# Patient Record
Sex: MALE | Race: WHITE | Employment: FULL TIME | URBAN - METROPOLITAN AREA
[De-identification: names, ages, dates, MRNs, and addresses within clinical notes are randomized per-mention and may not be internally consistent; named-entity substitution may affect disease eponyms.]

---

## 2017-01-18 ENCOUNTER — OFFICE VISIT (OUTPATIENT)
Dept: ORTHOPEDIC SURGERY | Age: 37
End: 2017-01-18

## 2017-01-18 VITALS — BODY MASS INDEX: 23.49 KG/M2 | WEIGHT: 155 LBS | HEIGHT: 68 IN

## 2017-01-18 DIAGNOSIS — M79.645 FINGER PAIN, LEFT: Primary | ICD-10-CM

## 2017-01-18 DIAGNOSIS — S60.022A CONTUSION OF LEFT INDEX FINGER WITHOUT DAMAGE TO NAIL, INITIAL ENCOUNTER: ICD-10-CM

## 2017-01-18 PROCEDURE — 73140 X-RAY EXAM OF FINGER(S): CPT | Performed by: ORTHOPAEDIC SURGERY

## 2017-01-18 PROCEDURE — 99213 OFFICE O/P EST LOW 20 MIN: CPT | Performed by: ORTHOPAEDIC SURGERY

## 2017-03-13 ENCOUNTER — OFFICE VISIT (OUTPATIENT)
Dept: FAMILY MEDICINE CLINIC | Age: 37
End: 2017-03-13

## 2017-03-13 VITALS
SYSTOLIC BLOOD PRESSURE: 106 MMHG | OXYGEN SATURATION: 98 % | BODY MASS INDEX: 22.5 KG/M2 | DIASTOLIC BLOOD PRESSURE: 70 MMHG | HEART RATE: 86 BPM | RESPIRATION RATE: 18 BRPM | WEIGHT: 148 LBS

## 2017-03-13 DIAGNOSIS — F31.78 BIPOLAR 1 DISORDER, MIXED, FULL REMISSION (HCC): ICD-10-CM

## 2017-03-13 DIAGNOSIS — F40.10 SOCIAL ANXIETY DISORDER: ICD-10-CM

## 2017-03-13 DIAGNOSIS — F51.04 PSYCHOPHYSIOLOGICAL INSOMNIA: ICD-10-CM

## 2017-03-13 DIAGNOSIS — I10 ESSENTIAL HYPERTENSION: Chronic | ICD-10-CM

## 2017-03-13 DIAGNOSIS — G40.209 PARTIAL SYMPTOMATIC EPILEPSY WITH COMPLEX PARTIAL SEIZURES, NOT INTRACTABLE, WITHOUT STATUS EPILEPTICUS (HCC): ICD-10-CM

## 2017-03-13 DIAGNOSIS — K21.9 GASTROESOPHAGEAL REFLUX DISEASE, ESOPHAGITIS PRESENCE NOT SPECIFIED: ICD-10-CM

## 2017-03-13 DIAGNOSIS — F32.5 MAJOR DEPRESSION IN COMPLETE REMISSION (HCC): ICD-10-CM

## 2017-03-13 PROCEDURE — 99214 OFFICE O/P EST MOD 30 MIN: CPT | Performed by: FAMILY MEDICINE

## 2017-03-13 RX ORDER — GABAPENTIN 400 MG/1
CAPSULE ORAL
Qty: 120 CAPSULE | Refills: 2 | Status: SHIPPED | OUTPATIENT
Start: 2017-03-13 | End: 2017-06-08 | Stop reason: SDUPTHER

## 2017-03-13 RX ORDER — CLONAZEPAM 2 MG/1
2 TABLET ORAL 2 TIMES DAILY PRN
Qty: 60 TABLET | Refills: 0 | Status: SHIPPED | OUTPATIENT
Start: 2017-03-13 | End: 2018-07-23 | Stop reason: SDUPTHER

## 2017-03-13 RX ORDER — LAMOTRIGINE 200 MG/1
TABLET ORAL
Qty: 60 TABLET | Refills: 2 | Status: SHIPPED | OUTPATIENT
Start: 2017-03-13 | End: 2017-06-08 | Stop reason: SDUPTHER

## 2017-03-13 RX ORDER — TRAZODONE HYDROCHLORIDE 100 MG/1
50-100 TABLET ORAL NIGHTLY
Qty: 40 TABLET | Refills: 5 | Status: SHIPPED | OUTPATIENT
Start: 2017-03-13 | End: 2018-01-12 | Stop reason: SDUPTHER

## 2017-03-13 RX ORDER — RANITIDINE 300 MG/1
TABLET ORAL
Qty: 30 TABLET | Refills: 5 | Status: SHIPPED | OUTPATIENT
Start: 2017-03-13 | End: 2017-06-08 | Stop reason: SDUPTHER

## 2017-03-13 RX ORDER — AMLODIPINE BESYLATE 2.5 MG/1
TABLET ORAL
Qty: 30 TABLET | Refills: 2 | Status: SHIPPED | OUTPATIENT
Start: 2017-03-13 | End: 2017-06-08 | Stop reason: SDUPTHER

## 2017-03-13 RX ORDER — TRIHEXYPHENIDYL HYDROCHLORIDE 2 MG/1
2 TABLET ORAL 2 TIMES DAILY
Qty: 60 TABLET | Refills: 11 | Status: SHIPPED | OUTPATIENT
Start: 2017-03-13 | End: 2018-01-12 | Stop reason: SDUPTHER

## 2017-03-13 RX ORDER — ZIPRASIDONE HYDROCHLORIDE 60 MG/1
60 CAPSULE ORAL 2 TIMES DAILY WITH MEALS
Qty: 60 CAPSULE | Refills: 5 | Status: SHIPPED | OUTPATIENT
Start: 2017-03-13 | End: 2017-06-08 | Stop reason: SDUPTHER

## 2017-03-13 RX ORDER — NADOLOL 20 MG/1
TABLET ORAL
Qty: 45 TABLET | Refills: 5 | Status: SHIPPED | OUTPATIENT
Start: 2017-03-13 | End: 2017-06-08 | Stop reason: SDUPTHER

## 2017-03-13 RX ORDER — ZIPRASIDONE HYDROCHLORIDE 40 MG/1
40 CAPSULE ORAL DAILY
Qty: 30 CAPSULE | Refills: 2 | Status: SHIPPED | OUTPATIENT
Start: 2017-03-13 | End: 2017-06-08 | Stop reason: SDUPTHER

## 2017-03-13 RX ORDER — CLONAZEPAM 1 MG/1
1 TABLET ORAL 2 TIMES DAILY
Qty: 60 TABLET | Refills: 2 | Status: SHIPPED | OUTPATIENT
Start: 2017-03-13 | End: 2017-06-08 | Stop reason: SDUPTHER

## 2017-03-14 ENCOUNTER — TELEPHONE (OUTPATIENT)
Dept: FAMILY MEDICINE CLINIC | Age: 37
End: 2017-03-14

## 2017-05-15 ENCOUNTER — OFFICE VISIT (OUTPATIENT)
Dept: ORTHOPEDIC SURGERY | Age: 37
End: 2017-05-15

## 2017-05-15 VITALS — WEIGHT: 150 LBS | HEIGHT: 70 IN | BODY MASS INDEX: 21.47 KG/M2

## 2017-05-15 DIAGNOSIS — M25.531 WRIST PAIN, CHRONIC, RIGHT: Primary | ICD-10-CM

## 2017-05-15 DIAGNOSIS — M65.4 DE QUERVAIN'S TENOSYNOVITIS: ICD-10-CM

## 2017-05-15 DIAGNOSIS — G89.29 WRIST PAIN, CHRONIC, RIGHT: Primary | ICD-10-CM

## 2017-05-15 PROCEDURE — 20550 NJX 1 TENDON SHEATH/LIGAMENT: CPT | Performed by: ORTHOPAEDIC SURGERY

## 2017-05-15 PROCEDURE — 73110 X-RAY EXAM OF WRIST: CPT | Performed by: ORTHOPAEDIC SURGERY

## 2017-05-15 PROCEDURE — 99213 OFFICE O/P EST LOW 20 MIN: CPT | Performed by: ORTHOPAEDIC SURGERY

## 2017-06-05 ENCOUNTER — OFFICE VISIT (OUTPATIENT)
Dept: ORTHOPEDIC SURGERY | Age: 37
End: 2017-06-05

## 2017-06-05 VITALS — WEIGHT: 149.91 LBS | BODY MASS INDEX: 21.46 KG/M2 | HEIGHT: 70 IN

## 2017-06-05 DIAGNOSIS — M79.641 HAND PAIN, RIGHT: ICD-10-CM

## 2017-06-05 DIAGNOSIS — M19.049 HAND ARTHRITIS: ICD-10-CM

## 2017-06-05 DIAGNOSIS — M65.4 DE QUERVAIN'S TENOSYNOVITIS: Primary | ICD-10-CM

## 2017-06-05 PROCEDURE — 20550 NJX 1 TENDON SHEATH/LIGAMENT: CPT | Performed by: ORTHOPAEDIC SURGERY

## 2017-06-05 PROCEDURE — 20600 DRAIN/INJ JOINT/BURSA W/O US: CPT | Performed by: ORTHOPAEDIC SURGERY

## 2017-06-05 PROCEDURE — 99213 OFFICE O/P EST LOW 20 MIN: CPT | Performed by: ORTHOPAEDIC SURGERY

## 2017-06-08 ENCOUNTER — OFFICE VISIT (OUTPATIENT)
Dept: FAMILY MEDICINE CLINIC | Age: 37
End: 2017-06-08

## 2017-06-08 VITALS
HEART RATE: 70 BPM | SYSTOLIC BLOOD PRESSURE: 118 MMHG | DIASTOLIC BLOOD PRESSURE: 70 MMHG | OXYGEN SATURATION: 98 % | HEIGHT: 70 IN | WEIGHT: 149 LBS | BODY MASS INDEX: 21.33 KG/M2 | RESPIRATION RATE: 16 BRPM

## 2017-06-08 DIAGNOSIS — F31.78 BIPOLAR 1 DISORDER, MIXED, FULL REMISSION (HCC): ICD-10-CM

## 2017-06-08 DIAGNOSIS — K21.9 GASTROESOPHAGEAL REFLUX DISEASE, ESOPHAGITIS PRESENCE NOT SPECIFIED: ICD-10-CM

## 2017-06-08 DIAGNOSIS — R20.2 PARESTHESIAS: ICD-10-CM

## 2017-06-08 DIAGNOSIS — Z11.4 SCREENING FOR HIV WITHOUT PRESENCE OF RISK FACTORS: ICD-10-CM

## 2017-06-08 DIAGNOSIS — N52.2 DRUG-INDUCED ERECTILE DYSFUNCTION: ICD-10-CM

## 2017-06-08 DIAGNOSIS — F40.10 SOCIAL ANXIETY DISORDER: ICD-10-CM

## 2017-06-08 DIAGNOSIS — M19.041 PRIMARY OSTEOARTHRITIS OF BOTH HANDS: ICD-10-CM

## 2017-06-08 DIAGNOSIS — I10 ESSENTIAL HYPERTENSION: Primary | Chronic | ICD-10-CM

## 2017-06-08 DIAGNOSIS — M19.042 PRIMARY OSTEOARTHRITIS OF BOTH HANDS: ICD-10-CM

## 2017-06-08 DIAGNOSIS — G40.209 PARTIAL SYMPTOMATIC EPILEPSY WITH COMPLEX PARTIAL SEIZURES, NOT INTRACTABLE, WITHOUT STATUS EPILEPTICUS (HCC): ICD-10-CM

## 2017-06-08 DIAGNOSIS — F32.5 MAJOR DEPRESSION IN COMPLETE REMISSION (HCC): ICD-10-CM

## 2017-06-08 PROCEDURE — 99214 OFFICE O/P EST MOD 30 MIN: CPT | Performed by: FAMILY MEDICINE

## 2017-06-08 RX ORDER — ZIPRASIDONE HYDROCHLORIDE 60 MG/1
60 CAPSULE ORAL 2 TIMES DAILY WITH MEALS
Qty: 60 CAPSULE | Refills: 5 | Status: SHIPPED | OUTPATIENT
Start: 2017-06-08 | End: 2017-09-18 | Stop reason: SDUPTHER

## 2017-06-08 RX ORDER — ZIPRASIDONE HYDROCHLORIDE 40 MG/1
40 CAPSULE ORAL DAILY
Qty: 30 CAPSULE | Refills: 5 | Status: SHIPPED | OUTPATIENT
Start: 2017-06-08 | End: 2017-09-18 | Stop reason: SDUPTHER

## 2017-06-08 RX ORDER — CLONAZEPAM 1 MG/1
1 TABLET ORAL 2 TIMES DAILY
Qty: 60 TABLET | Refills: 2 | Status: SHIPPED | OUTPATIENT
Start: 2017-06-08 | End: 2017-09-18 | Stop reason: SDUPTHER

## 2017-06-08 RX ORDER — SILDENAFIL 100 MG/1
100 TABLET, FILM COATED ORAL PRN
Qty: 6 TABLET | Refills: 11 | Status: SHIPPED | OUTPATIENT
Start: 2017-06-08 | End: 2018-03-15 | Stop reason: ALTCHOICE

## 2017-06-08 RX ORDER — RANITIDINE 300 MG/1
TABLET ORAL
Qty: 30 TABLET | Refills: 5 | Status: SHIPPED | OUTPATIENT
Start: 2017-06-08 | End: 2017-09-18 | Stop reason: SDUPTHER

## 2017-06-08 RX ORDER — NADOLOL 20 MG/1
TABLET ORAL
Qty: 45 TABLET | Refills: 1 | Status: SHIPPED | OUTPATIENT
Start: 2017-06-08 | End: 2018-01-12 | Stop reason: SDUPTHER

## 2017-06-08 RX ORDER — GABAPENTIN 400 MG/1
CAPSULE ORAL
Qty: 120 CAPSULE | Refills: 2 | Status: SHIPPED | OUTPATIENT
Start: 2017-06-08 | End: 2017-09-18 | Stop reason: SDUPTHER

## 2017-06-08 RX ORDER — LAMOTRIGINE 200 MG/1
TABLET ORAL
Qty: 60 TABLET | Refills: 2 | Status: SHIPPED | OUTPATIENT
Start: 2017-06-08 | End: 2017-09-18 | Stop reason: SDUPTHER

## 2017-06-08 RX ORDER — AMLODIPINE BESYLATE 2.5 MG/1
TABLET ORAL
Qty: 30 TABLET | Refills: 2 | Status: SHIPPED | OUTPATIENT
Start: 2017-06-08 | End: 2017-09-18 | Stop reason: SDUPTHER

## 2017-06-08 ASSESSMENT — ENCOUNTER SYMPTOMS
CHOKING: 0
WHEEZING: 0
SHORTNESS OF BREATH: 0
CHEST TIGHTNESS: 0
COUGH: 0

## 2017-06-08 ASSESSMENT — PATIENT HEALTH QUESTIONNAIRE - PHQ9
SUM OF ALL RESPONSES TO PHQ9 QUESTIONS 1 & 2: 0
SUM OF ALL RESPONSES TO PHQ9 QUESTIONS 1 & 2: 0
SUM OF ALL RESPONSES TO PHQ QUESTIONS 1-9: 0
2. FEELING DOWN, DEPRESSED OR HOPELESS: 0
1. LITTLE INTEREST OR PLEASURE IN DOING THINGS: 0
SUM OF ALL RESPONSES TO PHQ QUESTIONS 1-9: 0
1. LITTLE INTEREST OR PLEASURE IN DOING THINGS: 0
2. FEELING DOWN, DEPRESSED OR HOPELESS: 0

## 2017-06-10 LAB — HIV-1 AND HIV-2 ANTIBODIES: NEGATIVE

## 2017-06-13 PROBLEM — R20.2 PARESTHESIAS: Status: ACTIVE | Noted: 2017-06-13

## 2017-06-13 PROBLEM — R20.2 PARESTHESIAS: Chronic | Status: ACTIVE | Noted: 2017-06-08

## 2017-09-18 ENCOUNTER — TELEPHONE (OUTPATIENT)
Dept: FAMILY MEDICINE CLINIC | Age: 37
End: 2017-09-18

## 2017-09-18 ENCOUNTER — OFFICE VISIT (OUTPATIENT)
Dept: FAMILY MEDICINE CLINIC | Age: 37
End: 2017-09-18

## 2017-09-18 VITALS
SYSTOLIC BLOOD PRESSURE: 118 MMHG | WEIGHT: 160 LBS | RESPIRATION RATE: 16 BRPM | HEART RATE: 110 BPM | HEIGHT: 70 IN | OXYGEN SATURATION: 98 % | BODY MASS INDEX: 22.9 KG/M2 | DIASTOLIC BLOOD PRESSURE: 70 MMHG

## 2017-09-18 DIAGNOSIS — F40.10 SOCIAL ANXIETY DISORDER: ICD-10-CM

## 2017-09-18 DIAGNOSIS — I10 ESSENTIAL HYPERTENSION: Primary | Chronic | ICD-10-CM

## 2017-09-18 DIAGNOSIS — F51.04 PSYCHOPHYSIOLOGICAL INSOMNIA: ICD-10-CM

## 2017-09-18 DIAGNOSIS — K21.9 GASTROESOPHAGEAL REFLUX DISEASE, ESOPHAGITIS PRESENCE NOT SPECIFIED: ICD-10-CM

## 2017-09-18 DIAGNOSIS — F31.78 BIPOLAR 1 DISORDER, MIXED, FULL REMISSION (HCC): ICD-10-CM

## 2017-09-18 DIAGNOSIS — Z79.899 LONG-TERM USE OF HIGH-RISK MEDICATION: ICD-10-CM

## 2017-09-18 DIAGNOSIS — F32.5 MAJOR DEPRESSION IN COMPLETE REMISSION (HCC): ICD-10-CM

## 2017-09-18 DIAGNOSIS — G40.209 PARTIAL SYMPTOMATIC EPILEPSY WITH COMPLEX PARTIAL SEIZURES, NOT INTRACTABLE, WITHOUT STATUS EPILEPTICUS (HCC): ICD-10-CM

## 2017-09-18 LAB
AMPHETAMINE SCREEN, URINE: ABNORMAL
AMPHETAMINE SCREEN, URINE: NORMAL
BARBITURATE SCREEN URINE: ABNORMAL
BARBITURATE SCREEN, URINE: NORMAL
BENZODIAZEPINE SCREEN, URINE: NORMAL
BENZODIAZEPINE SCREEN, URINE: POSITIVE
CANNABINOID SCREEN URINE: ABNORMAL
COCAINE METABOLITE SCREEN URINE: ABNORMAL
COCAINE METABOLITE SCREEN URINE: NORMAL
Lab: ABNORMAL
MDMA URINE: NORMAL
METHADONE SCREEN, URINE: ABNORMAL
METHADONE SCREEN, URINE: NORMAL
METHAMPHETAMINE, URINE: NORMAL
OPIATE SCREEN URINE: ABNORMAL
OPIATE SCREEN URINE: NORMAL
OXYCODONE SCREEN URINE: NORMAL
OXYCODONE URINE: ABNORMAL
PH UA: 5
PHENCYCLIDINE SCREEN URINE: ABNORMAL
PHENCYCLIDINE SCREEN URINE: NORMAL
PROPOXYPHENE SCREEN, URINE: NORMAL
PROPOXYPHENE SCREEN: ABNORMAL
THC: NORMAL
TRICYCLIC ANTIDEPRESSANTS, UR: NORMAL

## 2017-09-18 PROCEDURE — 99214 OFFICE O/P EST MOD 30 MIN: CPT | Performed by: FAMILY MEDICINE

## 2017-09-18 PROCEDURE — 80305 DRUG TEST PRSMV DIR OPT OBS: CPT | Performed by: FAMILY MEDICINE

## 2017-09-18 RX ORDER — RANITIDINE 300 MG/1
TABLET ORAL
Qty: 30 TABLET | Refills: 5 | Status: SHIPPED | OUTPATIENT
Start: 2017-09-18 | End: 2018-07-23 | Stop reason: SDUPTHER

## 2017-09-18 RX ORDER — CLONAZEPAM 2 MG/1
2 TABLET ORAL 2 TIMES DAILY PRN
Qty: 60 TABLET | Refills: 0 | Status: CANCELLED | OUTPATIENT
Start: 2017-09-18

## 2017-09-18 RX ORDER — AMLODIPINE BESYLATE 2.5 MG/1
TABLET ORAL
Qty: 30 TABLET | Refills: 2 | Status: SHIPPED | OUTPATIENT
Start: 2017-09-18 | End: 2017-12-27 | Stop reason: SDUPTHER

## 2017-09-18 RX ORDER — CLONAZEPAM 1 MG/1
1 TABLET ORAL 2 TIMES DAILY
Qty: 60 TABLET | Refills: 2 | Status: SHIPPED | OUTPATIENT
Start: 2017-09-18 | End: 2018-01-12 | Stop reason: SDUPTHER

## 2017-09-18 RX ORDER — ZIPRASIDONE HYDROCHLORIDE 40 MG/1
40 CAPSULE ORAL DAILY
Qty: 30 CAPSULE | Refills: 5 | Status: SHIPPED | OUTPATIENT
Start: 2017-09-18 | End: 2018-04-12 | Stop reason: SDUPTHER

## 2017-09-18 RX ORDER — GABAPENTIN 400 MG/1
CAPSULE ORAL
Qty: 120 CAPSULE | Refills: 2 | Status: SHIPPED | OUTPATIENT
Start: 2017-09-18 | End: 2017-12-27 | Stop reason: SDUPTHER

## 2017-09-18 RX ORDER — TRAZODONE HYDROCHLORIDE 100 MG/1
50-100 TABLET ORAL NIGHTLY
Qty: 40 TABLET | Refills: 5 | Status: CANCELLED | OUTPATIENT
Start: 2017-09-18

## 2017-09-18 RX ORDER — NADOLOL 20 MG/1
TABLET ORAL
Qty: 45 TABLET | Refills: 1 | Status: CANCELLED | OUTPATIENT
Start: 2017-09-18

## 2017-09-18 RX ORDER — LAMOTRIGINE 200 MG/1
TABLET ORAL
Qty: 60 TABLET | Refills: 2 | Status: SHIPPED | OUTPATIENT
Start: 2017-09-18 | End: 2017-12-21 | Stop reason: SDUPTHER

## 2017-09-18 RX ORDER — TRIHEXYPHENIDYL HYDROCHLORIDE 2 MG/1
2 TABLET ORAL 2 TIMES DAILY
Qty: 60 TABLET | Refills: 11 | Status: CANCELLED | OUTPATIENT
Start: 2017-09-18

## 2017-09-18 RX ORDER — ZIPRASIDONE HYDROCHLORIDE 60 MG/1
60 CAPSULE ORAL 2 TIMES DAILY WITH MEALS
Qty: 60 CAPSULE | Refills: 5 | Status: SHIPPED | OUTPATIENT
Start: 2017-09-18 | End: 2018-04-12 | Stop reason: SDUPTHER

## 2017-09-18 ASSESSMENT — ENCOUNTER SYMPTOMS
NAUSEA: 0
ABDOMINAL PAIN: 0
DIARRHEA: 0
ABDOMINAL DISTENTION: 0
VOMITING: 0
CONSTIPATION: 0
BLOOD IN STOOL: 0

## 2017-10-27 ENCOUNTER — TELEPHONE (OUTPATIENT)
Dept: FAMILY MEDICINE CLINIC | Age: 37
End: 2017-10-27

## 2017-10-27 DIAGNOSIS — M54.5 ACUTE BILATERAL LOW BACK PAIN, WITH SCIATICA PRESENCE UNSPECIFIED: Primary | ICD-10-CM

## 2017-10-27 NOTE — TELEPHONE ENCOUNTER
Patient mom is calling because she needs a referral for [address] - does not have doctor's name, for his lower back. Phone: [phone]  Fax: [phone]    Needs a referral before they can schedule a appointment. Please give her a call back.

## 2017-12-21 DIAGNOSIS — F31.78 BIPOLAR 1 DISORDER, MIXED, FULL REMISSION (HCC): ICD-10-CM

## 2017-12-21 RX ORDER — LAMOTRIGINE 200 MG/1
TABLET ORAL
Qty: 60 TABLET | Refills: 0 | Status: SHIPPED | OUTPATIENT
Start: 2017-12-21 | End: 2018-01-12 | Stop reason: SDUPTHER

## 2017-12-27 DIAGNOSIS — I10 ESSENTIAL HYPERTENSION: Chronic | ICD-10-CM

## 2017-12-27 DIAGNOSIS — G40.209 PARTIAL SYMPTOMATIC EPILEPSY WITH COMPLEX PARTIAL SEIZURES, NOT INTRACTABLE, WITHOUT STATUS EPILEPTICUS (HCC): ICD-10-CM

## 2017-12-27 RX ORDER — AMLODIPINE BESYLATE 2.5 MG/1
TABLET ORAL
Qty: 30 TABLET | Refills: 0 | Status: SHIPPED | OUTPATIENT
Start: 2017-12-27 | End: 2018-01-12 | Stop reason: SDUPTHER

## 2017-12-27 RX ORDER — GABAPENTIN 400 MG/1
CAPSULE ORAL
Qty: 120 CAPSULE | Refills: 0 | Status: SHIPPED | OUTPATIENT
Start: 2017-12-27 | End: 2018-01-12 | Stop reason: SDUPTHER

## 2018-01-12 ENCOUNTER — OFFICE VISIT (OUTPATIENT)
Dept: FAMILY MEDICINE CLINIC | Age: 38
End: 2018-01-12

## 2018-01-12 VITALS
WEIGHT: 163 LBS | BODY MASS INDEX: 23.34 KG/M2 | RESPIRATION RATE: 16 BRPM | HEIGHT: 70 IN | HEART RATE: 76 BPM | DIASTOLIC BLOOD PRESSURE: 66 MMHG | SYSTOLIC BLOOD PRESSURE: 112 MMHG | OXYGEN SATURATION: 98 %

## 2018-01-12 DIAGNOSIS — K21.9 GASTROESOPHAGEAL REFLUX DISEASE, ESOPHAGITIS PRESENCE NOT SPECIFIED: ICD-10-CM

## 2018-01-12 DIAGNOSIS — F32.5 MAJOR DEPRESSION IN COMPLETE REMISSION (HCC): ICD-10-CM

## 2018-01-12 DIAGNOSIS — F51.04 PSYCHOPHYSIOLOGICAL INSOMNIA: ICD-10-CM

## 2018-01-12 DIAGNOSIS — F40.10 SOCIAL ANXIETY DISORDER: ICD-10-CM

## 2018-01-12 DIAGNOSIS — K22.70 BARRETT'S ESOPHAGUS WITHOUT DYSPLASIA: ICD-10-CM

## 2018-01-12 DIAGNOSIS — E87.5 HYPERKALEMIA: ICD-10-CM

## 2018-01-12 DIAGNOSIS — I10 ESSENTIAL HYPERTENSION: Primary | Chronic | ICD-10-CM

## 2018-01-12 DIAGNOSIS — G40.209 PARTIAL SYMPTOMATIC EPILEPSY WITH COMPLEX PARTIAL SEIZURES, NOT INTRACTABLE, WITHOUT STATUS EPILEPTICUS (HCC): ICD-10-CM

## 2018-01-12 DIAGNOSIS — F31.78 BIPOLAR 1 DISORDER, MIXED, FULL REMISSION (HCC): ICD-10-CM

## 2018-01-12 LAB
ANION GAP SERPL CALCULATED.3IONS-SCNC: 12 MMOL/L (ref 3–16)
BUN BLDV-MCNC: 13 MG/DL (ref 7–20)
CALCIUM SERPL-MCNC: 9.5 MG/DL (ref 8.3–10.6)
CHLORIDE BLD-SCNC: 101 MMOL/L (ref 99–110)
CO2: 29 MMOL/L (ref 21–32)
CREAT SERPL-MCNC: 0.8 MG/DL (ref 0.9–1.3)
GFR AFRICAN AMERICAN: >60
GFR NON-AFRICAN AMERICAN: >60
GLUCOSE BLD-MCNC: 89 MG/DL (ref 70–99)
POTASSIUM SERPL-SCNC: 4.2 MMOL/L (ref 3.5–5.1)
SODIUM BLD-SCNC: 142 MMOL/L (ref 136–145)

## 2018-01-12 PROCEDURE — 36415 COLL VENOUS BLD VENIPUNCTURE: CPT | Performed by: FAMILY MEDICINE

## 2018-01-12 PROCEDURE — 99214 OFFICE O/P EST MOD 30 MIN: CPT | Performed by: FAMILY MEDICINE

## 2018-01-12 PROCEDURE — G8484 FLU IMMUNIZE NO ADMIN: HCPCS | Performed by: FAMILY MEDICINE

## 2018-01-12 PROCEDURE — G8427 DOCREV CUR MEDS BY ELIG CLIN: HCPCS | Performed by: FAMILY MEDICINE

## 2018-01-12 PROCEDURE — 1036F TOBACCO NON-USER: CPT | Performed by: FAMILY MEDICINE

## 2018-01-12 PROCEDURE — G8420 CALC BMI NORM PARAMETERS: HCPCS | Performed by: FAMILY MEDICINE

## 2018-01-12 RX ORDER — AMLODIPINE BESYLATE 2.5 MG/1
TABLET ORAL
Qty: 30 TABLET | Refills: 5 | Status: SHIPPED | OUTPATIENT
Start: 2018-01-12 | End: 2018-07-23 | Stop reason: SDUPTHER

## 2018-01-12 RX ORDER — CLONAZEPAM 1 MG/1
1 TABLET ORAL 2 TIMES DAILY
Qty: 60 TABLET | Refills: 2 | Status: SHIPPED | OUTPATIENT
Start: 2018-01-12 | End: 2018-04-12 | Stop reason: SDUPTHER

## 2018-01-12 RX ORDER — RANITIDINE 300 MG/1
TABLET ORAL
Qty: 30 TABLET | Refills: 5 | Status: CANCELLED | OUTPATIENT
Start: 2018-01-12

## 2018-01-12 RX ORDER — NADOLOL 20 MG/1
TABLET ORAL
Qty: 45 TABLET | Refills: 1 | Status: SHIPPED | OUTPATIENT
Start: 2018-01-12 | End: 2018-04-12 | Stop reason: SDUPTHER

## 2018-01-12 RX ORDER — ZIPRASIDONE HYDROCHLORIDE 40 MG/1
40 CAPSULE ORAL DAILY
Qty: 30 CAPSULE | Refills: 5 | Status: CANCELLED | OUTPATIENT
Start: 2018-01-12

## 2018-01-12 RX ORDER — TRAZODONE HYDROCHLORIDE 100 MG/1
50-100 TABLET ORAL NIGHTLY
Qty: 30 TABLET | Refills: 5 | Status: SHIPPED | OUTPATIENT
Start: 2018-01-12 | End: 2018-04-12 | Stop reason: SDUPTHER

## 2018-01-12 RX ORDER — ZIPRASIDONE HYDROCHLORIDE 60 MG/1
60 CAPSULE ORAL 2 TIMES DAILY WITH MEALS
Qty: 60 CAPSULE | Refills: 5 | Status: CANCELLED | OUTPATIENT
Start: 2018-01-12

## 2018-01-12 RX ORDER — GABAPENTIN 400 MG/1
CAPSULE ORAL
Qty: 120 CAPSULE | Refills: 5 | Status: SHIPPED | OUTPATIENT
Start: 2018-01-12 | End: 2018-07-23 | Stop reason: SDUPTHER

## 2018-01-12 RX ORDER — TRIHEXYPHENIDYL HYDROCHLORIDE 2 MG/1
2 TABLET ORAL 2 TIMES DAILY
Qty: 60 TABLET | Refills: 11 | Status: SHIPPED | OUTPATIENT
Start: 2018-01-12 | End: 2018-12-04 | Stop reason: DRUGHIGH

## 2018-01-12 RX ORDER — GUAIFENESIN 600 MG/1
600 TABLET, EXTENDED RELEASE ORAL DAILY
COMMUNITY

## 2018-01-12 RX ORDER — LAMOTRIGINE 200 MG/1
TABLET ORAL
Qty: 60 TABLET | Refills: 0 | Status: SHIPPED | OUTPATIENT
Start: 2018-01-12 | End: 2018-03-20 | Stop reason: SDUPTHER

## 2018-01-12 NOTE — PATIENT INSTRUCTIONS
Marsha Yañez was seen today for hypertension. Diagnoses and all orders for this visit:    Essential hypertension  -     amLODIPine (NORVASC) 2.5 MG tablet; TAKE 1 TABLET BY MOUTH DAILY FOR HIGH BLOOD PRESSURE  -     nadolol (CORGARD) 20 MG tablet; 1/2 tab a daily by mouth For high blood pressure.  -     Good control.  -     Continue meds and lifestyle control. Partial symptomatic epilepsy with complex partial seizures, not intractable, without status epilepticus (HCC)  -     gabapentin (NEURONTIN) 400 MG capsule; TAKE 4 CAPS BY MOUTH NIGHTLY FOR SLEEP.  -     Good control.  -     Continue meds and lifestyle control. Bipolar 1 disorder, mixed, full remission (HCC)  -     lamoTRIgine (LAMICTAL) 200 MG tablet; TAKE 2 TABLETS 1 TIME DAILY AT DINNER  -     trihexyphenidyl (ARTANE) 2 MG tablet; Take 1 tablet by mouth 2 times daily  clonazePAM (KLONOPIN) 1 MG tablet 60 tablet 2 1/12/2018 4/12/2018    Sig - Route: Take 1 tablet by mouth 2 times daily for 90 days. - Oral    Class: Print    -     Good control.  -     Continue meds and lifestyle control. Durham's esophagus without dysplasia  -     Amb External Referral To Gastroenterology    Gastroesophageal reflux disease, esophagitis presence not specified  -     Amb External Referral To Gastroenterology  -     Good control.  -     Continue meds and lifestyle control. Psychophysiological insomnia  -     traZODone (DESYREL) 100 MG tablet; Take 0.5-1 tablets by mouth nightly  -     Good control.  -     Continue meds and lifestyle control. Major depression in complete remission (HCC)  -     Good control.  -     Continue meds and lifestyle control. Hyperkalemia  -     Basic Metabolic Panel; Future    Patient Education        Durham's Esophagus: Care Instructions  Your Care Instructions    The esophagus is the tube that connects the throat to the stomach. Food and liquids go through this tube. In Durham's esophagus, the cells that line the tube change. mattress. · Do not wear tight clothing around your midsection. · If you are overweight, lose weight. Even losing 5 to 10 pounds can help. When should you call for help? Call your doctor now or seek immediate medical care if:  ? · You have new or worse belly pain. ? · You are vomiting. ? Watch closely for changes in your health, and be sure to contact your doctor if:  ? · You have any pain or difficulty swallowing. ? · You are losing weight. ? · You have new or worse symptoms, such as heartburn. ? · You do not get better as expected. Where can you learn more? Go to https://KLab.FanGo. org and sign in to your Hita account. Enter L146 in the Panviva box to learn more about \"Durham's Esophagus: Care Instructions. \"     If you do not have an account, please click on the \"Sign Up Now\" link. Current as of: May 12, 2017  Content Version: 11.5  © 0388-9894 Healthwise, Incorporated. Care instructions adapted under license by [facility] [place] ([facility]). If you have questions about a medical condition or this instruction, always ask your healthcare professional. [place] [address] any warranty or liability for your use of this information.

## 2018-02-28 ENCOUNTER — TELEPHONE (OUTPATIENT)
Dept: FAMILY MEDICINE CLINIC | Age: 38
End: 2018-02-28

## 2018-02-28 DIAGNOSIS — S69.92XA INJURY OF LEFT HAND, INITIAL ENCOUNTER: ICD-10-CM

## 2018-02-28 DIAGNOSIS — S69.92XA INJURY OF LEFT WRIST, INITIAL ENCOUNTER: Primary | ICD-10-CM

## 2018-03-07 ENCOUNTER — OFFICE VISIT (OUTPATIENT)
Dept: ORTHOPEDIC SURGERY | Age: 38
End: 2018-03-07

## 2018-03-07 VITALS
BODY MASS INDEX: 24.05 KG/M2 | WEIGHT: 168 LBS | HEART RATE: 67 BPM | DIASTOLIC BLOOD PRESSURE: 79 MMHG | HEIGHT: 70 IN | SYSTOLIC BLOOD PRESSURE: 114 MMHG

## 2018-03-07 DIAGNOSIS — M25.532 LEFT WRIST PAIN: Primary | ICD-10-CM

## 2018-03-07 PROCEDURE — G8420 CALC BMI NORM PARAMETERS: HCPCS | Performed by: ORTHOPAEDIC SURGERY

## 2018-03-07 PROCEDURE — 1036F TOBACCO NON-USER: CPT | Performed by: ORTHOPAEDIC SURGERY

## 2018-03-07 PROCEDURE — 99213 OFFICE O/P EST LOW 20 MIN: CPT | Performed by: ORTHOPAEDIC SURGERY

## 2018-03-07 PROCEDURE — G8427 DOCREV CUR MEDS BY ELIG CLIN: HCPCS | Performed by: ORTHOPAEDIC SURGERY

## 2018-03-07 PROCEDURE — G8484 FLU IMMUNIZE NO ADMIN: HCPCS | Performed by: ORTHOPAEDIC SURGERY

## 2018-03-07 NOTE — PROGRESS NOTES
Assessment: Traumatic neuritis of the median nerve in the left hand without evidence of fracture. Treatment Plan: Since this is improving tremendously we will do anything further at the current time. I think Sarah Duque really just wanted to be certain that he didn't have an underlying fracture due to the intensity of the pain when the injury 1st occurred    Return if symptoms worsen or fail to improve. History of Present Illness  Prerna Sheth is a 30 y.o. male. 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 About 5 days ago a horse kicked back and impacted the palmar surface of his left hand. He immediately felt numbness in the median nerve distribution and intense pain and bruising in the palm. This has definitely improved substantially over the past few days and is almost resolved. He wishes concern with the degree of pain and bruising that he had initially that he could have an underlying fracture and didn't want to ignore it. Review of Systems  Pertinent items are noted in HPI  Complete Review of Systems reviewed from patient history form dated 3/7/18 and available in the patients chart under the media tab. Vital Signs  Vitals:    03/07/18 0830   BP: 114/79   Pulse: 67   Weight: 168 lb (76.2 kg)   Height: 5' 10\" (1.778 m)     Body mass index is 24.11 kg/m². Physical Exam  Constitutional:  Patient is well-nourished and demonstrates normal hygiene. Mental Status:  Patient is alert and oriented to person, place and time. Skin:  Intact, no rashes or lesions. Hand Examination: He is minimally tender in the anatomic snuffbox but really completely nontender over the scaphoid tuberosity. He's nontender on pisiform triquetral rocking test very mildly tender over the hook of the hamate but resisted ring and small finger flexion test are both negative.     Neurologic exam does show positive Tinel's sign over the median nerve at the carpal tunnel but now his sensation is normal in the median nerve distribution. Vascular exam shows normal radial arterial pulses normal capillary refill. Additional Comments:     Additional Examinations:  X-Ray Findings: PA lateral oblique and carpal tunnel view x-rays of the left wrist show no evidence of hook of hamate fracture.   No radiographic evidence of scaphoid fracture   Additional Diagnostic Test Findings:    Office Procedures:    Orders Placed This Encounter   Procedures    XR WRIST LEFT (MIN 3 VIEWS)     [id number]     Order Specific Question:   Reason for exam:     Answer:   Wrist Pain

## 2018-03-15 ENCOUNTER — OFFICE VISIT (OUTPATIENT)
Dept: FAMILY MEDICINE CLINIC | Age: 38
End: 2018-03-15

## 2018-03-15 VITALS
BODY MASS INDEX: 22.86 KG/M2 | SYSTOLIC BLOOD PRESSURE: 128 MMHG | HEART RATE: 66 BPM | OXYGEN SATURATION: 98 % | TEMPERATURE: 98.1 F | DIASTOLIC BLOOD PRESSURE: 82 MMHG | RESPIRATION RATE: 18 BRPM | WEIGHT: 159.7 LBS | HEIGHT: 70 IN

## 2018-03-15 DIAGNOSIS — R04.2 HEMOPTYSIS: Primary | ICD-10-CM

## 2018-03-15 DIAGNOSIS — K21.9 GASTROESOPHAGEAL REFLUX DISEASE, ESOPHAGITIS PRESENCE NOT SPECIFIED: ICD-10-CM

## 2018-03-15 DIAGNOSIS — Z87.891 FORMER SMOKER: ICD-10-CM

## 2018-03-15 DIAGNOSIS — K22.70 BARRETT'S ESOPHAGUS WITHOUT DYSPLASIA: ICD-10-CM

## 2018-03-15 PROCEDURE — 99214 OFFICE O/P EST MOD 30 MIN: CPT | Performed by: FAMILY MEDICINE

## 2018-03-15 ASSESSMENT — ENCOUNTER SYMPTOMS
WHEEZING: 0
CONSTIPATION: 0
SORE THROAT: 0
TROUBLE SWALLOWING: 0
COUGH: 1
VOMITING: 0
BLOOD IN STOOL: 0
SHORTNESS OF BREATH: 0
ANAL BLEEDING: 0
ABDOMINAL PAIN: 0
DIARRHEA: 0
EYE REDNESS: 0

## 2018-03-15 NOTE — PATIENT INSTRUCTIONS
Patient Education        Durham's Esophagus: Care Instructions  Your Care Instructions    The esophagus is the tube that connects the throat to the stomach. Food and liquids go through this tube. In Durham's esophagus, the cells that line the tube change. This is usually because of gastroesophageal reflux disease (GERD). GERD causes acid from your stomach to back up into the esophagus. When you have Durham's esophagus, you are slightly more likely to get cancer of the esophagus. So regular testing is important to watch for signs of this cancer. You can treat GERD to control your symptoms and feel better. Follow-up care is a key part of your treatment and safety. Be sure to make and go to all appointments, and call your doctor if you are having problems. It's also a good idea to know your test results and keep a list of the medicines you take. How can you care for yourself at home? · Take your medicines exactly as prescribed. Call your doctor if you think you are having a problem with your medicine. · If you take over-the-counter medicine, such as antacids or acid reducers, follow all instructions on the label. If you use these medicines often, talk with your doctor. Be careful when you take over-the-counter antacid medicines. Many of these medicines have aspirin in them. Read the label to make sure that you are not taking more than the recommended dose. Too much aspirin can be harmful. · Do not smoke or chew tobacco. Smoking can make GERD worse. If you need help quitting, talk to your doctor about stop-smoking programs and medicines. These can increase your chances of quitting for good. · Chocolate, mint, and alcohol can make GERD worse. They relax the valve between the esophagus and the stomach. · Spicy foods, foods that have a lot of acid (like tomatoes and oranges), and coffee can make GERD symptoms worse in some people.  If your symptoms are worse after you eat a certain food, you may want to stop eating that food to see if your symptoms get better. · Eat smaller meals, and more often. After eating, wait 2 to 3 hours before you lie down. · Raise the head of your bed 6 to 8 inches by putting blocks under the frame or a foam wedge under the head of the mattress. · Do not wear tight clothing around your midsection. · If you are overweight, lose weight. Even losing 5 to 10 pounds can help. When should you call for help? Call your doctor now or seek immediate medical care if:  ? · You have new or worse belly pain. ? · You are vomiting. ? Watch closely for changes in your health, and be sure to contact your doctor if:  ? · You have any pain or difficulty swallowing. ? · You are losing weight. ? · You have new or worse symptoms, such as heartburn. ? · You do not get better as expected. Where can you learn more? Go to https://Optimal [email or web address] Technology. org and sign in to your Colorescience account. Enter L146 in the Liibook box to learn more about \"Durham's Esophagus: Care Instructions. \"     If you do not have an account, please click on the \"Sign Up Now\" link. Current as of: May 12, 2017  Content Version: 11.5  © 2667-7881 Healthwise, Incorporated. Care instructions adapted under license by [facility] ([facility]). If you have questions about a medical condition or this instruction, always ask your healthcare professional. [place] [address] any warranty or liability for your use of this information.

## 2018-03-15 NOTE — PROGRESS NOTES
2 MG tablet Take 1 tablet by mouth 2 times daily as needed (SEIZURES) TAKE TO ABORT SEIZURES 60 tablet 0    ibuprofen (ADVIL;MOTRIN) 800 MG tablet Take 1 tablet by mouth every 8 hours as needed for Pain 60 tablet 1    Melatonin 5 MG TABS tablet Take 1 tablet by mouth nightly      fexofenadine (ALLEGRA) 180 MG tablet Take 1 tablet by mouth daily 30 tablet 11    folic acid (FOLVITE) 994 MCG tablet Take 1 tablet by mouth daily 30 tablet 11    therapeutic multivitamin-minerals (THERAGRAN-M) tablet Take 1 tablet by mouth daily. No current facility-administered medications for this visit. Allergies   Allergen Reactions    Banana Hives and Itching    Ancef [Cefazolin Sodium] Rash    Iodine Nausea And Vomiting    Shellfish-Derived Products Nausea And Vomiting       Review of Systems   Constitutional: Negative for chills, fever and unexpected weight change. HENT: Negative for ear pain, nosebleeds, sore throat and trouble swallowing. Eyes: Negative for redness and visual disturbance. Respiratory: Positive for cough. Negative for shortness of breath and wheezing. Cardiovascular: Negative for chest pain, palpitations and leg swelling. Gastrointestinal: Negative for abdominal pain, anal bleeding, blood in stool, constipation, diarrhea and vomiting. Genitourinary: Negative for difficulty urinating, dysuria and hematuria. Neurological: Negative for seizures, weakness and headaches. Hematological: Does not bruise/bleed easily. Objective:  /82 (Site: Right Arm, Position: Sitting, Cuff Size: Medium Adult)   Pulse 66   Temp 98.1 °F (36.7 °C) (Oral)   Resp 18   Ht 5' 10\" (1.778 m) Comment: with shoes  Wt 159 lb 11.2 oz (72.4 kg) Comment: WITH SHOES  SpO2 98%   BMI 22.91 kg/m²     Physical Exam   Constitutional: He is oriented to person, place, and time. He appears well-developed and well-nourished. He is cooperative. HENT:   Head: Normocephalic and atraumatic.    Right Ear:

## 2018-03-20 DIAGNOSIS — F31.78 BIPOLAR 1 DISORDER, MIXED, FULL REMISSION (HCC): ICD-10-CM

## 2018-03-20 RX ORDER — LAMOTRIGINE 200 MG/1
TABLET ORAL
Qty: 60 TABLET | Refills: 0 | Status: SHIPPED | OUTPATIENT
Start: 2018-03-20 | End: 2018-04-12 | Stop reason: SDUPTHER

## 2018-03-23 ENCOUNTER — HOSPITAL ENCOUNTER (OUTPATIENT)
Dept: SURGERY | Age: 38
Discharge: HOME | End: 2018-03-23
Attending: INTERNAL MEDICINE | Admitting: INTERNAL MEDICINE

## 2018-03-23 VITALS
RESPIRATION RATE: 18 BRPM | DIASTOLIC BLOOD PRESSURE: 84 MMHG | HEIGHT: 70 IN | HEART RATE: 88 BPM | OXYGEN SATURATION: 98 % | SYSTOLIC BLOOD PRESSURE: 108 MMHG | BODY MASS INDEX: 22.9 KG/M2 | TEMPERATURE: 97.3 F | WEIGHT: 160 LBS

## 2018-03-23 DIAGNOSIS — K22.70 BARRETT'S ESOPHAGUS WITHOUT DYSPLASIA: ICD-10-CM

## 2018-03-23 RX ORDER — LIDOCAINE HYDROCHLORIDE 10 MG/ML
0.1 INJECTION, SOLUTION EPIDURAL; INFILTRATION; INTRACAUDAL; PERINEURAL
Status: ACTIVE | OUTPATIENT
Start: 2018-03-23 | End: 2018-03-23

## 2018-03-23 RX ORDER — SODIUM CHLORIDE, SODIUM LACTATE, POTASSIUM CHLORIDE, CALCIUM CHLORIDE 600; 310; 30; 20 MG/100ML; MG/100ML; MG/100ML; MG/100ML
INJECTION, SOLUTION INTRAVENOUS ONCE
Status: DISCONTINUED | OUTPATIENT
Start: 2018-03-23 | End: 2018-03-24 | Stop reason: HOSPADM

## 2018-03-23 RX ORDER — LIDOCAINE HYDROCHLORIDE 10 MG/ML
2 INJECTION, SOLUTION INFILTRATION; PERINEURAL
Status: ACTIVE | OUTPATIENT
Start: 2018-03-23 | End: 2018-03-23

## 2018-03-23 RX ORDER — SODIUM CHLORIDE, SODIUM LACTATE, POTASSIUM CHLORIDE, CALCIUM CHLORIDE 600; 310; 30; 20 MG/100ML; MG/100ML; MG/100ML; MG/100ML
INJECTION, SOLUTION INTRAVENOUS CONTINUOUS
Status: DISCONTINUED | OUTPATIENT
Start: 2018-03-23 | End: 2018-03-24 | Stop reason: HOSPADM

## 2018-03-23 RX ADMIN — SODIUM CHLORIDE, SODIUM LACTATE, POTASSIUM CHLORIDE, CALCIUM CHLORIDE: 600; 310; 30; 20 INJECTION, SOLUTION INTRAVENOUS at 06:48

## 2018-03-23 ASSESSMENT — PAIN SCALES - GENERAL
PAINLEVEL_OUTOF10: 0
PAINLEVEL_OUTOF10: 0

## 2018-03-23 ASSESSMENT — PAIN - FUNCTIONAL ASSESSMENT: PAIN_FUNCTIONAL_ASSESSMENT: 0-10

## 2018-03-23 ASSESSMENT — PAIN DESCRIPTION - DESCRIPTORS: DESCRIPTORS: ACHING

## 2018-03-23 NOTE — PROGRESS NOTES
Discharge instructions reviewed with patient/responsible adult and understanding verbalized. Discharge instructions signed and copies given. Patient discharged home with belongings.   Assist to car

## 2018-03-23 NOTE — ANESTHESIA PRE-OP
[facility]) tablet Take 1 tablet by mouth daily.  ziprasidone (GEODON) 60 MG capsule Take 1 capsule by mouth 2 times daily (with meals) Take a 60 mg with a 40 mg at dinner 60 capsule 5    clonazePAM (KLONOPIN) 2 MG tablet Take 1 tablet by mouth 2 times daily as needed (SEIZURES) TAKE TO ABORT SEIZURES 60 tablet 0     Current Facility-Administered Medications   Medication Dose Route Frequency Provider Last Rate Last Dose    lactated ringers infusion   Intravenous Continuous Capri Cheng  mL/hr at 03/23/18 0648      lidocaine 1 % injection 2 mL  2 mL Intradermal Once PRN Capri Cheng MD        lactated ringers infusion   Intravenous Once Magan Bills MD        lidocaine PF 1 % injection 0.1 mL  0.1 mL Intradermal Once PRN Magan Bills MD           Allergies:     Allergies   Allergen Reactions    Banana Hives and Itching    Ancef [Cefazolin Sodium] Rash    Iodine Nausea And Vomiting    Shellfish-Derived Products Nausea And Vomiting       Problem List:    Patient Active Problem List   Diagnosis Code    Hypertension I10    Seizure R56.9    Psychiatric diagnosis F99    Psychiatric problem F99    Palpitations R00.2    Partial complex seizure disorder without intractable epilepsy ([facility] [address] G40.209    Social anxiety disorder F40.10    Major depression in complete remission (HCC) F32.5    Bipolar 1 disorder, mixed, full remission (HCC) F31.78    GERD (gastroesophageal reflux disease) K21.9    Right wrist sprain S63.501A    De Quervain's tenosynovitis M65.4    Paresthesias of both legs posteriorly R20.2    Durham's esophagus K22.70    Former smoker Z87.891       Past Medical History:        Diagnosis Date    Abscess of elbow 2012    right    Alcoholism /alcohol abuse ([facility] [address] 1/1/2005    Stopped 2014    Arthritis     Durham's esophagus     Bipolar 1 disorder, mixed, full remission ([facility] 1/1/2006    Contusion of wrist, right 04/01/2016    Essential hypertension

## 2018-03-23 NOTE — OP NOTE
normal    Recommendations: Erosive gastropathy is likely related to the use of NSAIDs. I have asked him to stop Advil and to start daily Prilosec. I will contact him next week with biopsy results.     Mackenzie Bedolla MD

## 2018-03-25 ENCOUNTER — HOSPITAL ENCOUNTER (OUTPATIENT)
Dept: CT IMAGING | Age: 38
Discharge: HOME | End: 2018-03-25
Attending: FAMILY MEDICINE | Admitting: FAMILY MEDICINE

## 2018-03-25 DIAGNOSIS — R04.2 HEMOPTYSIS: ICD-10-CM

## 2018-03-25 DIAGNOSIS — Z87.891 PERSONAL HISTORY OF TOBACCO USE, PRESENTING HAZARDS TO HEALTH: ICD-10-CM

## 2018-03-25 DIAGNOSIS — Z87.891 FORMER SMOKER: ICD-10-CM

## 2018-04-12 ENCOUNTER — OFFICE VISIT (OUTPATIENT)
Dept: FAMILY MEDICINE CLINIC | Age: 38
End: 2018-04-12

## 2018-04-12 VITALS
HEART RATE: 74 BPM | BODY MASS INDEX: 23.77 KG/M2 | WEIGHT: 166 LBS | HEIGHT: 70 IN | RESPIRATION RATE: 16 BRPM | SYSTOLIC BLOOD PRESSURE: 122 MMHG | OXYGEN SATURATION: 98 % | DIASTOLIC BLOOD PRESSURE: 72 MMHG

## 2018-04-12 DIAGNOSIS — K31.89 EROSIVE GASTROPATHY: ICD-10-CM

## 2018-04-12 DIAGNOSIS — G40.209 PARTIAL SYMPTOMATIC EPILEPSY WITH COMPLEX PARTIAL SEIZURES, NOT INTRACTABLE, WITHOUT STATUS EPILEPTICUS (HCC): ICD-10-CM

## 2018-04-12 DIAGNOSIS — S22.31XD CLOSED FRACTURE OF ONE RIB OF RIGHT SIDE WITH ROUTINE HEALING: Chronic | ICD-10-CM

## 2018-04-12 DIAGNOSIS — F31.78 BIPOLAR 1 DISORDER, MIXED, FULL REMISSION (HCC): ICD-10-CM

## 2018-04-12 DIAGNOSIS — F40.10 SOCIAL ANXIETY DISORDER: ICD-10-CM

## 2018-04-12 DIAGNOSIS — I10 ESSENTIAL HYPERTENSION: Chronic | ICD-10-CM

## 2018-04-12 DIAGNOSIS — F32.5 MAJOR DEPRESSION IN COMPLETE REMISSION (HCC): ICD-10-CM

## 2018-04-12 DIAGNOSIS — F51.04 PSYCHOPHYSIOLOGICAL INSOMNIA: ICD-10-CM

## 2018-04-12 PROBLEM — Z87.891 FORMER SMOKER: Chronic | Status: ACTIVE | Noted: 2018-03-15

## 2018-04-12 PROCEDURE — 1036F TOBACCO NON-USER: CPT | Performed by: FAMILY MEDICINE

## 2018-04-12 PROCEDURE — 99214 OFFICE O/P EST MOD 30 MIN: CPT | Performed by: FAMILY MEDICINE

## 2018-04-12 PROCEDURE — G8420 CALC BMI NORM PARAMETERS: HCPCS | Performed by: FAMILY MEDICINE

## 2018-04-12 PROCEDURE — G8427 DOCREV CUR MEDS BY ELIG CLIN: HCPCS | Performed by: FAMILY MEDICINE

## 2018-04-12 RX ORDER — ZIPRASIDONE HYDROCHLORIDE 40 MG/1
40 CAPSULE ORAL DAILY
Qty: 30 CAPSULE | Refills: 5 | Status: SHIPPED | OUTPATIENT
Start: 2018-04-12 | End: 2018-10-22 | Stop reason: SDUPTHER

## 2018-04-12 RX ORDER — TRAZODONE HYDROCHLORIDE 100 MG/1
50-100 TABLET ORAL NIGHTLY
Qty: 30 TABLET | Refills: 5 | Status: SHIPPED | OUTPATIENT
Start: 2018-04-12 | End: 2018-10-22 | Stop reason: SDUPTHER

## 2018-04-12 RX ORDER — ZIPRASIDONE HYDROCHLORIDE 60 MG/1
60 CAPSULE ORAL 2 TIMES DAILY WITH MEALS
Qty: 60 CAPSULE | Refills: 5 | Status: SHIPPED | OUTPATIENT
Start: 2018-04-12 | End: 2018-10-22 | Stop reason: SDUPTHER

## 2018-04-12 RX ORDER — NADOLOL 20 MG/1
TABLET ORAL
Qty: 45 TABLET | Refills: 1 | Status: SHIPPED | OUTPATIENT
Start: 2018-04-12 | End: 2018-07-23 | Stop reason: SDUPTHER

## 2018-04-12 RX ORDER — CLONAZEPAM 1 MG/1
1 TABLET ORAL 2 TIMES DAILY
Qty: 60 TABLET | Refills: 2 | Status: SHIPPED | OUTPATIENT
Start: 2018-04-12 | End: 2018-07-23 | Stop reason: SDUPTHER

## 2018-04-12 RX ORDER — LAMOTRIGINE 200 MG/1
TABLET ORAL
Qty: 60 TABLET | Refills: 5 | Status: SHIPPED | OUTPATIENT
Start: 2018-04-12 | End: 2018-07-23 | Stop reason: SDUPTHER

## 2018-04-12 ASSESSMENT — ENCOUNTER SYMPTOMS
CHEST TIGHTNESS: 0
SHORTNESS OF BREATH: 0
COUGH: 1
WHEEZING: 0
CHOKING: 0

## 2018-04-15 PROBLEM — K31.89 EROSIVE GASTROPATHY: Chronic | Status: ACTIVE | Noted: 2018-03-23

## 2018-07-23 ENCOUNTER — OFFICE VISIT (OUTPATIENT)
Dept: FAMILY MEDICINE CLINIC | Age: 38
End: 2018-07-23

## 2018-07-23 VITALS
RESPIRATION RATE: 22 BRPM | OXYGEN SATURATION: 97 % | BODY MASS INDEX: 24.91 KG/M2 | HEIGHT: 70 IN | HEART RATE: 79 BPM | SYSTOLIC BLOOD PRESSURE: 102 MMHG | DIASTOLIC BLOOD PRESSURE: 60 MMHG | WEIGHT: 174 LBS

## 2018-07-23 DIAGNOSIS — F51.04 PSYCHOPHYSIOLOGICAL INSOMNIA: ICD-10-CM

## 2018-07-23 DIAGNOSIS — G40.209 PARTIAL SYMPTOMATIC EPILEPSY WITH COMPLEX PARTIAL SEIZURES, NOT INTRACTABLE, WITHOUT STATUS EPILEPTICUS (HCC): ICD-10-CM

## 2018-07-23 DIAGNOSIS — K21.9 GASTROESOPHAGEAL REFLUX DISEASE, ESOPHAGITIS PRESENCE NOT SPECIFIED: ICD-10-CM

## 2018-07-23 DIAGNOSIS — G89.29 CHRONIC LEFT SHOULDER PAIN: ICD-10-CM

## 2018-07-23 DIAGNOSIS — F31.78 BIPOLAR 1 DISORDER, MIXED, FULL REMISSION (HCC): ICD-10-CM

## 2018-07-23 DIAGNOSIS — I10 ESSENTIAL HYPERTENSION: Primary | Chronic | ICD-10-CM

## 2018-07-23 DIAGNOSIS — F40.10 SOCIAL ANXIETY DISORDER: ICD-10-CM

## 2018-07-23 DIAGNOSIS — M25.512 CHRONIC LEFT SHOULDER PAIN: ICD-10-CM

## 2018-07-23 PROCEDURE — 1036F TOBACCO NON-USER: CPT | Performed by: FAMILY MEDICINE

## 2018-07-23 PROCEDURE — G8420 CALC BMI NORM PARAMETERS: HCPCS | Performed by: FAMILY MEDICINE

## 2018-07-23 PROCEDURE — 99215 OFFICE O/P EST HI 40 MIN: CPT | Performed by: FAMILY MEDICINE

## 2018-07-23 PROCEDURE — G8427 DOCREV CUR MEDS BY ELIG CLIN: HCPCS | Performed by: FAMILY MEDICINE

## 2018-07-23 RX ORDER — GABAPENTIN 400 MG/1
CAPSULE ORAL
Qty: 120 CAPSULE | Refills: 5 | Status: SHIPPED | OUTPATIENT
Start: 2018-07-23 | End: 2018-08-21 | Stop reason: SDUPTHER

## 2018-07-23 RX ORDER — AMLODIPINE BESYLATE 2.5 MG/1
TABLET ORAL
Qty: 30 TABLET | Refills: 5 | Status: SHIPPED | OUTPATIENT
Start: 2018-07-23 | End: 2019-01-21 | Stop reason: SDUPTHER

## 2018-07-23 RX ORDER — LAMOTRIGINE 200 MG/1
TABLET ORAL
Qty: 60 TABLET | Refills: 5 | Status: SHIPPED | OUTPATIENT
Start: 2018-07-23 | End: 2019-01-21 | Stop reason: SDUPTHER

## 2018-07-23 RX ORDER — NADOLOL 20 MG/1
TABLET ORAL
Qty: 45 TABLET | Refills: 1 | Status: SHIPPED | OUTPATIENT
Start: 2018-07-23 | End: 2018-10-22 | Stop reason: SDUPTHER

## 2018-07-23 RX ORDER — RANITIDINE 300 MG/1
TABLET ORAL
Qty: 30 TABLET | Refills: 5 | Status: SHIPPED | OUTPATIENT
Start: 2018-07-23 | End: 2019-01-09 | Stop reason: SDUPTHER

## 2018-07-23 RX ORDER — CLONAZEPAM 2 MG/1
2 TABLET ORAL 2 TIMES DAILY PRN
Qty: 30 TABLET | Refills: 0 | Status: SHIPPED | OUTPATIENT
Start: 2018-07-23 | End: 2018-10-22 | Stop reason: SDUPTHER

## 2018-07-23 RX ORDER — CLONAZEPAM 1 MG/1
1 TABLET ORAL 2 TIMES DAILY
Qty: 60 TABLET | Refills: 2 | Status: SHIPPED | OUTPATIENT
Start: 2018-07-23 | End: 2018-10-22 | Stop reason: SDUPTHER

## 2018-07-23 ASSESSMENT — ENCOUNTER SYMPTOMS
CHOKING: 0
COUGH: 0
WHEEZING: 0
CHEST TIGHTNESS: 0
SHORTNESS OF BREATH: 0

## 2018-07-23 NOTE — PATIENT INSTRUCTIONS
slowly. Ease off the exercise if you start to have pain. Your doctor or physical therapist will tell you when you can start these exercises and which ones will work best for you. How to do the exercises  Shoulder roll    1. Stand tall with your chin slightly tucked. Imagine that a string at the top of your head is pulling you straight up. 2. Keep your arms relaxed. All motion will be in your shoulders. 3. Shrug your shoulders up toward your ears, then up and back. [place] your shoulders down and back, like you're sliding your hands down into your back pants pockets. 4. Repeat the circles at least 2 to 4 times. 5. This exercise is also helpful anytime you want to relax. Lower neck and upper back stretch    1. With your arms about shoulder height, clasp your hands in front of you. 2. Drop your chin toward your chest.  3. Reach straight forward so you are rounding your upper back. Think about pulling your shoulder blades apart. Christiano Doyle feel a stretch across your upper back and shoulders. Hold for at least 6 seconds. 4. Repeat 2 to 4 times. Triceps stretch    1. Reach your arm straight up. 2. Keeping your elbow in place, bend your arm and reach your hand down behind your back. 3. With your other hand, apply gentle pressure to the bent elbow. Christiano Doyle feel a stretch at the back of your upper arm and shoulder. Hold about 6 seconds. 4. Repeat 2 to 4 times with each arm. Shoulder stretch    1. Relax your shoulders. 2. Raise one arm to shoulder height, and reach it across your chest.  3. Pull the arm slightly toward you with your other arm. This will help you get a gentle stretch. Hold for about 6 seconds. 4. Repeat 2 to 4 times. Shoulder blade squeeze    1. Sit or stand up tall with your arms at your sides. 2. Keep your shoulders relaxed and down, not shrugged. 3. Squeeze your shoulder blades together. Hold for 6 seconds, then relax. 4. Repeat 8 to 12 times. Straight-arm shoulder blade squeeze    1.  Sit or able relax in this position for 2 or 3 minutes. When you can relax for at least 2 minutes, you only need to do the exercise 1 time per session. Chest goalpost stretch    1. Lie on your back. Raise your knees so they are bent. Plant your feet on the floor, hip-width apart. 2. Tuck your chin, and relax your shoulders. 3. Reach your arms straight out to the sides. 4. Bend your arms at the elbows, with your hands pointed toward the top of your head. Your arms should make an L on either side of your head. Your palms should be facing up. 5. If you don't feel a mild stretch in your shoulders and across your chest, use a foam roll or tightly rolled blanket under your spine, from your tailbone to your head. 6. Relax in this position for at least 15 to 30 seconds while you breathe normally. Repeat 2 to 4 times. 7. Each day you do this exercise, add a little more time until you can relax in this position for 2 or 3 minutes. When you can relax for at least 2 minutes, you only need to do the exercise 1 time per session. Follow-up care is a key part of your treatment and safety. Be sure to make and go to all appointments, and call your doctor if you are having problems. It's also a good idea to know your test results and keep a list of the medicines you take. Where can you learn more? Go to https://Hitlabpediegoewmatilde.TheJobPost. org and sign in to your [employer]el[employer] account. Enter [phone] in the [facility] box to learn more about \"Shoulder Blade: Exercises. \"     If you do not have an account, please click on the \"Sign Up Now\" link. Current as of: November 29, 2017  Content Version: 11.6  © 2889-5463 [employer], Incorporated. Care instructions adapted under license by [facility] [employer] [facility] ([facility]). If you have questions about a medical condition or this instruction, always ask your healthcare professional. [address] any warranty or liability for your use of this information.        Patient Education couch, then to a sturdy chair, and finally to the floor. Scapular exercise: Retraction    For this exercise, you will need elastic exercise material, such as surgical tubing or Thera-Band. 1. Put the band around a solid object at about waist level. (A bedpost will work well.) Each hand should hold an end of the band. 2. With your elbows at your sides and bent to 90 degrees, pull the band back. Your shoulder blades should move toward each other. Then move your arms back where you started. 3. Repeat 8 to 12 times. 4. If you have good range of motion in your shoulders, try this exercise with your arms lifted out to the sides. Keep your elbows at a 90-degree angle. Raise the elastic band up to about shoulder level. Pull the band back to move your shoulder blades toward each other. Then move your arms back where you started. Internal rotator strengthening exercise    1. Start by tying a piece of elastic exercise material to a doorknob. You can use surgical tubing or Thera-Band. 2. Stand or sit with your shoulder relaxed and your elbow bent 90 degrees. Your upper arm should rest comfortably against your side. Squeeze a rolled towel between your elbow and your body for comfort. This will help keep your arm at your side. 3. Hold one end of the elastic band in the hand of the painful arm. 4. Slowly rotate your forearm toward your body until it touches your belly. Slowly move it back to where you started. 5. Keep your elbow and upper arm firmly tucked against the towel roll or at your side. 6. Repeat 8 to 12 times. External rotator strengthening exercise    1. Start by tying a piece of elastic exercise material to a doorknob. You can use surgical tubing or Thera-Band. (You may also hold one end of the band in each hand.)  2. Stand or sit with your shoulder relaxed and your elbow bent 90 degrees. Your upper arm should rest comfortably against your side.  Squeeze a rolled towel between your elbow and your body for

## 2018-07-23 NOTE — LETTER
reduced coughing, which are especially dangerous for patients with lung disease. Overdose or dangerous interactions with alcohol and other medications may occur, leading to death. Hyperalgesia may develop, in which patients receiving opioids for the treatment of pain may actually become more sensitive to certain painful stimuli, and in some cases, experience pain from ordinarily non-painful stimuli. Women between the ages of 14-53 who could become pregnant should carefully weigh the risks and benefits of opioids with their physicians, as these medications increase the risk of pregnancy complications, including miscarriage,  delivery and stillbirth. It is also possible for babies to be born addicted to opioids. Opioid dependence withdrawal symptoms may include; feelings of uneasiness, increased pain, irritability, belly pain, diarrhea, sweats and goose-flesh. Benzodiazepines and non-benzodiazepine sleep medications: These medications can lead to problems such as addiction/dependence, sedation, fatigue, lightheadedness, dizziness, incoordination, falls, depression, hallucinations, and impaired judgment, memory and concentration. The ability to drive and operate machinery may also be affected. Abnormal sleep-related behaviors have been reported, including sleep walking, driving, making telephone calls, eating, or having sex while not fully awake. These medications can suppress breathing and worsen sleep apnea, particularly when combined with alcohol or other sedating medications, potentially leading to death. Dependence withdrawal symptoms may include tremors, anxiety, hallucinations and seizures. Stimulants:  Common adverse effects include addiction/dependence, increased blood pressure and heart rate, decreased appetite, nausea, involuntary weight loss, insomnia, irritability, and headaches.   These risks may increase when these medications are combined with other stimulants, such as caffeine pills or energy drinks, certain weight loss supplements and oral decongestants. Dependence withdrawal symptoms may include depressed mood, loss of interest, suicidal thoughts, anxiety, fatigue, appetite changes and agitation. Testosterone replacement therapy:  Potential side effects include increased risk of stroke and heart attack, blood clots, increased blood pressure, increased cholesterol, enlarged prostate, sleep apnea, irritability/aggression and other mood disorders, and decreased fertility. Other:     1. I understand that I have the following responsibilities:  · I will take medications at the dose and frequency prescribed. · I will not increase or change how I take my medications without the approval of the health care provider who signs this Medication Agreement. · I will arrange for refills at the prescribed interval ONLY during regular office hours. I will not ask for refills earlier than agreed, after-hours, on holidays or on weekends. · I will obtain all refills for these medications at  ·  ________________[facility]____  pharmacy (phone number  ·  _______[phone]_________________), with full consent for my provider and pharmacist to exchange information in writing or verbally. · I will not request any pain medications or controlled substances from other providers and will inform this provider of all other medications I am taking. · I will inform my other health care providers that I am taking these medications and of the existence of this [facility] [facility]. In the event of an emergency, I will provide the same information to the emergency department providers. · I will protect my prescriptions and medications. I understand that lost or misplaced prescriptions will not be replaced. · I will keep medications only for my own use and will not share them with others. I will keep all medications away from children. · My behavior is inconsistent with the responsibilities outlined above, which may also result in my being prevented from receiving further care from this office. · Other:____________________________________________________________________    AGREEMENT:    I have read the above and have had all of my questions answered. For chronic disease management, I know that my symptoms can be managed with many types of treatments. A chronic medication trial may be part of my treatment, but I must be an active participant in my care. Medication therapy is only one part of my symptom management plan. In some cases, there may be limited scientific evidence to support the chronic use of certain medications to improve symptoms and daily function. Furthermore, in certain circumstances, there may be scientific information that suggests that use of chronic controlled substances may actually worsen my symptoms and increase my risk of unintentional death directly related to this medication therapy. I know that if my provider feels my risk from controlled medications is greater than my benefit, I will have my controlled substance medication(s) compassionately lowered or removed altogether. I agree to a controlled substance medication trial.      I further agree to allow this office to contact family or friends if there are concerns about my safety and use of the controlled medications. I have agreed to use the following medications above as instructed by my physician and as stated in this [facility] [address].      Patient Signature:  ______________________  Date:7/23/2018 or _____________    Provider Signature:______________________  Date:7/23/2018 or _____________

## 2018-07-23 NOTE — PROGRESS NOTES
NIGHTLY  After EGD he changed by cutting out the [place] diet. Was having heartburn since the EGD. Put him on a new med and it  Didn't work. Then he got back on the med. Shoulder dislocation at 5 y/o  Patient had told us this in passing. He does have some pain from it. His parents never took him to the doctor because they feared they would be charged with child abuse. Current Outpatient Prescriptions on File Prior to Visit   Medication Sig Dispense Refill    clonazePAM (KLONOPIN) 1 MG tablet Take 1 tablet by mouth 2 times daily for 90 days. . 60 tablet 2    nadolol (CORGARD) 20 MG tablet 1/2 tab a daily by mouth For high blood pressure.  45 tablet 1    lamoTRIgine (LAMICTAL) 200 MG tablet TAKE 2 TABLETS 1 TIME DAILY AT DINNER 60 tablet 5    ziprasidone (GEODON) 40 MG capsule Take 1 capsule by mouth daily Take with 60 mg tab at dinner 30 capsule 5    ziprasidone (GEODON) 60 MG capsule Take 1 capsule by mouth 2 times daily (with meals) Take a 60 mg with a 40 mg at dinner 60 capsule 5    traZODone (DESYREL) 100 MG tablet Take 0.5-1 tablets by mouth nightly 30 tablet 5    GLUCOSAMINE-CHONDROITIN ER PO Take by mouth 2 times daily      amLODIPine (NORVASC) 2.5 MG tablet TAKE 1 TABLET BY MOUTH DAILY FOR HIGH BLOOD PRESSURE 30 tablet 5    gabapentin (NEURONTIN) 400 MG capsule TAKE 4 CAPS BY MOUTH NIGHTLY FOR SLEEP. 120 capsule 5    trihexyphenidyl (ARTANE) 2 MG tablet Take 1 tablet by mouth 2 times daily 60 tablet 11    guaiFENesin (MUCINEX) 600 MG extended release tablet Take 600 mg by mouth daily       ranitidine (ZANTAC) 300 MG tablet TAKE ONE TABLET BY MOUTH NIGHTLY 30 tablet 5    clonazePAM (KLONOPIN) 2 MG tablet Take 1 tablet by mouth 2 times daily as needed (SEIZURES) TAKE TO ABORT SEIZURES 60 tablet 0    Melatonin 5 MG TABS tablet Take 1 tablet by mouth nightly      fexofenadine (ALLEGRA) 180 MG tablet Take 1 tablet by mouth daily 30 tablet 11    folic acid (FOLVITE) 289 MCG tablet Take 1 tablet by mouth daily 30 tablet 11    therapeutic multivitamin-minerals (THERAGRAN-M) tablet Take 1 tablet by mouth daily. No current facility-administered medications on file prior to visit. Review of Systems   Respiratory: Negative for cough, choking, chest tightness, shortness of breath and wheezing. Cardiovascular: Negative for chest pain, palpitations and leg swelling. Neurological: Negative for dizziness, light-headedness and headaches. Objective:   Physical Exam   Constitutional: He appears well-developed. No distress. Eyes: Conjunctivae are normal. Right eye exhibits no discharge. Left eye exhibits no discharge. No scleral icterus. Neck: Trachea normal and phonation normal. Neck supple. No JVD present. No tracheal tenderness present. Carotid bruit is not present. No tracheal deviation present. No thyroid mass and no thyromegaly present. Cardiovascular: Normal rate, regular rhythm, S1 normal, S2 normal and normal heart sounds. Exam reveals no gallop, no S3, no S4, no distant heart sounds and no friction rub. No murmur heard. Pulmonary/Chest: Effort normal and breath sounds normal. No stridor. No respiratory distress. He has no decreased breath sounds. He has no wheezes. He has no rhonchi. He has no rales. Abdominal: Soft. He exhibits no distension and no mass. There is no tenderness. There is no rigidity, no rebound, no guarding, no CVA tenderness, no tenderness at McBurney's point and negative Heaton's sign. Musculoskeletal:        Right shoulder: He exhibits deformity (scapular). He exhibits normal range of motion, no tenderness, no bony tenderness, no swelling, no effusion, no crepitus, no pain, no spasm and normal strength. Left shoulder: He exhibits crepitus and deformity. He exhibits normal range of motion, no tenderness, no bony tenderness, no swelling, no effusion, no pain, no spasm and normal strength. Bulging of left scapula when he lifts it.  No winging

## 2018-08-21 DIAGNOSIS — G40.209 PARTIAL SYMPTOMATIC EPILEPSY WITH COMPLEX PARTIAL SEIZURES, NOT INTRACTABLE, WITHOUT STATUS EPILEPTICUS (HCC): ICD-10-CM

## 2018-08-21 RX ORDER — GABAPENTIN 400 MG/1
CAPSULE ORAL
Qty: 120 CAPSULE | Refills: 2 | Status: SHIPPED | OUTPATIENT
Start: 2018-08-21 | End: 2018-12-19 | Stop reason: SDUPTHER

## 2018-10-22 ENCOUNTER — OFFICE VISIT (OUTPATIENT)
Dept: FAMILY MEDICINE CLINIC | Age: 38
End: 2018-10-22
Payer: COMMERCIAL

## 2018-10-22 VITALS
BODY MASS INDEX: 26.48 KG/M2 | HEART RATE: 76 BPM | OXYGEN SATURATION: 98 % | SYSTOLIC BLOOD PRESSURE: 124 MMHG | HEIGHT: 70 IN | WEIGHT: 185 LBS | RESPIRATION RATE: 17 BRPM | DIASTOLIC BLOOD PRESSURE: 74 MMHG

## 2018-10-22 DIAGNOSIS — F32.5 MAJOR DEPRESSION IN COMPLETE REMISSION (HCC): Chronic | ICD-10-CM

## 2018-10-22 DIAGNOSIS — G40.209 PARTIAL SYMPTOMATIC EPILEPSY WITH COMPLEX PARTIAL SEIZURES, NOT INTRACTABLE, WITHOUT STATUS EPILEPTICUS (HCC): Chronic | ICD-10-CM

## 2018-10-22 DIAGNOSIS — Z79.899 ENCOUNTER FOR LONG-TERM (CURRENT) USE OF HIGH-RISK MEDICATION: ICD-10-CM

## 2018-10-22 DIAGNOSIS — F31.78 BIPOLAR 1 DISORDER, MIXED, FULL REMISSION (HCC): ICD-10-CM

## 2018-10-22 DIAGNOSIS — F51.04 PSYCHOPHYSIOLOGICAL INSOMNIA: ICD-10-CM

## 2018-10-22 DIAGNOSIS — I10 ESSENTIAL HYPERTENSION: Primary | Chronic | ICD-10-CM

## 2018-10-22 DIAGNOSIS — F40.10 SOCIAL ANXIETY DISORDER: Chronic | ICD-10-CM

## 2018-10-22 LAB
AMPHETAMINE SCREEN, URINE: ABNORMAL
BARBITURATE SCREEN, URINE: ABNORMAL
BENZODIAZEPINE SCREEN, URINE: ABNORMAL
BUPRENORPHINE URINE: ABNORMAL
COCAINE METABOLITE SCREEN URINE: ABNORMAL
GABAPENTIN SCREEN, URINE: ABNORMAL
METHADONE SCREEN, URINE: ABNORMAL
METHAMPHETAMINE, URINE: ABNORMAL
OPIATE SCREEN URINE: ABNORMAL
OXYCODONE SCREEN URINE: ABNORMAL
PHENCYCLIDINE SCREEN URINE: ABNORMAL
PROPOXYPHENE SCREEN, URINE: ABNORMAL
THC SCREEN, URINE: ABNORMAL
TRICYCLIC ANTIDEPRESSANTS, UR: ABNORMAL

## 2018-10-22 PROCEDURE — G8419 CALC BMI OUT NRM PARAM NOF/U: HCPCS | Performed by: FAMILY MEDICINE

## 2018-10-22 PROCEDURE — G8484 FLU IMMUNIZE NO ADMIN: HCPCS | Performed by: FAMILY MEDICINE

## 2018-10-22 PROCEDURE — G8427 DOCREV CUR MEDS BY ELIG CLIN: HCPCS | Performed by: FAMILY MEDICINE

## 2018-10-22 PROCEDURE — 80305 DRUG TEST PRSMV DIR OPT OBS: CPT | Performed by: FAMILY MEDICINE

## 2018-10-22 PROCEDURE — 99214 OFFICE O/P EST MOD 30 MIN: CPT | Performed by: FAMILY MEDICINE

## 2018-10-22 PROCEDURE — 1036F TOBACCO NON-USER: CPT | Performed by: FAMILY MEDICINE

## 2018-10-22 RX ORDER — NADOLOL 20 MG/1
TABLET ORAL
Qty: 45 TABLET | Refills: 1 | Status: SHIPPED | OUTPATIENT
Start: 2018-10-22 | End: 2019-05-06 | Stop reason: SDUPTHER

## 2018-10-22 RX ORDER — TRAZODONE HYDROCHLORIDE 100 MG/1
50-100 TABLET ORAL NIGHTLY
Qty: 30 TABLET | Refills: 5 | Status: SHIPPED | OUTPATIENT
Start: 2018-10-22 | End: 2019-05-06 | Stop reason: SDUPTHER

## 2018-10-22 RX ORDER — CLONAZEPAM 1 MG/1
1 TABLET ORAL 2 TIMES DAILY
Qty: 60 TABLET | Refills: 2 | Status: SHIPPED | OUTPATIENT
Start: 2018-10-22 | End: 2019-01-21 | Stop reason: SDUPTHER

## 2018-10-22 RX ORDER — CLONAZEPAM 2 MG/1
2 TABLET ORAL 2 TIMES DAILY PRN
Qty: 30 TABLET | Refills: 0 | Status: SHIPPED | OUTPATIENT
Start: 2018-10-22 | End: 2019-08-09 | Stop reason: SDUPTHER

## 2018-10-22 RX ORDER — ZIPRASIDONE HYDROCHLORIDE 40 MG/1
40 CAPSULE ORAL DAILY
Qty: 30 CAPSULE | Refills: 5 | Status: SHIPPED | OUTPATIENT
Start: 2018-10-22 | End: 2019-05-06 | Stop reason: SDUPTHER

## 2018-10-22 RX ORDER — ZIPRASIDONE HYDROCHLORIDE 60 MG/1
60 CAPSULE ORAL 2 TIMES DAILY WITH MEALS
Qty: 60 CAPSULE | Refills: 5 | Status: SHIPPED | OUTPATIENT
Start: 2018-10-22 | End: 2019-05-06 | Stop reason: SDUPTHER

## 2018-10-22 ASSESSMENT — ENCOUNTER SYMPTOMS
SHORTNESS OF BREATH: 0
CHOKING: 0
CHEST TIGHTNESS: 0
COUGH: 0

## 2018-10-22 NOTE — PROGRESS NOTES
exhibits no discharge. Left eye exhibits no discharge. No scleral icterus. Neck: Trachea normal and phonation normal. Neck supple. No JVD present. No tracheal tenderness present. Carotid bruit is not present. No tracheal deviation present. No thyroid mass and no thyromegaly present. Cardiovascular: Normal rate, regular rhythm, S1 normal, S2 normal and normal heart sounds. Exam reveals no gallop, no S3, no S4, no distant heart sounds and no friction rub. No murmur heard. Pulmonary/Chest: Effort normal and breath sounds normal. No stridor. No respiratory distress. He has no decreased breath sounds. He has no wheezes. He has no rhonchi. He has no rales. Abdominal: Soft. He exhibits no distension and no mass. There is no tenderness. There is no rigidity, no rebound, no guarding, no CVA tenderness, no tenderness at McBurney's point and negative Heaton's sign. Lymphadenopathy:        Head (right side): No submandibular and no tonsillar adenopathy present. Head (left side): No submandibular and no tonsillar adenopathy present. He has no cervical adenopathy. Right cervical: No superficial cervical, no deep cervical and no posterior cervical adenopathy present. Left cervical: No superficial cervical, no deep cervical and no posterior cervical adenopathy present. Neurological: He is alert. Reflex Scores:       Patellar reflexes are 2+ on the right side and 2+ on the left side. Skin: Skin is warm and dry. He is not diaphoretic. No pallor. Psychiatric: He has a normal mood and affect. His speech is normal and behavior is normal. Judgment normal.   Nursing note and vitals reviewed.     Vitals:    10/22/18 1115   BP: 124/74   Site: Right Upper Arm   Position: Sitting   Cuff Size: Large Adult   Pulse: 76   Resp: 17   SpO2: 98%   Weight: 185 lb (83.9 kg)  Comment: PATIENT STATED   Height: 5' 10\" (1.778 m)     BP Readings from Last 3 Encounters:   10/22/18 124/74   07/23/18 102/60

## 2018-12-04 ENCOUNTER — TELEPHONE (OUTPATIENT)
Dept: FAMILY MEDICINE CLINIC | Age: 38
End: 2018-12-04

## 2018-12-04 DIAGNOSIS — G25.9 EXTRAPYRAMIDAL REACTION: ICD-10-CM

## 2018-12-04 DIAGNOSIS — F31.78 BIPOLAR 1 DISORDER, MIXED, FULL REMISSION (HCC): Primary | ICD-10-CM

## 2018-12-04 RX ORDER — TRIHEXYPHENIDYL HYDROCHLORIDE 5 MG/1
TABLET ORAL
Qty: 90 TABLET | Refills: 0 | Status: SHIPPED | OUTPATIENT
Start: 2018-12-04 | End: 2018-12-10 | Stop reason: SDUPTHER

## 2018-12-10 ENCOUNTER — TELEPHONE (OUTPATIENT)
Dept: FAMILY MEDICINE CLINIC | Age: 38
End: 2018-12-10

## 2018-12-10 DIAGNOSIS — G25.9 EXTRAPYRAMIDAL REACTION: ICD-10-CM

## 2018-12-10 DIAGNOSIS — F31.78 BIPOLAR 1 DISORDER, MIXED, FULL REMISSION (HCC): ICD-10-CM

## 2018-12-10 RX ORDER — TRIHEXYPHENIDYL HYDROCHLORIDE 5 MG/1
TABLET ORAL
Qty: 90 TABLET | Refills: 0 | Status: SHIPPED | OUTPATIENT
Start: 2018-12-10 | End: 2019-01-21 | Stop reason: SDUPTHER

## 2018-12-19 DIAGNOSIS — G40.209 PARTIAL SYMPTOMATIC EPILEPSY WITH COMPLEX PARTIAL SEIZURES, NOT INTRACTABLE, WITHOUT STATUS EPILEPTICUS (HCC): ICD-10-CM

## 2018-12-20 RX ORDER — GABAPENTIN 400 MG/1
CAPSULE ORAL
Qty: 120 CAPSULE | Refills: 0 | Status: SHIPPED | OUTPATIENT
Start: 2018-12-20 | End: 2019-01-21 | Stop reason: SDUPTHER

## 2019-01-09 DIAGNOSIS — K21.9 GASTROESOPHAGEAL REFLUX DISEASE, ESOPHAGITIS PRESENCE NOT SPECIFIED: ICD-10-CM

## 2019-01-09 RX ORDER — RANITIDINE 300 MG/1
TABLET ORAL
Qty: 30 TABLET | Refills: 0 | Status: SHIPPED | OUTPATIENT
Start: 2019-01-09 | End: 2019-01-21 | Stop reason: SDUPTHER

## 2019-01-21 ENCOUNTER — OFFICE VISIT (OUTPATIENT)
Dept: FAMILY MEDICINE CLINIC | Age: 39
End: 2019-01-21
Payer: COMMERCIAL

## 2019-01-21 ENCOUNTER — TELEPHONE (OUTPATIENT)
Dept: FAMILY MEDICINE CLINIC | Age: 39
End: 2019-01-21

## 2019-01-21 VITALS
BODY MASS INDEX: 25.91 KG/M2 | SYSTOLIC BLOOD PRESSURE: 130 MMHG | TEMPERATURE: 98 F | DIASTOLIC BLOOD PRESSURE: 78 MMHG | OXYGEN SATURATION: 98 % | HEART RATE: 96 BPM | WEIGHT: 181 LBS | HEIGHT: 70 IN | RESPIRATION RATE: 18 BRPM

## 2019-01-21 DIAGNOSIS — E78.1 HYPERTRIGLYCERIDEMIA: ICD-10-CM

## 2019-01-21 DIAGNOSIS — F31.78 BIPOLAR 1 DISORDER, MIXED, FULL REMISSION (HCC): Primary | Chronic | ICD-10-CM

## 2019-01-21 DIAGNOSIS — G25.9 EXTRAPYRAMIDAL REACTION: ICD-10-CM

## 2019-01-21 DIAGNOSIS — Z79.899 LONG TERM USE OF DRUG: ICD-10-CM

## 2019-01-21 DIAGNOSIS — E55.9 VITAMIN D DEFICIENCY: ICD-10-CM

## 2019-01-21 DIAGNOSIS — I10 ESSENTIAL HYPERTENSION: Chronic | ICD-10-CM

## 2019-01-21 DIAGNOSIS — F32.5 MAJOR DEPRESSION IN COMPLETE REMISSION (HCC): Chronic | ICD-10-CM

## 2019-01-21 DIAGNOSIS — K21.9 GASTROESOPHAGEAL REFLUX DISEASE, ESOPHAGITIS PRESENCE NOT SPECIFIED: ICD-10-CM

## 2019-01-21 DIAGNOSIS — G40.209 PARTIAL SYMPTOMATIC EPILEPSY WITH COMPLEX PARTIAL SEIZURES, NOT INTRACTABLE, WITHOUT STATUS EPILEPTICUS (HCC): Chronic | ICD-10-CM

## 2019-01-21 DIAGNOSIS — F40.10 SOCIAL ANXIETY DISORDER: Chronic | ICD-10-CM

## 2019-01-21 LAB
AMPHETAMINE SCREEN, URINE: ABNORMAL
AMPHETAMINE SCREEN, URINE: NORMAL
BARBITURATE SCREEN URINE: NORMAL
BARBITURATE SCREEN, URINE: ABNORMAL
BENZODIAZEPINE SCREEN, URINE: ABNORMAL
BENZODIAZEPINE SCREEN, URINE: NORMAL
BUPRENORPHINE URINE: ABNORMAL
CANNABINOID SCREEN URINE: NORMAL
COCAINE METABOLITE SCREEN URINE: ABNORMAL
COCAINE METABOLITE SCREEN URINE: NORMAL
GABAPENTIN SCREEN, URINE: ABNORMAL
Lab: NORMAL
MDMA URINE: ABNORMAL
METHADONE SCREEN, URINE: ABNORMAL
METHADONE SCREEN, URINE: NORMAL
METHAMPHETAMINE, URINE: ABNORMAL
OPIATE SCREEN URINE: ABNORMAL
OPIATE SCREEN URINE: NORMAL
OXYCODONE SCREEN URINE: ABNORMAL
OXYCODONE URINE: NORMAL
PH UA: 5
PHENCYCLIDINE SCREEN URINE: ABNORMAL
PHENCYCLIDINE SCREEN URINE: NORMAL
PROPOXYPHENE SCREEN, URINE: ABNORMAL
PROPOXYPHENE SCREEN: NORMAL
THC SCREEN, URINE: ABNORMAL
TRICYCLIC ANTIDEPRESSANTS, UR: ABNORMAL

## 2019-01-21 PROCEDURE — 99215 OFFICE O/P EST HI 40 MIN: CPT | Performed by: FAMILY MEDICINE

## 2019-01-21 PROCEDURE — 80305 DRUG TEST PRSMV DIR OPT OBS: CPT | Performed by: FAMILY MEDICINE

## 2019-01-21 PROCEDURE — G8419 CALC BMI OUT NRM PARAM NOF/U: HCPCS | Performed by: FAMILY MEDICINE

## 2019-01-21 PROCEDURE — G8427 DOCREV CUR MEDS BY ELIG CLIN: HCPCS | Performed by: FAMILY MEDICINE

## 2019-01-21 PROCEDURE — G8484 FLU IMMUNIZE NO ADMIN: HCPCS | Performed by: FAMILY MEDICINE

## 2019-01-21 PROCEDURE — 1036F TOBACCO NON-USER: CPT | Performed by: FAMILY MEDICINE

## 2019-01-21 RX ORDER — GABAPENTIN 400 MG/1
CAPSULE ORAL
Qty: 120 CAPSULE | Refills: 5 | Status: SHIPPED | OUTPATIENT
Start: 2019-01-21 | End: 2019-08-09 | Stop reason: SDUPTHER

## 2019-01-21 RX ORDER — RANITIDINE 300 MG/1
TABLET ORAL
Qty: 30 TABLET | Refills: 5 | Status: SHIPPED | OUTPATIENT
Start: 2019-01-21 | End: 2019-08-09 | Stop reason: SDUPTHER

## 2019-01-21 RX ORDER — GABAPENTIN 400 MG/1
CAPSULE ORAL
Qty: 120 CAPSULE | Refills: 5 | Status: SHIPPED | OUTPATIENT
Start: 2019-01-21 | End: 2019-01-21 | Stop reason: CLARIF

## 2019-01-21 RX ORDER — AMLODIPINE BESYLATE 2.5 MG/1
TABLET ORAL
Qty: 30 TABLET | Refills: 5 | Status: SHIPPED | OUTPATIENT
Start: 2019-01-21 | End: 2019-08-09 | Stop reason: SDUPTHER

## 2019-01-21 RX ORDER — RANITIDINE 300 MG/1
TABLET ORAL
Qty: 30 TABLET | Refills: 5 | Status: SHIPPED | OUTPATIENT
Start: 2019-01-21 | End: 2019-01-21 | Stop reason: CLARIF

## 2019-01-21 RX ORDER — CLONAZEPAM 1 MG/1
1 TABLET ORAL 2 TIMES DAILY
Qty: 60 TABLET | Refills: 2 | Status: SHIPPED | OUTPATIENT
Start: 2019-01-21 | End: 2019-05-06 | Stop reason: SDUPTHER

## 2019-01-21 RX ORDER — LAMOTRIGINE 200 MG/1
TABLET ORAL
Qty: 60 TABLET | Refills: 5 | Status: SHIPPED | OUTPATIENT
Start: 2019-01-21 | End: 2019-01-21 | Stop reason: CLARIF

## 2019-01-21 RX ORDER — AMLODIPINE BESYLATE 2.5 MG/1
TABLET ORAL
Qty: 30 TABLET | Refills: 5 | Status: SHIPPED | OUTPATIENT
Start: 2019-01-21 | End: 2019-01-21 | Stop reason: CLARIF

## 2019-01-21 RX ORDER — TRIHEXYPHENIDYL HYDROCHLORIDE 5 MG/1
TABLET ORAL
Qty: 90 TABLET | Refills: 0 | Status: SHIPPED | OUTPATIENT
Start: 2019-01-21 | End: 2019-05-06 | Stop reason: DRUGHIGH

## 2019-01-21 RX ORDER — CLONAZEPAM 1 MG/1
1 TABLET ORAL 2 TIMES DAILY
Qty: 60 TABLET | Refills: 2 | Status: SHIPPED | OUTPATIENT
Start: 2019-01-21 | End: 2019-01-21 | Stop reason: CLARIF

## 2019-01-21 RX ORDER — TRIHEXYPHENIDYL HYDROCHLORIDE 5 MG/1
TABLET ORAL
Qty: 90 TABLET | Refills: 0 | Status: SHIPPED | OUTPATIENT
Start: 2019-01-21 | End: 2019-01-21 | Stop reason: CLARIF

## 2019-01-21 RX ORDER — LAMOTRIGINE 200 MG/1
TABLET ORAL
Qty: 60 TABLET | Refills: 5 | Status: SHIPPED | OUTPATIENT
Start: 2019-01-21 | End: 2019-08-09 | Stop reason: SDUPTHER

## 2019-02-01 ENCOUNTER — TELEPHONE (OUTPATIENT)
Dept: FAMILY MEDICINE CLINIC | Age: 39
End: 2019-02-01

## 2019-03-25 ENCOUNTER — HOSPITAL ENCOUNTER (OUTPATIENT)
Age: 39
Discharge: HOME | End: 2019-03-25
Payer: COMMERCIAL

## 2019-03-25 DIAGNOSIS — E55.9 VITAMIN D DEFICIENCY: ICD-10-CM

## 2019-03-25 DIAGNOSIS — I10 ESSENTIAL HYPERTENSION: Chronic | ICD-10-CM

## 2019-03-25 DIAGNOSIS — E78.1 HYPERTRIGLYCERIDEMIA: ICD-10-CM

## 2019-03-25 LAB
ANION GAP SERPL CALCULATED.3IONS-SCNC: 12 MMOL/L (ref 3–16)
BUN BLDV-MCNC: 13 MG/DL (ref 7–20)
CALCIUM SERPL-MCNC: 9.7 MG/DL (ref 8.3–10.6)
CHLORIDE BLD-SCNC: 100 MMOL/L (ref 99–110)
CHOLESTEROL, FASTING: 214 MG/DL (ref 0–199)
CO2: 29 MMOL/L (ref 21–32)
CREAT SERPL-MCNC: 1 MG/DL (ref 0.9–1.3)
GFR AFRICAN AMERICAN: >60
GFR NON-AFRICAN AMERICAN: >60
GLUCOSE BLD-MCNC: 107 MG/DL (ref 70–99)
HDLC SERPL-MCNC: 56 MG/DL (ref 40–60)
LDL CHOLESTEROL CALCULATED: 135 MG/DL
POTASSIUM SERPL-SCNC: 4.9 MMOL/L (ref 3.5–5.1)
SODIUM BLD-SCNC: 141 MMOL/L (ref 136–145)
TRIGLYCERIDE, FASTING: 114 MG/DL (ref 0–150)
VITAMIN D 25-HYDROXY: 26.8 NG/ML
VLDLC SERPL CALC-MCNC: 23 MG/DL

## 2019-03-25 PROCEDURE — 80048 BASIC METABOLIC PNL TOTAL CA: CPT

## 2019-03-25 PROCEDURE — 82306 VITAMIN D 25 HYDROXY: CPT

## 2019-03-25 PROCEDURE — 80061 LIPID PANEL: CPT

## 2019-03-25 PROCEDURE — 36415 COLL VENOUS BLD VENIPUNCTURE: CPT

## 2019-05-06 ENCOUNTER — OFFICE VISIT (OUTPATIENT)
Dept: FAMILY MEDICINE CLINIC | Age: 39
End: 2019-05-06
Payer: COMMERCIAL

## 2019-05-06 VITALS
SYSTOLIC BLOOD PRESSURE: 114 MMHG | WEIGHT: 175 LBS | HEART RATE: 84 BPM | OXYGEN SATURATION: 98 % | RESPIRATION RATE: 21 BRPM | HEIGHT: 70 IN | BODY MASS INDEX: 25.05 KG/M2 | DIASTOLIC BLOOD PRESSURE: 68 MMHG

## 2019-05-06 DIAGNOSIS — F51.04 PSYCHOPHYSIOLOGICAL INSOMNIA: ICD-10-CM

## 2019-05-06 DIAGNOSIS — F32.5 MAJOR DEPRESSION IN COMPLETE REMISSION (HCC): Chronic | ICD-10-CM

## 2019-05-06 DIAGNOSIS — F40.10 SOCIAL ANXIETY DISORDER: Chronic | ICD-10-CM

## 2019-05-06 DIAGNOSIS — G25.89 EXTRAPYRAMIDAL MOVEMENT DISORDER, DRUG-INDUCED: ICD-10-CM

## 2019-05-06 DIAGNOSIS — I10 ESSENTIAL HYPERTENSION: Primary | Chronic | ICD-10-CM

## 2019-05-06 DIAGNOSIS — G40.209 PARTIAL SYMPTOMATIC EPILEPSY WITH COMPLEX PARTIAL SEIZURES, NOT INTRACTABLE, WITHOUT STATUS EPILEPTICUS (HCC): Chronic | ICD-10-CM

## 2019-05-06 DIAGNOSIS — T50.905A EXTRAPYRAMIDAL MOVEMENT DISORDER, DRUG-INDUCED: ICD-10-CM

## 2019-05-06 DIAGNOSIS — F31.78 BIPOLAR 1 DISORDER, MIXED, FULL REMISSION (HCC): ICD-10-CM

## 2019-05-06 PROCEDURE — G8427 DOCREV CUR MEDS BY ELIG CLIN: HCPCS | Performed by: FAMILY MEDICINE

## 2019-05-06 PROCEDURE — 99214 OFFICE O/P EST MOD 30 MIN: CPT | Performed by: FAMILY MEDICINE

## 2019-05-06 PROCEDURE — 1036F TOBACCO NON-USER: CPT | Performed by: FAMILY MEDICINE

## 2019-05-06 PROCEDURE — G8419 CALC BMI OUT NRM PARAM NOF/U: HCPCS | Performed by: FAMILY MEDICINE

## 2019-05-06 RX ORDER — TRAZODONE HYDROCHLORIDE 100 MG/1
50-100 TABLET ORAL NIGHTLY
Qty: 30 TABLET | Refills: 5 | Status: SHIPPED | OUTPATIENT
Start: 2019-05-06 | End: 2020-08-31

## 2019-05-06 RX ORDER — NADOLOL 20 MG/1
TABLET ORAL
Qty: 45 TABLET | Refills: 1 | Status: SHIPPED | OUTPATIENT
Start: 2019-05-06 | End: 2019-08-09 | Stop reason: SDUPTHER

## 2019-05-06 RX ORDER — CLONAZEPAM 1 MG/1
1 TABLET ORAL 2 TIMES DAILY
Qty: 60 TABLET | Refills: 2 | Status: SHIPPED | OUTPATIENT
Start: 2019-05-06 | End: 2019-08-09 | Stop reason: SDUPTHER

## 2019-05-06 RX ORDER — ZIPRASIDONE HYDROCHLORIDE 60 MG/1
60 CAPSULE ORAL 2 TIMES DAILY WITH MEALS
Qty: 60 CAPSULE | Refills: 5 | Status: SHIPPED | OUTPATIENT
Start: 2019-05-06 | End: 2019-11-07 | Stop reason: SDUPTHER

## 2019-05-06 RX ORDER — TRIHEXYPHENIDYL HYDROCHLORIDE 2 MG/1
2 TABLET ORAL 2 TIMES DAILY
Qty: 60 TABLET | Refills: 5 | Status: SHIPPED | OUTPATIENT
Start: 2019-05-06 | End: 2019-11-07 | Stop reason: SDUPTHER

## 2019-05-06 RX ORDER — TRIHEXYPHENIDYL HYDROCHLORIDE 2 MG/1
2 TABLET ORAL 2 TIMES DAILY
COMMUNITY
End: 2019-05-06 | Stop reason: SDUPTHER

## 2019-05-06 RX ORDER — ZIPRASIDONE HYDROCHLORIDE 40 MG/1
40 CAPSULE ORAL DAILY
Qty: 30 CAPSULE | Refills: 5 | Status: SHIPPED | OUTPATIENT
Start: 2019-05-06 | End: 2019-11-07 | Stop reason: SDUPTHER

## 2019-05-06 SDOH — ECONOMIC STABILITY: TRANSPORTATION INSECURITY
IN THE PAST 12 MONTHS, HAS THE LACK OF TRANSPORTATION KEPT YOU FROM MEDICAL APPOINTMENTS OR FROM GETTING MEDICATIONS?: YES

## 2019-05-06 SDOH — ECONOMIC STABILITY: TRANSPORTATION INSECURITY
IN THE PAST 12 MONTHS, HAS LACK OF TRANSPORTATION KEPT YOU FROM MEETINGS, WORK, OR FROM GETTING THINGS NEEDED FOR DAILY LIVING?: YES

## 2019-05-06 ASSESSMENT — ENCOUNTER SYMPTOMS
WHEEZING: 0
CHOKING: 0
CHEST TIGHTNESS: 0
SHORTNESS OF BREATH: 0
COUGH: 0

## 2019-05-06 ASSESSMENT — PATIENT HEALTH QUESTIONNAIRE - PHQ9: DEPRESSION UNABLE TO ASSESS: PT REFUSES

## 2019-05-06 NOTE — PATIENT INSTRUCTIONS
Kevyn Boles was seen today for manic behavior. Diagnoses and all orders for this visit:    Essential hypertension  -     nadolol (CORGARD) 20 MG tablet; 1/2 tab a daily by mouth For high blood pressure. Partial symptomatic epilepsy with complex partial seizures, not intractable, without status epilepticus (HCC)  -     clonazePAM (KLONOPIN) 1 MG tablet; Take 1 tablet by mouth 2 times daily for 90 days. Psychophysiological insomnia  -     traZODone (DESYREL) 100 MG tablet; Take 0.5-1 tablets by mouth nightly    Bipolar 1 disorder, mixed, full remission (HCC)  -     ziprasidone (GEODON) 60 MG capsule; Take 1 capsule by mouth 2 times daily (with meals) Take a 60 mg with a 40 mg at dinner  -     ziprasidone (GEODON) 40 MG capsule; Take 1 capsule by mouth daily Take with 60 mg tab at dinner    Major depression in complete remission (HCC)  -     ziprasidone (GEODON) 60 MG capsule; Take 1 capsule by mouth 2 times daily (with meals) Take a 60 mg with a 40 mg at dinner  -     ziprasidone (GEODON) 40 MG capsule; Take 1 capsule by mouth daily Take with 60 mg tab at dinner    Social anxiety disorder  -     clonazePAM (KLONOPIN) 1 MG tablet; Take 1 tablet by mouth 2 times daily for 90 days. Extrapyramidal movement disorder, drug-induced  -     trihexyphenidyl (ARTANE) 2 MG tablet; Take 1 tablet by mouth 2 times daily    Hypercholesterolemia   Continue to watch your diet:  Avoid fatty oily greasy fried foods. Eat small portions of lean meats with emphasis on skinless poultry and fish. Limit egg yolks to 2-4 per week. Keep up the exercise program to keep the good cholesterol high to help remove the bad cholesterol. Yearly recheck.

## 2019-05-06 NOTE — PROGRESS NOTES
Subjective:      Patient ID: Dolly Brooke is a 41 y.o. male. Chief Complaint   Patient presents with    Manic Behavior     PT HERE FOR ROUTINE FOLLOW UP ON BIPOLAR DISORDER AND NEEDS REFILLS ON SCRIPTS. 350  HPI    Extrapyramidal movement disorder, drug-induced  -     trihexyphenidyl (ARTANE) 2 MG tablet; Take 1 tablet by mouth 2 times daily  Was on 5 mg 1/2 pill bid due to no availability. No wants to go back to 2 mg bid. Essential hypertension  -     nadolol (CORGARD) 20 MG tablet; 1/2 tab a daily by mouth For high blood pressure. Is not checking. Partial symptomatic epilepsy with complex partial seizures, not intractable, without status epilepticus (HCC)  -     clonazePAM (KLONOPIN) 1 MG tablet; Take 1 tablet by mouth 2 times daily for 90 days. No Sz activity since last seen. Psychophysiological insomnia  -     traZODone (DESYREL) 100 MG tablet; Take 0.5-1 tablets by mouth nightly  Is sleeping well. No SE's noted with med. No dry mouth. Bipolar 1 disorder, mixed, full remission ([facility] [address]  -     ziprasidone (GEODON) 60 MG capsule; Take 1 capsule by mouth 2 times daily (with meals) Take a 60 mg with a 40 mg at dinner  -     ziprasidone (GEODON) 40 MG capsule; Take 1 capsule by mouth daily Take with 60 mg tab at dinner  Is living at home again. Feels much more relaxed now that he is at home. GF felt to be messing with meds. He fills med box and then takes it so would not know if something is missing. Major depression in complete remission (HCC)  -     ziprasidone (GEODON) 60 MG capsule; Take 1 capsule by mouth 2 times daily (with meals) Take a 60 mg with a 40 mg at dinner  -     ziprasidone (GEODON) 40 MG capsule; Take 1 capsule by mouth daily Take with 60 mg tab at dinner  Is sleeping fine. Is not getting involved with activities. Camps when weather is good. Social anxiety disorder  -     clonazePAM (KLONOPIN) 1 MG tablet; Take 1 tablet by mouth 2 times daily for 90 days.      Current Outpatient Medications   Medication Sig Dispense Refill    traZODone (DESYREL) 100 MG tablet Take 0.5-1 tablets by mouth nightly 30 tablet 5    ziprasidone (GEODON) 60 MG capsule Take 1 capsule by mouth 2 times daily (with meals) Take a 60 mg with a 40 mg at dinner 60 capsule 5    ziprasidone (GEODON) 40 MG capsule Take 1 capsule by mouth daily Take with 60 mg tab at dinner 30 capsule 5    nadolol (CORGARD) 20 MG tablet 1/2 tab a daily by mouth For high blood pressure. 45 tablet 1    clonazePAM (KLONOPIN) 1 MG tablet Take 1 tablet by mouth 2 times daily for 90 days. 60 tablet 2    trihexyphenidyl (ARTANE) 2 MG tablet Take 1 tablet by mouth 2 times daily 60 tablet 5    amLODIPine (NORVASC) 2.5 MG tablet TAKE 1 TABLET BY MOUTH DAILY FOR HIGH BLOOD PRESSURE 30 tablet 5    gabapentin (NEURONTIN) 400 MG capsule TAKE 4 CAPS BY MOUTH NIGHTLY FOR SLEEP. [facility] [facility] 120 capsule 5    lamoTRIgine (LAMICTAL) 200 MG tablet TAKE 2 TABLETS 1 TIME DAILY AT DINNER 60 tablet 5    ranitidine (ZANTAC) 300 MG tablet TAKE ONE TABLET EVERY NIGHT AT BEDTIME 30 tablet 5    clonazePAM (KLONOPIN) 2 MG tablet Take 1 tablet by mouth 2 times daily as needed (SEIZURES) for up to 90 days. TAKE TO ABORT SEIZURES . 30 tablet 0    GLUCOSAMINE-CHONDROITIN ER PO Take by mouth 2 times daily      guaiFENesin (MUCINEX) 600 MG extended release tablet Take 600 mg by mouth daily       Melatonin 5 MG TABS tablet Take 1 tablet by mouth nightly      fexofenadine (ALLEGRA) 180 MG tablet Take 1 tablet by mouth daily 30 tablet 11    folic acid (FOLVITE) 765 MCG tablet Take 1 tablet by mouth daily 30 tablet 11    therapeutic multivitamin-minerals (THERAGRAN-M) tablet Take 1 tablet by mouth daily. No current facility-administered medications for this visit. Review of Systems   Respiratory: Negative for cough, choking, chest tightness, shortness of breath and wheezing. Cardiovascular: Negative for chest pain, palpitations and leg swelling.    Neurological: Negative for dizziness, light-headedness and headaches. Objective:   Physical Exam   Constitutional: He appears well-developed. No distress. Eyes: Conjunctivae are normal. Right eye exhibits no discharge. Left eye exhibits no discharge. No scleral icterus. Neck: Trachea normal and phonation normal. Neck supple. No JVD present. No tracheal tenderness present. Carotid bruit is not present. No tracheal deviation present. No thyroid mass and no thyromegaly present. Cardiovascular: Normal rate, regular rhythm, S1 normal, S2 normal and normal heart sounds. Exam reveals no gallop, no S3, no S4, no distant heart sounds and no friction rub. No murmur heard. Pulmonary/Chest: Effort normal and breath sounds normal. No stridor. No respiratory distress. He has no decreased breath sounds. He has no wheezes. He has no rhonchi. He has no rales. Abdominal: Soft. He exhibits no distension. There is no tenderness (epigastric). Lymphadenopathy:        Head (right side): No submandibular and no tonsillar adenopathy present. Head (left side): No submandibular and no tonsillar adenopathy present. He has no cervical adenopathy. Right cervical: No superficial cervical, no deep cervical and no posterior cervical adenopathy present. Left cervical: No superficial cervical, no deep cervical and no posterior cervical adenopathy present. Neurological: He is alert. Reflex Scores:       Patellar reflexes are 2+ on the right side and 2+ on the left side. Skin: Skin is warm and dry. He is not diaphoretic. No pallor. Psychiatric: He has a normal mood and affect. His speech is normal and behavior is normal. Judgment normal.   Nursing note and vitals reviewed.     Vitals:    05/06/19 1510   BP: 114/68   Site: Right Upper Arm   Position: Sitting   Cuff Size: Medium Adult   Pulse: 84   Resp: 21   SpO2: 98%   Weight: 175 lb (79.4 kg)  Comment: shoes on   Height: 5' 10\" (1.778 m)     BP Readings from Last 3 Encounters:   05/06/19 114/68   01/21/19 130/78   10/22/18 124/74     Pulse Readings from Last 3 Encounters:   05/06/19 84   01/21/19 96   10/22/18 76     Wt Readings from Last 3 Encounters:   05/06/19 175 lb (79.4 kg)   01/21/19 181 lb (82.1 kg)   10/22/18 185 lb (83.9 kg)     Body mass index is 25.11 kg/m². 3/25/2019 11:52   Sodium 141   Potassium 4.9   Chloride 100   CO2 29   BUN 13   Creatinine 1.0   Anion Gap 12   GFR Non-African American >60   Glucose 107 (H)   Calcium 9.7   Cholesterol, Fasting 214 (H)   HDL Cholesterol 56   LDL Calculated 135 (H)   Triglyceride, Fasting 114   VLDL Cholesterol Calculated 23   Vit D, 25-Hydroxy 26.8 (L)     Assessment:      1. Essential hypertension    2. Partial symptomatic epilepsy with complex partial seizures, not intractable, without status epilepticus ([facility] [address]    3. Psychophysiological insomnia    4. Bipolar 1 disorder, mixed, full remission ([facility] [address]    5. Major depression in complete remission ([facility] [address]    6. Social anxiety disorder    7. Extrapyramidal movement disorder, drug-induced          Plan:      Ayaz Rangel was seen today for manic behavior. Diagnoses and all orders for this visit:    Essential hypertension  -     nadolol (CORGARD) 20 MG tablet; 1/2 tab a daily by mouth For high blood pressure. Partial symptomatic epilepsy with complex partial seizures, not intractable, without status epilepticus (HCC)  -     clonazePAM (KLONOPIN) 1 MG tablet; Take 1 tablet by mouth 2 times daily for 90 days. Psychophysiological insomnia  -     traZODone (DESYREL) 100 MG tablet; Take 0.5-1 tablets by mouth nightly    Bipolar 1 disorder, mixed, full remission (HCC)  -     ziprasidone (GEODON) 60 MG capsule; Take 1 capsule by mouth 2 times daily (with meals) Take a 60 mg with a 40 mg at dinner  -     ziprasidone (GEODON) 40 MG capsule;  Take 1 capsule by mouth daily Take with 60 mg tab at dinner    Major depression in complete remission (HCC)  -     ziprasidone (GEODON) 60 MG

## 2019-08-09 ENCOUNTER — OFFICE VISIT (OUTPATIENT)
Dept: FAMILY MEDICINE CLINIC | Age: 39
End: 2019-08-09
Payer: COMMERCIAL

## 2019-08-09 VITALS
HEART RATE: 83 BPM | OXYGEN SATURATION: 98 % | BODY MASS INDEX: 26.48 KG/M2 | SYSTOLIC BLOOD PRESSURE: 128 MMHG | RESPIRATION RATE: 21 BRPM | HEIGHT: 70 IN | WEIGHT: 185 LBS | DIASTOLIC BLOOD PRESSURE: 84 MMHG

## 2019-08-09 DIAGNOSIS — I10 ESSENTIAL HYPERTENSION: Primary | Chronic | ICD-10-CM

## 2019-08-09 DIAGNOSIS — F40.10 SOCIAL ANXIETY DISORDER: Chronic | ICD-10-CM

## 2019-08-09 DIAGNOSIS — K21.9 GASTROESOPHAGEAL REFLUX DISEASE, ESOPHAGITIS PRESENCE NOT SPECIFIED: ICD-10-CM

## 2019-08-09 DIAGNOSIS — E78.00 HYPERCHOLESTEROLEMIA: ICD-10-CM

## 2019-08-09 DIAGNOSIS — F31.78 BIPOLAR 1 DISORDER, MIXED, FULL REMISSION (HCC): Chronic | ICD-10-CM

## 2019-08-09 DIAGNOSIS — E55.9 VITAMIN D DEFICIENCY: ICD-10-CM

## 2019-08-09 DIAGNOSIS — G40.209 PARTIAL SYMPTOMATIC EPILEPSY WITH COMPLEX PARTIAL SEIZURES, NOT INTRACTABLE, WITHOUT STATUS EPILEPTICUS (HCC): Chronic | ICD-10-CM

## 2019-08-09 PROCEDURE — 1036F TOBACCO NON-USER: CPT | Performed by: FAMILY MEDICINE

## 2019-08-09 PROCEDURE — G8419 CALC BMI OUT NRM PARAM NOF/U: HCPCS | Performed by: FAMILY MEDICINE

## 2019-08-09 PROCEDURE — G8427 DOCREV CUR MEDS BY ELIG CLIN: HCPCS | Performed by: FAMILY MEDICINE

## 2019-08-09 PROCEDURE — 99214 OFFICE O/P EST MOD 30 MIN: CPT | Performed by: FAMILY MEDICINE

## 2019-08-09 RX ORDER — NADOLOL 20 MG/1
TABLET ORAL
Qty: 45 TABLET | Refills: 1 | Status: SHIPPED | OUTPATIENT
Start: 2019-08-09 | End: 2019-11-27 | Stop reason: SDUPTHER

## 2019-08-09 RX ORDER — ATORVASTATIN CALCIUM 10 MG/1
10 TABLET, FILM COATED ORAL DAILY
Qty: 90 TABLET | Refills: 1 | Status: SHIPPED | OUTPATIENT
Start: 2019-08-09 | End: 2020-03-03

## 2019-08-09 RX ORDER — GABAPENTIN 400 MG/1
CAPSULE ORAL
Qty: 120 CAPSULE | Refills: 5 | Status: SHIPPED | OUTPATIENT
Start: 2019-08-09 | End: 2020-05-26

## 2019-08-09 RX ORDER — OMEPRAZOLE 20 MG/1
20 CAPSULE, DELAYED RELEASE ORAL
Qty: 30 CAPSULE | Refills: 1 | Status: SHIPPED | OUTPATIENT
Start: 2019-08-09 | End: 2019-10-10 | Stop reason: SDUPTHER

## 2019-08-09 RX ORDER — RANITIDINE 300 MG/1
TABLET ORAL
Qty: 30 TABLET | Refills: 5 | Status: SHIPPED | OUTPATIENT
Start: 2019-08-09 | End: 2019-12-03

## 2019-08-09 RX ORDER — CLONAZEPAM 1 MG/1
1 TABLET ORAL 2 TIMES DAILY
Qty: 60 TABLET | Refills: 2 | Status: SHIPPED | OUTPATIENT
Start: 2019-08-09 | End: 2019-11-18 | Stop reason: SDUPTHER

## 2019-08-09 RX ORDER — AMLODIPINE BESYLATE 2.5 MG/1
TABLET ORAL
Qty: 30 TABLET | Refills: 5 | Status: SHIPPED | OUTPATIENT
Start: 2019-08-09 | End: 2020-03-03

## 2019-08-09 RX ORDER — CLONAZEPAM 2 MG/1
2 TABLET ORAL 2 TIMES DAILY PRN
Qty: 30 TABLET | Refills: 0 | Status: SHIPPED | OUTPATIENT
Start: 2019-08-09 | End: 2019-12-03

## 2019-08-09 RX ORDER — LAMOTRIGINE 200 MG/1
TABLET ORAL
Qty: 60 TABLET | Refills: 5 | Status: SHIPPED | OUTPATIENT
Start: 2019-08-09 | End: 2020-03-23 | Stop reason: SDUPTHER

## 2019-08-09 SDOH — ECONOMIC STABILITY: FOOD INSECURITY: WITHIN THE PAST 12 MONTHS, THE FOOD YOU BOUGHT JUST DIDN'T LAST AND YOU DIDN'T HAVE MONEY TO GET MORE.: NEVER TRUE

## 2019-08-09 SDOH — ECONOMIC STABILITY: FOOD INSECURITY: WITHIN THE PAST 12 MONTHS, YOU WORRIED THAT YOUR FOOD WOULD RUN OUT BEFORE YOU GOT MONEY TO BUY MORE.: NEVER TRUE

## 2019-08-09 SDOH — ECONOMIC STABILITY: INCOME INSECURITY: HOW HARD IS IT FOR YOU TO PAY FOR THE VERY BASICS LIKE FOOD, HOUSING, MEDICAL CARE, AND HEATING?: NOT HARD AT ALL

## 2019-08-09 ASSESSMENT — ENCOUNTER SYMPTOMS
BLOOD IN STOOL: 0
NAUSEA: 0
WHEEZING: 0
ABDOMINAL PAIN: 1
SHORTNESS OF BREATH: 0
ABDOMINAL DISTENTION: 0
CHOKING: 0
CHEST TIGHTNESS: 0
COUGH: 0
CONSTIPATION: 0
DIARRHEA: 1
VOMITING: 0

## 2019-08-09 NOTE — PROGRESS NOTES
SLEEP. Ernestina Syed 120 capsule 5    lamoTRIgine (LAMICTAL) 200 MG tablet TAKE 2 TABLETS 1 TIME DAILY AT DINNER 60 tablet 5    ranitidine (ZANTAC) 300 MG tablet TAKE ONE TABLET EVERY NIGHT AT BEDTIME 30 tablet 5    clonazePAM (KLONOPIN) 2 MG tablet Take 1 tablet by mouth 2 times daily as needed (SEIZURES) for up to 90 days. TAKE TO ABORT SEIZURES . 30 tablet 0    GLUCOSAMINE-CHONDROITIN ER PO Take by mouth 2 times daily      guaiFENesin (MUCINEX) 600 MG extended release tablet Take 600 mg by mouth daily       Melatonin 5 MG TABS tablet Take 1 tablet by mouth nightly      fexofenadine (ALLEGRA) 180 MG tablet Take 1 tablet by mouth daily 30 tablet 11    folic acid (FOLVITE) 876 MCG tablet Take 1 tablet by mouth daily 30 tablet 11    therapeutic multivitamin-minerals (THERAGRAN-M) tablet Take 1 tablet by mouth daily. No current facility-administered medications for this visit. Review of Systems   Constitutional: Negative for chills, diaphoresis and fever. Respiratory: Negative for cough, choking, chest tightness, shortness of breath and wheezing. Cardiovascular: Negative for chest pain, palpitations and leg swelling. Gastrointestinal: Positive for abdominal pain and diarrhea (few days ago urgency). Negative for abdominal distention, blood in stool (no black tarry either), constipation, nausea and vomiting. Neurological: Negative for dizziness, light-headedness and headaches. Objective:   Physical Exam   Constitutional: He appears well-developed. No distress. Eyes: Conjunctivae are normal. Right eye exhibits no discharge. Left eye exhibits no discharge. No scleral icterus. Neck: Trachea normal and phonation normal. Neck supple. No JVD present. No tracheal tenderness present. Carotid bruit is not present. No tracheal deviation present. No thyroid mass and no thyromegaly present. Cardiovascular: Normal rate, regular rhythm, S1 normal, S2 normal and normal heart sounds.  Exam reveals no gallop, no S3, no S4, no distant heart sounds and no friction rub. No murmur heard. Pulmonary/Chest: Effort normal and breath sounds normal. No stridor. No respiratory distress. He has no decreased breath sounds. He has no wheezes. He has no rhonchi. He has no rales. Abdominal: Soft. Normal appearance. He exhibits no distension, no pulsatile midline mass and no mass. There is no hepatosplenomegaly. There is tenderness in the epigastric area. There is no rigidity, no guarding, no tenderness at McBurney's point and negative Heaton's sign. Lymphadenopathy:        Head (right side): No submandibular and no tonsillar adenopathy present. Head (left side): No submandibular and no tonsillar adenopathy present. He has no cervical adenopathy. Right cervical: No superficial cervical, no deep cervical and no posterior cervical adenopathy present. Left cervical: No superficial cervical, no deep cervical and no posterior cervical adenopathy present. Neurological: He is alert. Reflex Scores:       Patellar reflexes are 2+ on the right side and 2+ on the left side. Skin: Skin is warm and dry. He is not diaphoretic. No pallor. Psychiatric: He has a normal mood and affect. His speech is normal and behavior is normal. Judgment normal.   Nursing note and vitals reviewed. Vitals:    08/09/19 1521   BP: 128/84   Site: Right Upper Arm   Position: Sitting   Cuff Size: Medium Adult   Pulse: 83   Resp: 21   SpO2: 98%   Weight: 185 lb (83.9 kg)   Height: 5' 10\" (1.778 m)     BP Readings from Last 3 Encounters:   08/09/19 128/84   05/06/19 114/68   01/21/19 130/78     Pulse Readings from Last 3 Encounters:   08/09/19 83   05/06/19 84   01/21/19 96     Wt Readings from Last 3 Encounters:   08/09/19 185 lb (83.9 kg)   05/06/19 175 lb (79.4 kg)   01/21/19 181 lb (82.1 kg)     Body mass index is 26.54 kg/m². Assessment:      1. Essential hypertension    2.  Partial symptomatic epilepsy with complex partial start after on Vitamin D for 6 weeks.       Neisha Carranza DO

## 2019-10-10 DIAGNOSIS — K21.9 GASTROESOPHAGEAL REFLUX DISEASE, ESOPHAGITIS PRESENCE NOT SPECIFIED: ICD-10-CM

## 2019-10-10 RX ORDER — OMEPRAZOLE 20 MG/1
20 CAPSULE, DELAYED RELEASE ORAL
Qty: 30 CAPSULE | Refills: 0 | Status: SHIPPED | OUTPATIENT
Start: 2019-10-10 | End: 2019-11-27 | Stop reason: SDUPTHER

## 2019-11-07 DIAGNOSIS — T50.905A EXTRAPYRAMIDAL MOVEMENT DISORDER, DRUG-INDUCED: ICD-10-CM

## 2019-11-07 DIAGNOSIS — F31.78 BIPOLAR 1 DISORDER, MIXED, FULL REMISSION (HCC): ICD-10-CM

## 2019-11-07 DIAGNOSIS — F32.5 MAJOR DEPRESSION IN COMPLETE REMISSION (HCC): Chronic | ICD-10-CM

## 2019-11-07 DIAGNOSIS — G25.89 EXTRAPYRAMIDAL MOVEMENT DISORDER, DRUG-INDUCED: ICD-10-CM

## 2019-11-07 RX ORDER — ZIPRASIDONE HYDROCHLORIDE 40 MG/1
40 CAPSULE ORAL DAILY
Qty: 30 CAPSULE | Refills: 0 | Status: SHIPPED | OUTPATIENT
Start: 2019-11-07 | End: 2019-11-27 | Stop reason: SDUPTHER

## 2019-11-07 RX ORDER — TRIHEXYPHENIDYL HYDROCHLORIDE 2 MG/1
2 TABLET ORAL 2 TIMES DAILY
Qty: 60 TABLET | Refills: 0 | Status: SHIPPED | OUTPATIENT
Start: 2019-11-07 | End: 2019-11-27 | Stop reason: SDUPTHER

## 2019-11-07 RX ORDER — ZIPRASIDONE HYDROCHLORIDE 60 MG/1
60 CAPSULE ORAL 2 TIMES DAILY WITH MEALS
Qty: 60 CAPSULE | Refills: 0 | Status: SHIPPED | OUTPATIENT
Start: 2019-11-07 | End: 2019-11-27 | Stop reason: SDUPTHER

## 2019-11-18 DIAGNOSIS — F40.10 SOCIAL ANXIETY DISORDER: Chronic | ICD-10-CM

## 2019-11-18 DIAGNOSIS — G40.209 PARTIAL SYMPTOMATIC EPILEPSY WITH COMPLEX PARTIAL SEIZURES, NOT INTRACTABLE, WITHOUT STATUS EPILEPTICUS (HCC): Chronic | ICD-10-CM

## 2019-11-19 RX ORDER — CLONAZEPAM 1 MG/1
1 TABLET ORAL 2 TIMES DAILY
Qty: 60 TABLET | Refills: 0 | Status: SHIPPED | OUTPATIENT
Start: 2019-11-19 | End: 2019-11-27 | Stop reason: SDUPTHER

## 2019-11-27 ENCOUNTER — OFFICE VISIT (OUTPATIENT)
Dept: FAMILY MEDICINE CLINIC | Age: 39
End: 2019-11-27
Payer: COMMERCIAL

## 2019-11-27 VITALS
SYSTOLIC BLOOD PRESSURE: 120 MMHG | HEIGHT: 70 IN | DIASTOLIC BLOOD PRESSURE: 70 MMHG | OXYGEN SATURATION: 96 % | WEIGHT: 195 LBS | BODY MASS INDEX: 27.92 KG/M2 | HEART RATE: 74 BPM | RESPIRATION RATE: 20 BRPM

## 2019-11-27 DIAGNOSIS — K21.9 GASTROESOPHAGEAL REFLUX DISEASE, ESOPHAGITIS PRESENCE NOT SPECIFIED: ICD-10-CM

## 2019-11-27 DIAGNOSIS — F32.5 MAJOR DEPRESSION IN COMPLETE REMISSION (HCC): Chronic | ICD-10-CM

## 2019-11-27 DIAGNOSIS — F40.10 SOCIAL ANXIETY DISORDER: Chronic | ICD-10-CM

## 2019-11-27 DIAGNOSIS — G25.89 EXTRAPYRAMIDAL MOVEMENT DISORDER, DRUG-INDUCED: ICD-10-CM

## 2019-11-27 DIAGNOSIS — G40.209 PARTIAL SYMPTOMATIC EPILEPSY WITH COMPLEX PARTIAL SEIZURES, NOT INTRACTABLE, WITHOUT STATUS EPILEPTICUS (HCC): Chronic | ICD-10-CM

## 2019-11-27 DIAGNOSIS — I10 ESSENTIAL HYPERTENSION: Chronic | ICD-10-CM

## 2019-11-27 DIAGNOSIS — T50.905A EXTRAPYRAMIDAL MOVEMENT DISORDER, DRUG-INDUCED: ICD-10-CM

## 2019-11-27 DIAGNOSIS — F31.78 BIPOLAR 1 DISORDER, MIXED, FULL REMISSION (HCC): ICD-10-CM

## 2019-11-27 PROCEDURE — 99214 OFFICE O/P EST MOD 30 MIN: CPT | Performed by: FAMILY MEDICINE

## 2019-11-27 PROCEDURE — 1036F TOBACCO NON-USER: CPT | Performed by: FAMILY MEDICINE

## 2019-11-27 PROCEDURE — G8419 CALC BMI OUT NRM PARAM NOF/U: HCPCS | Performed by: FAMILY MEDICINE

## 2019-11-27 PROCEDURE — G8484 FLU IMMUNIZE NO ADMIN: HCPCS | Performed by: FAMILY MEDICINE

## 2019-11-27 PROCEDURE — G8427 DOCREV CUR MEDS BY ELIG CLIN: HCPCS | Performed by: FAMILY MEDICINE

## 2019-11-27 RX ORDER — TRIHEXYPHENIDYL HYDROCHLORIDE 2 MG/1
2 TABLET ORAL 2 TIMES DAILY
Qty: 60 TABLET | Refills: 2 | Status: SHIPPED | OUTPATIENT
Start: 2019-11-27 | End: 2020-03-23 | Stop reason: SDUPTHER

## 2019-11-27 RX ORDER — OMEPRAZOLE 20 MG/1
20 CAPSULE, DELAYED RELEASE ORAL
Qty: 30 CAPSULE | Refills: 2 | Status: SHIPPED | OUTPATIENT
Start: 2019-11-27 | End: 2020-03-03

## 2019-11-27 RX ORDER — ZIPRASIDONE HYDROCHLORIDE 40 MG/1
40 CAPSULE ORAL DAILY
Qty: 30 CAPSULE | Refills: 2 | Status: SHIPPED | OUTPATIENT
Start: 2019-11-27 | End: 2020-03-23 | Stop reason: SDUPTHER

## 2019-11-27 RX ORDER — ZIPRASIDONE HYDROCHLORIDE 60 MG/1
60 CAPSULE ORAL 2 TIMES DAILY WITH MEALS
Qty: 60 CAPSULE | Refills: 2 | Status: SHIPPED | OUTPATIENT
Start: 2019-11-27 | End: 2020-03-23 | Stop reason: SDUPTHER

## 2019-11-27 RX ORDER — CLONAZEPAM 1 MG/1
1 TABLET ORAL 2 TIMES DAILY
Qty: 60 TABLET | Refills: 2 | Status: SHIPPED | OUTPATIENT
Start: 2019-11-27 | End: 2020-03-23 | Stop reason: SDUPTHER

## 2019-11-27 RX ORDER — NADOLOL 20 MG/1
TABLET ORAL
Qty: 45 TABLET | Refills: 1 | Status: SHIPPED | OUTPATIENT
Start: 2019-11-27 | End: 2020-03-23 | Stop reason: SDUPTHER

## 2019-11-27 ASSESSMENT — ENCOUNTER SYMPTOMS
SHORTNESS OF BREATH: 0
RHINORRHEA: 0
CHEST TIGHTNESS: 1
SORE THROAT: 0
SINUS PRESSURE: 1
COUGH: 1
SINUS PAIN: 0
CHOKING: 0
WHEEZING: 1

## 2019-12-03 ENCOUNTER — HOSPITAL ENCOUNTER (EMERGENCY)
Age: 39
Discharge: HOME | End: 2019-12-03
Attending: EMERGENCY MEDICINE
Payer: COMMERCIAL

## 2019-12-03 VITALS
HEIGHT: 69 IN | OXYGEN SATURATION: 99 % | WEIGHT: 185 LBS | DIASTOLIC BLOOD PRESSURE: 82 MMHG | BODY MASS INDEX: 27.4 KG/M2 | HEART RATE: 100 BPM | TEMPERATURE: 98.8 F | RESPIRATION RATE: 16 BRPM | SYSTOLIC BLOOD PRESSURE: 142 MMHG

## 2019-12-03 DIAGNOSIS — D72.829 LEUKOCYTOSIS, UNSPECIFIED TYPE: ICD-10-CM

## 2019-12-03 DIAGNOSIS — R11.2 NAUSEA VOMITING AND DIARRHEA: Primary | ICD-10-CM

## 2019-12-03 DIAGNOSIS — R03.0 ELEVATED BLOOD PRESSURE READING: ICD-10-CM

## 2019-12-03 DIAGNOSIS — R19.7 NAUSEA VOMITING AND DIARRHEA: Primary | ICD-10-CM

## 2019-12-03 LAB
A/G RATIO: 1 (ref 1.1–2.2)
ALBUMIN SERPL-MCNC: 4.8 G/DL (ref 3.4–5)
ALP BLD-CCNC: 76 U/L (ref 40–129)
ALT SERPL-CCNC: 22 U/L (ref 10–40)
ANION GAP SERPL CALCULATED.3IONS-SCNC: 15 MMOL/L (ref 3–16)
AST SERPL-CCNC: 25 U/L (ref 15–37)
ATYPICAL LYMPHOCYTE RELATIVE PERCENT: 4 % (ref 0–6)
BANDED NEUTROPHILS RELATIVE PERCENT: 1 % (ref 0–7)
BASOPHILS ABSOLUTE: 0 K/UL (ref 0–0.2)
BASOPHILS RELATIVE PERCENT: 0 %
BILIRUB SERPL-MCNC: 0.4 MG/DL (ref 0–1)
BUN BLDV-MCNC: 19 MG/DL (ref 7–20)
CALCIUM SERPL-MCNC: 10.5 MG/DL (ref 8.3–10.6)
CHLORIDE BLD-SCNC: 102 MMOL/L (ref 99–110)
CO2: 23 MMOL/L (ref 21–32)
CREAT SERPL-MCNC: 0.9 MG/DL (ref 0.9–1.3)
EOSINOPHILS ABSOLUTE: 0 K/UL (ref 0–0.6)
EOSINOPHILS RELATIVE PERCENT: 0 %
GFR AFRICAN AMERICAN: >60
GFR NON-AFRICAN AMERICAN: >60
GLOBULIN: 4.9 G/DL
GLUCOSE BLD-MCNC: 112 MG/DL (ref 70–99)
HCT VFR BLD CALC: 45.5 % (ref 40.5–52.5)
HEMOGLOBIN: 15.4 G/DL (ref 13.5–17.5)
LIPASE: 17 U/L (ref 13–60)
LYMPHOCYTES ABSOLUTE: 2.3 K/UL (ref 1–5.1)
LYMPHOCYTES RELATIVE PERCENT: 8 %
MCH RBC QN AUTO: 32.2 PG (ref 26–34)
MCHC RBC AUTO-ENTMCNC: 33.8 G/DL (ref 31–36)
MCV RBC AUTO: 95.2 FL (ref 80–100)
MONOCYTES ABSOLUTE: 0.9 K/UL (ref 0–1.3)
MONOCYTES RELATIVE PERCENT: 5 %
NEUTROPHILS ABSOLUTE: 15.6 K/UL (ref 1.7–7.7)
NEUTROPHILS RELATIVE PERCENT: 82 %
PDW BLD-RTO: 13.4 % (ref 12.4–15.4)
PLATELET # BLD: 468 K/UL (ref 135–450)
PLATELET SLIDE REVIEW: ABNORMAL
PMV BLD AUTO: 6.6 FL (ref 5–10.5)
POTASSIUM REFLEX MAGNESIUM: 4.5 MMOL/L (ref 3.5–5.1)
RBC # BLD: 4.78 M/UL (ref 4.2–5.9)
RBC # BLD: NORMAL 10*6/UL
SLIDE REVIEW: ABNORMAL
SODIUM BLD-SCNC: 140 MMOL/L (ref 136–145)
TOTAL PROTEIN: 9.7 G/DL (ref 6.4–8.2)
WBC # BLD: 18.8 K/UL (ref 4–11)

## 2019-12-03 PROCEDURE — 85025 COMPLETE CBC W/AUTO DIFF WBC: CPT

## 2019-12-03 PROCEDURE — 80053 COMPREHEN METABOLIC PANEL: CPT

## 2019-12-03 PROCEDURE — 96374 THER/PROPH/DIAG INJ IV PUSH: CPT

## 2019-12-03 PROCEDURE — 96361 HYDRATE IV INFUSION ADD-ON: CPT

## 2019-12-03 PROCEDURE — 6360000002 HC RX W HCPCS: Performed by: EMERGENCY MEDICINE

## 2019-12-03 PROCEDURE — 96375 TX/PRO/DX INJ NEW DRUG ADDON: CPT

## 2019-12-03 PROCEDURE — 2580000003 HC RX 258: Performed by: EMERGENCY MEDICINE

## 2019-12-03 PROCEDURE — 99283 EMERGENCY DEPT VISIT LOW MDM: CPT

## 2019-12-03 PROCEDURE — 2500000003 HC RX 250 WO HCPCS: Performed by: EMERGENCY MEDICINE

## 2019-12-03 PROCEDURE — 83690 ASSAY OF LIPASE: CPT

## 2019-12-03 RX ORDER — ONDANSETRON 4 MG/1
4 TABLET, ORALLY DISINTEGRATING ORAL EVERY 8 HOURS PRN
Qty: 20 TABLET | Refills: 0 | Status: SHIPPED | OUTPATIENT
Start: 2019-12-03 | End: 2019-12-10

## 2019-12-03 RX ORDER — 0.9 % SODIUM CHLORIDE 0.9 %
1000 INTRAVENOUS SOLUTION INTRAVENOUS ONCE
Status: COMPLETED | OUTPATIENT
Start: 2019-12-03 | End: 2019-12-03

## 2019-12-03 RX ORDER — ONDANSETRON 2 MG/ML
4 INJECTION INTRAMUSCULAR; INTRAVENOUS EVERY 30 MIN PRN
Status: DISCONTINUED | OUTPATIENT
Start: 2019-12-03 | End: 2019-12-03 | Stop reason: HOSPADM

## 2019-12-03 RX ADMIN — FAMOTIDINE 20 MG: 10 INJECTION, SOLUTION INTRAVENOUS at 02:37

## 2019-12-03 RX ADMIN — SODIUM CHLORIDE 1000 ML: 9 INJECTION, SOLUTION INTRAVENOUS at 02:37

## 2019-12-03 RX ADMIN — ONDANSETRON HYDROCHLORIDE 4 MG: 2 INJECTION, SOLUTION INTRAMUSCULAR; INTRAVENOUS at 02:37

## 2020-02-15 ENCOUNTER — APPOINTMENT (OUTPATIENT)
Dept: CT IMAGING | Age: 40
End: 2020-02-15
Payer: COMMERCIAL

## 2020-02-15 ENCOUNTER — HOSPITAL ENCOUNTER (EMERGENCY)
Age: 40
Discharge: HOME | End: 2020-02-15
Attending: EMERGENCY MEDICINE
Payer: COMMERCIAL

## 2020-02-15 ENCOUNTER — APPOINTMENT (OUTPATIENT)
Dept: GENERAL RADIOLOGY | Age: 40
End: 2020-02-15
Payer: COMMERCIAL

## 2020-02-15 VITALS
WEIGHT: 185 LBS | OXYGEN SATURATION: 96 % | RESPIRATION RATE: 14 BRPM | TEMPERATURE: 98.3 F | HEART RATE: 79 BPM | BODY MASS INDEX: 27.32 KG/M2 | SYSTOLIC BLOOD PRESSURE: 117 MMHG | DIASTOLIC BLOOD PRESSURE: 78 MMHG

## 2020-02-15 LAB
A/G RATIO: 1.5 (ref 1.1–2.2)
ALBUMIN SERPL-MCNC: 4.7 G/DL (ref 3.4–5)
ALP BLD-CCNC: 77 U/L (ref 40–129)
ALT SERPL-CCNC: 25 U/L (ref 10–40)
ANION GAP SERPL CALCULATED.3IONS-SCNC: 15 MMOL/L (ref 3–16)
AST SERPL-CCNC: 25 U/L (ref 15–37)
BASOPHILS ABSOLUTE: 0 K/UL (ref 0–0.2)
BASOPHILS RELATIVE PERCENT: 0.6 %
BILIRUB SERPL-MCNC: <0.2 MG/DL (ref 0–1)
BUN BLDV-MCNC: 19 MG/DL (ref 7–20)
CALCIUM SERPL-MCNC: 9.5 MG/DL (ref 8.3–10.6)
CHLORIDE BLD-SCNC: 99 MMOL/L (ref 99–110)
CO2: 24 MMOL/L (ref 21–32)
CREAT SERPL-MCNC: 0.7 MG/DL (ref 0.9–1.3)
EOSINOPHILS ABSOLUTE: 0.2 K/UL (ref 0–0.6)
EOSINOPHILS RELATIVE PERCENT: 2.1 %
GFR AFRICAN AMERICAN: >60
GFR NON-AFRICAN AMERICAN: >60
GLOBULIN: 3.1 G/DL
GLUCOSE BLD-MCNC: 108 MG/DL (ref 70–99)
HCT VFR BLD CALC: 43.9 % (ref 40.5–52.5)
HEMOGLOBIN: 15.1 G/DL (ref 13.5–17.5)
LYMPHOCYTES ABSOLUTE: 1.9 K/UL (ref 1–5.1)
LYMPHOCYTES RELATIVE PERCENT: 25.3 %
MCH RBC QN AUTO: 31.7 PG (ref 26–34)
MCHC RBC AUTO-ENTMCNC: 34.3 G/DL (ref 31–36)
MCV RBC AUTO: 92.4 FL (ref 80–100)
MONOCYTES ABSOLUTE: 0.7 K/UL (ref 0–1.3)
MONOCYTES RELATIVE PERCENT: 9.6 %
NEUTROPHILS ABSOLUTE: 4.8 K/UL (ref 1.7–7.7)
NEUTROPHILS RELATIVE PERCENT: 62.4 %
PDW BLD-RTO: 14.5 % (ref 12.4–15.4)
PLATELET # BLD: 301 K/UL (ref 135–450)
PMV BLD AUTO: 6.8 FL (ref 5–10.5)
POTASSIUM REFLEX MAGNESIUM: 4.7 MMOL/L (ref 3.5–5.1)
RBC # BLD: 4.76 M/UL (ref 4.2–5.9)
SODIUM BLD-SCNC: 138 MMOL/L (ref 136–145)
TOTAL PROTEIN: 7.8 G/DL (ref 6.4–8.2)
TROPONIN: <0.01 NG/ML
WBC # BLD: 7.7 K/UL (ref 4–11)

## 2020-02-15 PROCEDURE — 93005 ELECTROCARDIOGRAM TRACING: CPT | Performed by: EMERGENCY MEDICINE

## 2020-02-15 PROCEDURE — 85025 COMPLETE CBC W/AUTO DIFF WBC: CPT

## 2020-02-15 PROCEDURE — 71250 CT THORAX DX C-: CPT

## 2020-02-15 PROCEDURE — 6360000002 HC RX W HCPCS: Performed by: EMERGENCY MEDICINE

## 2020-02-15 PROCEDURE — 80053 COMPREHEN METABOLIC PANEL: CPT

## 2020-02-15 PROCEDURE — 99285 EMERGENCY DEPT VISIT HI MDM: CPT

## 2020-02-15 PROCEDURE — 96375 TX/PRO/DX INJ NEW DRUG ADDON: CPT

## 2020-02-15 PROCEDURE — 71045 X-RAY EXAM CHEST 1 VIEW: CPT

## 2020-02-15 PROCEDURE — 84484 ASSAY OF TROPONIN QUANT: CPT

## 2020-02-15 PROCEDURE — 96374 THER/PROPH/DIAG INJ IV PUSH: CPT

## 2020-02-15 RX ORDER — ACETAMINOPHEN 500 MG
1000 TABLET ORAL 3 TIMES DAILY PRN
Qty: 180 TABLET | Refills: 0 | Status: SHIPPED | OUTPATIENT
Start: 2020-02-15

## 2020-02-15 RX ORDER — LORAZEPAM 2 MG/ML
1 INJECTION INTRAMUSCULAR ONCE
Status: COMPLETED | OUTPATIENT
Start: 2020-02-15 | End: 2020-02-15

## 2020-02-15 RX ORDER — KETOROLAC TROMETHAMINE 30 MG/ML
30 INJECTION, SOLUTION INTRAMUSCULAR; INTRAVENOUS ONCE
Status: COMPLETED | OUTPATIENT
Start: 2020-02-15 | End: 2020-02-15

## 2020-02-15 RX ORDER — IBUPROFEN 800 MG/1
800 TABLET ORAL 4 TIMES DAILY PRN
Qty: 120 TABLET | Refills: 0 | Status: SHIPPED | OUTPATIENT
Start: 2020-02-15 | End: 2021-04-01

## 2020-02-15 RX ORDER — CYCLOBENZAPRINE HCL 10 MG
10 TABLET ORAL 3 TIMES DAILY PRN
Qty: 30 TABLET | Refills: 0 | Status: SHIPPED | OUTPATIENT
Start: 2020-02-15 | End: 2020-02-25

## 2020-02-15 RX ADMIN — LORAZEPAM 1 MG: 2 INJECTION, SOLUTION INTRAMUSCULAR; INTRAVENOUS at 13:20

## 2020-02-15 RX ADMIN — KETOROLAC TROMETHAMINE 30 MG: 30 INJECTION, SOLUTION INTRAMUSCULAR at 12:56

## 2020-02-15 ASSESSMENT — PAIN SCALES - GENERAL
PAINLEVEL_OUTOF10: 10
PAINLEVEL_OUTOF10: 0
PAINLEVEL_OUTOF10: 10

## 2020-02-15 ASSESSMENT — ENCOUNTER SYMPTOMS
BACK PAIN: 0
VOMITING: 0
DIARRHEA: 0
NAUSEA: 0
SHORTNESS OF BREATH: 1
COUGH: 0
WHEEZING: 0

## 2020-02-15 ASSESSMENT — PAIN DESCRIPTION - ORIENTATION: ORIENTATION: RIGHT

## 2020-02-15 ASSESSMENT — PAIN DESCRIPTION - LOCATION: LOCATION: RIB CAGE

## 2020-02-15 ASSESSMENT — PAIN DESCRIPTION - PAIN TYPE: TYPE: ACUTE PAIN

## 2020-02-15 NOTE — ED PROVIDER NOTES
(03/23/2018), Essential hypertension (1/1/2006), GERD (gastroesophageal reflux disease), Head injury due to trauma, Major depression in complete remission ([facility] (1/1/2006), [facility] (1/1/2003), Partial complex seizure disorder without intractable epilepsy ([facility] (1/1/2006), Psychiatric diagnosis, and Social anxiety disorder (1/1/2006). Past surgical history:  has a past surgical history that includes Elbow Debridement (Right); Upper gastrointestinal endoscopy (12/16/2010); knee surgery (Right); Appendectomy (5/22/2012); [place] tooth extraction; and Upper gastrointestinal endoscopy (03/23/2018). Home medications:   Prior to Admission medications    Medication Sig Start Date End Date Taking? Authorizing Provider   ibuprofen (ADVIL;MOTRIN) 800 MG tablet Take 1 tablet by mouth 4 times daily as needed for Pain 2/15/20  Yes José Miguel Terrell, DO   cyclobenzaprine (FLEXERIL) 10 MG tablet Take 1 tablet by mouth 3 times daily as needed for Muscle spasms 2/15/20 2/25/20 Yes José Miguel Terrell, DO   clonazePAM (KLONOPIN) 1 MG tablet Take 1 tablet by mouth 2 times daily for 90 days. 11/27/19 2/25/20  Libby Sibley DO   ziprasidone (GEODON) 40 MG capsule Take 1 capsule by mouth daily Take with 60 mg tab at dinner  Patient taking differently: Take 40 mg by mouth daily Take with 60 mg tab in morning 11/27/19   Libby Sibley DO   ziprasidone (GEODON) 60 MG capsule Take 1 capsule by mouth 2 times daily (with meals) Take a 60 mg with a 40 mg at dinner 11/27/19   Libby Sibley DO   trihexyphenidyl (ARTANE) 2 MG tablet Take 1 tablet by mouth 2 times daily 11/27/19   Libby Sibley DO   nadolol (CORGARD) 20 MG tablet 1/2 tab a daily by mouth For high blood pressure.  11/27/19   Libby Sibley DO   omeprazole (PRILOSEC) 20 MG delayed release capsule Take 1 capsule by mouth every morning (before breakfast) 11/27/19   Libby Sibley DO   amLODIPine (NORVASC) 2.5 MG tablet TAKE 1 TABLET BY MOUTH DAILY FOR HIGH BLOOD PRESSURE 8/9/19   Donna Rizo 02/15/20 1312  LORazepam (ATIVAN) injection 1 mg  ONCE      Last MAR action:  Given - by Phu Nearing on 02/15/20 at 1320 ANAHY GRANDE    02/15/20 1300 02/15/20 1254  ketorolac (TORADOL) injection 30 mg  ONCE      Last MAR action:  Given - by Phu Nearing on 02/15/20 at 1256 [address] LARSON          EKG  The Ekg interpreted by me in the absence of a cardiologist shows. normal sinus rhythm with a rate of 77  Axis is   Normal  QTc is  normal  Intervals and Durations are unremarkable. No specific ST-T wave changes appreciated. No evidence of acute ischemia. No previous EKG. Radiology  Ct Chest Wo Contrast    Result Date: 2/15/2020  EXAMINATION: CT OF THE CHEST WITHOUT CONTRAST 2/15/2020 1:48 pm TECHNIQUE: CT of the chest was performed without the administration of intravenous contrast. Multiplanar reformatted images are provided for review. Dose modulation, iterative reconstruction, and/or weight based adjustment of the mA/kV was utilized to reduce the radiation dose to as low as reasonably achievable. COMPARISON: Chest radiograph 02/15/2020, chest CT 03/25/2018 HISTORY: ORDERING SYSTEM PROVIDED HISTORY: chest pain out of proportion on examination TECHNOLOGIST PROVIDED HISTORY: Reason for exam:->chest pain out of proportion on examination Reason for Exam: pt c/o right sided chest pain Relevant Medical/Surgical History: pt heard a pop in his chest FINDINGS: Lack of intravenous contrast administration, partially limiting evaluation of the soft tissues and vasculature. MEDIASTINUM:  Normal heart size. No pericardial effusion. Minimal atherosclerotic calcification in the proximal left anterior descending coronary artery. Normal variant common origin of the brachiocephalic and left common carotid arteries. No mediastinal nor obvious hilar lymphadenopathy. Remnant thymus. LUNGS/PLEURA:  Patent central airways. Mild bronchial wall thickening.  Unchanged thin walled cystic lesion in the subpleural superior segment of the right lower lobe, likely a pneumatocele related to prior infection trauma. Calcified granuloma in the right lower lobe. Minimal gravity dependent atelectasis in the right lower lobe. Clear left lung. No pleural effusions nor pneumothoraces. UPPER ABDOMEN:  Normal appearance of the included upper abdominal organs. SOFT TISSUES/BONES:   No supraclavicular nor axillary lymphadenopathy. No acute fracture. Healed fracture of the posterolateral right 7th rib. Joints maintain anatomic alignment. 1. No acute injury of the chest. 2. Mild bronchial wall thickening potentially due to bronchitis or reactive airways disease. Xr Chest Portable    Result Date: 2/15/2020  EXAMINATION: ONE XRAY VIEW OF THE CHEST 2/15/2020 1:02 pm COMPARISON: 03/25/2018 HISTORY: ORDERING SYSTEM PROVIDED HISTORY: felt pop and significant right CP TECHNOLOGIST PROVIDED HISTORY: Reason for exam:->felt pop and significant right CP Reason for Exam: pt was smoking a cigarette and  felt popped in rt side of chest -now having severe rt chest pain -- Acuity: Acute Type of Exam: Initial Relevant Medical/Surgical History: see epic FINDINGS: Single portable frontal view of the chest is submitted for review. The cardiac silhouette is normal in size. Lung parenchyma is clear without focal airspace consolidation, sizeable pleural effusion, or pneumothorax. Trachea is midline. Visualized osseous structures and soft tissues are grossly intact. No acute cardiopulmonary pathology.          Labs  Results for orders placed or performed during the hospital encounter of 02/15/20   CBC Auto Differential   Result Value Ref Range    WBC 7.7 4.0 - 11.0 K/uL    RBC 4.76 4.20 - 5.90 M/uL    Hemoglobin 15.1 13.5 - 17.5 g/dL    Hematocrit 43.9 40.5 - 52.5 %    MCV 92.4 80.0 - 100.0 fL    MCH 31.7 26.0 - 34.0 pg    MCHC 34.3 31.0 - 36.0 g/dL    RDW 14.5 12.4 - 15.4 %    Platelets 080 090 - 486 K/uL    MPV 6.8 5.0 - 10.5 fL    Neutrophils the following medications. I counseled patient how to take these medications. New Prescriptions    CYCLOBENZAPRINE (FLEXERIL) 10 MG TABLET    Take 1 tablet by mouth 3 times daily as needed for Muscle spasms    IBUPROFEN (ADVIL;MOTRIN) 800 MG TABLET    Take 1 tablet by mouth 4 times daily as needed for Pain       Disposition referral (if applicable):  Ra Marino, [address] [place], [place] [id number]  [phone]    In 1 week          Total critical care time is 120 minutes, which excludes separately billable procedures and updating family. Time spent is specifically for management of the presenting complaint and symptoms initially, direct bedside care, reevaluation, review of records, and consultation. There was a high probability of clinically significant life-threatening deterioration in the patient's condition, which required my urgent intervention. This chart was generated in part by using Dragon Dictation system and may contain errors related to that system including errors in grammar, punctuation, and spelling, as well as words and phrases that may be inappropriate. If there are any questions or concerns please feel free to contact the dictating provider for clarification.      Omkar Chapa DO - PGY-1  [facility]  (Available via perfect serve.)       Omkar Chapa,   Resident  02/15/20 [address]87 [facility]  Resident  02/15/20 [id number]

## 2020-02-16 LAB
EKG ATRIAL RATE: 77 BPM
EKG DIAGNOSIS: NORMAL
EKG P AXIS: 56 DEGREES
EKG P-R INTERVAL: 196 MS
EKG Q-T INTERVAL: 362 MS
EKG QRS DURATION: 86 MS
EKG QTC CALCULATION (BAZETT): 409 MS
EKG R AXIS: 44 DEGREES
EKG T AXIS: 15 DEGREES
EKG VENTRICULAR RATE: 77 BPM

## 2020-02-16 PROCEDURE — 93010 ELECTROCARDIOGRAM REPORT: CPT | Performed by: INTERNAL MEDICINE

## 2020-03-03 RX ORDER — OMEPRAZOLE 20 MG/1
20 CAPSULE, DELAYED RELEASE ORAL
Qty: 30 CAPSULE | Refills: 0 | Status: SHIPPED | OUTPATIENT
Start: 2020-03-03 | End: 2020-03-23 | Stop reason: SDUPTHER

## 2020-03-03 RX ORDER — ATORVASTATIN CALCIUM 10 MG/1
10 TABLET, FILM COATED ORAL DAILY
Qty: 30 TABLET | Refills: 0 | Status: SHIPPED | OUTPATIENT
Start: 2020-03-03 | End: 2020-03-23 | Stop reason: SDUPTHER

## 2020-03-03 RX ORDER — AMLODIPINE BESYLATE 2.5 MG/1
TABLET ORAL
Qty: 30 TABLET | Refills: 0 | Status: SHIPPED | OUTPATIENT
Start: 2020-03-03 | End: 2020-03-23 | Stop reason: SDUPTHER

## 2020-03-22 NOTE — PROGRESS NOTES
Right lower leg: No edema. Left lower leg: No edema. Lymphadenopathy:      Head:      Right side of head: No submandibular or tonsillar adenopathy. Left side of head: No submandibular or tonsillar adenopathy. Cervical: No cervical adenopathy. Right cervical: No superficial, deep or posterior cervical adenopathy. Left cervical: No superficial, deep or posterior cervical adenopathy. Skin:     General: Skin is warm and dry. Coloration: Skin is not pale. Neurological:      Mental Status: He is alert. Deep Tendon Reflexes:      Reflex Scores:       Patellar reflexes are 2+ on the right side and 2+ on the left side. Psychiatric:         Attention and Perception: Attention and perception normal.         Mood and Affect: Mood is anxious and depressed. Speech: Speech is rapid and pressured. Behavior: Behavior normal. Behavior is cooperative. Cognition and Memory: Cognition and memory normal.         Judgment: Judgment normal.       Vitals:    03/23/20 0819   BP: 112/72   Site: Right Upper Arm   Position: Sitting   Cuff Size: Medium Adult   Pulse: 105   Resp: 20   SpO2: 95%   Weight: 212 lb (96.2 kg)  Comment: shoes on   Height: 5' 9\" (1.753 m)     BP Readings from Last 3 Encounters:   03/23/20 112/72   02/15/20 117/78   12/03/19 (!) 142/82     Pulse Readings from Last 3 Encounters:   03/23/20 105   02/15/20 79   12/03/19 100     Wt Readings from Last 3 Encounters:   03/23/20 212 lb (96.2 kg)   02/15/20 185 lb (83.9 kg)   12/03/19 185 lb (83.9 kg)     Body mass index is 31.31 kg/m².      12/3/2019 02:41 2/15/2020 12:57   Sodium 140 138   Potassium 4.5 4.7   Chloride 102 99   CO2 23 24   BUN 19 19   Creatinine 0.9 0.7 (L)   Anion Gap 15 15   GFR Non-African American >60 >60   Glucose 112 (H) 108 (H)   Calcium 10.5 9.5   Total Protein 9.7 (H) 7.8   Troponin  <0.01   Albumin 4.8 4.7   Globulin 4.9 3.1   Albumin/Globulin Ratio 1.0 (L) 1.5   Alk Phos 76 77   ALT 22 25 AST 25 25   Bilirubin 0.4 <0.2   Lipase 17.0    WBC 18.8 (H) 7.7   RBC 4.78 4.76   Hemoglobin Quant 15.4 15.1   Hematocrit 45.5 43.9   MCV 95.2 92.4   MCH 32.2 31.7   MCHC 33.8 34.3   MPV 6.6 6.8   RDW 13.4 14.5   Platelet Count 189 (H) 301   Neutrophils % 82.0 62.4   Lymphocyte % 8.0 25.3   Monocytes % 5.0 9.6   Eosinophils % 0.0 2.1   Basophils % 0.0 0.6   Neutrophils Absolute 15.6 (H) 4.8   Lymphocytes Absolute 2.3 1.9   Monocytes Absolute 0.9 0.7   Eosinophils Absolute 0.0 0.2   Basophils Absolute 0.0 0.0   Bands Relative 1    Atypical Lymphocytes Relative 4    RBC Morphology Normal    PLATELET SLIDE REVIEW Increased      ONE XRAY VIEW OF THE CHEST  2/15/2020 1:02 pm     COMPARISON:  03/25/2018     HISTORY:  ORDERING SYSTEM PROVIDED HISTORY: felt pop and significant right CP  TECHNOLOGIST PROVIDED HISTORY:  Reason for exam:->felt pop and significant right CP  Reason for Exam: pt was smoking a cigarette and  felt popped in rt side of  chest -now having severe rt chest pain --  Acuity: Acute  Type of Exam: Initial  Relevant Medical/Surgical History: see epic     FINDINGS:  Single portable frontal view of the chest is submitted for review. The  cardiac silhouette is normal in size. Lung parenchyma is clear without focal  airspace consolidation, sizeable pleural effusion, or pneumothorax. Trachea  is midline. Visualized osseous structures and soft tissues are grossly  intact.     IMPRESSION:  No acute cardiopulmonary pathology.     CT OF THE CHEST WITHOUT CONTRAST  2/15/2020 1:48 pm  COMPARISON:  Chest radiograph 02/15/2020, chest CT 03/25/2018     HISTORY:  ORDERING SYSTEM PROVIDED HISTORY: chest pain out of proportion on examination  TECHNOLOGIST PROVIDED HISTORY:  Reason for exam:->chest pain out of proportion on examination  Reason for Exam: pt c/o right sided chest pain  Relevant Medical/Surgical History: pt heard a pop in his chest     FINDINGS:  Lack of intravenous contrast administration, partially acute  Resolved. Essential hypertension  -     amLODIPine (NORVASC) 2.5 MG tablet; TAKE 1 TABLET DAILY  -     nadolol (CORGARD) 20 MG tablet; 1/2 tab a daily by mouth For high blood pressure.  -     Good control.  -     Continue meds and lifestyle control. Partial symptomatic epilepsy with complex partial seizures, not intractable, without status epilepticus (HCC)  -     clonazePAM (KLONOPIN) 1 MG tablet; Take 1 tablet by mouth 2 times daily for 90 days. -     clonazePAM (KLONOPIN) 2 MG tablet; Take 1 tablet by mouth 2 times daily as needed (SEIZURES) for up to 196 days. TAKE TO ABORT SEIZURES  -     Good control.  -     Continue meds and lifestyle control. Social anxiety disorder  -     clonazePAM (KLONOPIN) 1 MG tablet; Take 1 tablet by mouth 2 times daily for 90 days.  -     Not well controlled. Make sure he schedules his visits so he does not run out of medication.  -     Continue meds and lifestyle control. Bipolar 1 disorder, mixed, full remission (HCC)  -     ziprasidone (GEODON) 40 MG capsule; Take 1 capsule by mouth daily Take with 60 mg tab at dinner  -     ziprasidone (GEODON) 60 MG capsule; Take 1 capsule by mouth 2 times daily (with meals) Take a 60 mg with a 40 mg at dinner  -     lamoTRIgine (LAMICTAL) 200 MG tablet; TAKE 2 TABLETS 1 TIME DAILY AT [facility]  -     Not controlled. -     Continue meds and lifestyle control. Schedule next visit now. Major depression in complete remission (HCC)  -     ziprasidone (GEODON) 40 MG capsule; Take 1 capsule by mouth daily Take with 60 mg tab at dinner  -     ziprasidone (GEODON) 60 MG capsule; Take 1 capsule by mouth 2 times daily (with meals) Take a 60 mg with a 40 mg at dinner  -     Not controlled. -     Continue meds and lifestyle control. Gastroesophageal reflux disease, esophagitis presence not specified  -     omeprazole (PRILOSEC) 20 MG delayed release capsule;  Take 1 capsule by mouth every morning (before breakfast)  - Good control.  -     Continue meds and lifestyle control. Hypercholesterolemia  -     atorvastatin (LIPITOR) 10 MG tablet; Take 1 tablet by mouth daily  Fasting labs prior to next visit. Extrapyramidal movement disorder, drug-induced  -     trihexyphenidyl (ARTANE) 2 MG tablet; Take 1 tablet by mouth 2 times daily  -     Controlled- only mild tremor.  -     Continue meds and lifestyle control. Psychophysiological insomnia  -     Good control.  -     Continue meds and lifestyle control. Alcohol excess  Resolved per patient.   Patient having trouble living  from the influences of his parents, dealing with the pressures of living with someone else and her children, dealing with the pressures of fear of the COVID-19 virus      Mary Comp, DO

## 2020-03-23 ENCOUNTER — OFFICE VISIT (OUTPATIENT)
Dept: FAMILY MEDICINE CLINIC | Age: 40
End: 2020-03-23
Payer: COMMERCIAL

## 2020-03-23 VITALS
OXYGEN SATURATION: 95 % | SYSTOLIC BLOOD PRESSURE: 112 MMHG | BODY MASS INDEX: 31.4 KG/M2 | DIASTOLIC BLOOD PRESSURE: 72 MMHG | WEIGHT: 212 LBS | HEIGHT: 69 IN | HEART RATE: 105 BPM | RESPIRATION RATE: 20 BRPM

## 2020-03-23 PROCEDURE — 99214 OFFICE O/P EST MOD 30 MIN: CPT | Performed by: FAMILY MEDICINE

## 2020-03-23 PROCEDURE — G8417 CALC BMI ABV UP PARAM F/U: HCPCS | Performed by: FAMILY MEDICINE

## 2020-03-23 PROCEDURE — 1036F TOBACCO NON-USER: CPT | Performed by: FAMILY MEDICINE

## 2020-03-23 PROCEDURE — G8427 DOCREV CUR MEDS BY ELIG CLIN: HCPCS | Performed by: FAMILY MEDICINE

## 2020-03-23 PROCEDURE — G8484 FLU IMMUNIZE NO ADMIN: HCPCS | Performed by: FAMILY MEDICINE

## 2020-03-23 RX ORDER — NADOLOL 20 MG/1
TABLET ORAL
Qty: 45 TABLET | Refills: 1 | Status: SHIPPED | OUTPATIENT
Start: 2020-03-23 | End: 2020-06-30 | Stop reason: SDUPTHER

## 2020-03-23 RX ORDER — ATORVASTATIN CALCIUM 10 MG/1
10 TABLET, FILM COATED ORAL DAILY
Qty: 30 TABLET | Refills: 10 | Status: SHIPPED | OUTPATIENT
Start: 2020-03-23 | End: 2021-04-21

## 2020-03-23 RX ORDER — ZIPRASIDONE HYDROCHLORIDE 40 MG/1
40 CAPSULE ORAL DAILY
Qty: 30 CAPSULE | Refills: 2 | Status: SHIPPED | OUTPATIENT
Start: 2020-03-23 | End: 2020-06-30 | Stop reason: SDUPTHER

## 2020-03-23 RX ORDER — LAMOTRIGINE 200 MG/1
TABLET ORAL
Qty: 60 TABLET | Refills: 5 | Status: SHIPPED | OUTPATIENT
Start: 2020-03-23 | End: 2020-06-30 | Stop reason: SDUPTHER

## 2020-03-23 RX ORDER — TRIHEXYPHENIDYL HYDROCHLORIDE 2 MG/1
2 TABLET ORAL 2 TIMES DAILY
Qty: 60 TABLET | Refills: 2 | Status: SHIPPED | OUTPATIENT
Start: 2020-03-23 | End: 2020-06-30 | Stop reason: SDUPTHER

## 2020-03-23 RX ORDER — OMEPRAZOLE 20 MG/1
20 CAPSULE, DELAYED RELEASE ORAL
Qty: 30 CAPSULE | Refills: 2 | Status: SHIPPED | OUTPATIENT
Start: 2020-03-23 | End: 2020-06-30 | Stop reason: SDUPTHER

## 2020-03-23 RX ORDER — AMLODIPINE BESYLATE 2.5 MG/1
TABLET ORAL
Qty: 30 TABLET | Refills: 2 | Status: SHIPPED | OUTPATIENT
Start: 2020-03-23 | End: 2020-09-09

## 2020-03-23 RX ORDER — TRAZODONE HYDROCHLORIDE 100 MG/1
50-100 TABLET ORAL NIGHTLY
Qty: 30 TABLET | Refills: 5 | Status: CANCELLED | OUTPATIENT
Start: 2020-03-23

## 2020-03-23 RX ORDER — CLONAZEPAM 1 MG/1
1 TABLET ORAL 2 TIMES DAILY
Qty: 60 TABLET | Refills: 2 | Status: SHIPPED | OUTPATIENT
Start: 2020-03-23 | End: 2020-06-30 | Stop reason: SDUPTHER

## 2020-03-23 RX ORDER — CLONAZEPAM 2 MG/1
2 TABLET ORAL 2 TIMES DAILY PRN
Qty: 60 TABLET | Refills: 2 | Status: SHIPPED | OUTPATIENT
Start: 2020-03-23 | End: 2020-09-25 | Stop reason: SDUPTHER

## 2020-03-23 RX ORDER — ZIPRASIDONE HYDROCHLORIDE 60 MG/1
60 CAPSULE ORAL 2 TIMES DAILY WITH MEALS
Qty: 60 CAPSULE | Refills: 2 | Status: SHIPPED | OUTPATIENT
Start: 2020-03-23 | End: 2020-06-30 | Stop reason: SDUPTHER

## 2020-03-23 ASSESSMENT — PATIENT HEALTH QUESTIONNAIRE - PHQ9
2. FEELING DOWN, DEPRESSED OR HOPELESS: 1
SUM OF ALL RESPONSES TO PHQ9 QUESTIONS 1 & 2: 2
SUM OF ALL RESPONSES TO PHQ QUESTIONS 1-9: 2
1. LITTLE INTEREST OR PLEASURE IN DOING THINGS: 1
SUM OF ALL RESPONSES TO PHQ QUESTIONS 1-9: 2

## 2020-03-23 ASSESSMENT — ENCOUNTER SYMPTOMS
CHEST TIGHTNESS: 0
SHORTNESS OF BREATH: 0

## 2020-05-26 RX ORDER — GABAPENTIN 400 MG/1
CAPSULE ORAL
Qty: 120 CAPSULE | Refills: 0 | Status: SHIPPED | OUTPATIENT
Start: 2020-05-26 | End: 2020-08-07

## 2020-06-30 ENCOUNTER — VIRTUAL VISIT (OUTPATIENT)
Dept: FAMILY MEDICINE CLINIC | Age: 40
End: 2020-06-30
Payer: COMMERCIAL

## 2020-06-30 PROCEDURE — 99214 OFFICE O/P EST MOD 30 MIN: CPT | Performed by: FAMILY MEDICINE

## 2020-06-30 PROCEDURE — 4004F PT TOBACCO SCREEN RCVD TLK: CPT | Performed by: FAMILY MEDICINE

## 2020-06-30 PROCEDURE — G8417 CALC BMI ABV UP PARAM F/U: HCPCS | Performed by: FAMILY MEDICINE

## 2020-06-30 PROCEDURE — G8427 DOCREV CUR MEDS BY ELIG CLIN: HCPCS | Performed by: FAMILY MEDICINE

## 2020-06-30 RX ORDER — CLONAZEPAM 1 MG/1
1 TABLET ORAL 2 TIMES DAILY
Qty: 60 TABLET | Refills: 2 | Status: SHIPPED | OUTPATIENT
Start: 2020-06-30 | End: 2020-09-25 | Stop reason: SDUPTHER

## 2020-06-30 RX ORDER — OMEPRAZOLE 20 MG/1
20 CAPSULE, DELAYED RELEASE ORAL
Qty: 30 CAPSULE | Refills: 2 | Status: SHIPPED | OUTPATIENT
Start: 2020-06-30 | End: 2020-09-25 | Stop reason: SDUPTHER

## 2020-06-30 RX ORDER — NADOLOL 20 MG/1
TABLET ORAL
Qty: 45 TABLET | Refills: 1 | Status: SHIPPED | OUTPATIENT
Start: 2020-06-30 | End: 2021-01-08 | Stop reason: SDUPTHER

## 2020-06-30 RX ORDER — ZIPRASIDONE HYDROCHLORIDE 40 MG/1
40 CAPSULE ORAL DAILY
Qty: 30 CAPSULE | Refills: 2 | Status: SHIPPED | OUTPATIENT
Start: 2020-06-30 | End: 2020-09-25 | Stop reason: SDUPTHER

## 2020-06-30 RX ORDER — ZIPRASIDONE HYDROCHLORIDE 60 MG/1
60 CAPSULE ORAL 2 TIMES DAILY WITH MEALS
Qty: 60 CAPSULE | Refills: 2 | Status: SHIPPED | OUTPATIENT
Start: 2020-06-30 | End: 2020-09-25 | Stop reason: SDUPTHER

## 2020-06-30 RX ORDER — LAMOTRIGINE 200 MG/1
TABLET ORAL
Qty: 60 TABLET | Refills: 5 | Status: SHIPPED | OUTPATIENT
Start: 2020-06-30 | End: 2021-01-08 | Stop reason: SDUPTHER

## 2020-06-30 RX ORDER — TRIHEXYPHENIDYL HYDROCHLORIDE 2 MG/1
2 TABLET ORAL 2 TIMES DAILY
Qty: 60 TABLET | Refills: 2 | Status: SHIPPED | OUTPATIENT
Start: 2020-06-30 | End: 2020-09-25 | Stop reason: SDUPTHER

## 2020-06-30 NOTE — PROGRESS NOTES
2020    TELEHEALTH EVALUATION -- Audio/Visual (During YSGVD-79 public health emergency)  Chief Complaint   Patient presents with    Anxiety     FOLLOW UP ON ANXIETY AND NEEDS REFILLS ON MEDS   313  HPI:    Salome Beth (:  1980) has requested an audio/video evaluation for the following concern(s):    Essential hypertension  Has no cuff. He thinks BP up with weight gain. He has fam hx of DM. Is not adding any salt. No salt substitute. Gastroesophageal reflux disease, esophagitis presence not specified  Has a cough. If misses one dose in the evening. Bipolar 1 disorder, mixed, full remission ([address]  He is doing better since he quit drinking. He feels better since reopening. Major depression in complete remission ([address]  He does not feel depressed now yesterday was bad day. No side effects with blood pressure medicine. Social anxiety disorder  He is taking Clonazepam 1 mg bid. Took 2 mg more when he saw empty shelves at the grocery. Extrapyramidal movement disorder, drug-induced  Has more shakes with Clonazepam.    Partial symptomatic epilepsy with complex partial seizures, not intractable, without status epilepticus ([address]  Last sz was right after last seen. Total of 3. Vitamin D deficiency  Is still taking. RUDDY with CPAP  Discussed CPAP machine. COVID-19  He is wearing a mask at [facility] AT [facility] ([facility]). No hand shaking.      Past Medical History:   Diagnosis Date    Abscess of elbow     right    Alcoholism /alcohol abuse ([address] 2005    Stopped 2014    Arthritis     Druham's esophagus     Bipolar 1 disorder, mixed, full remission ([address] 2006    Contusion of wrist, right 2016    Dislocation of part of left shoulder girdle 1985    arm pulled out of socket - no medical visit at the time    Erosive gastropathy 2018    antrum    Essential hypertension 2006    lost 100 lbs in 1 year    GERD (gastroesophageal reflux disease)     Head injury due to trauma Frontal lobe trauma causing seizures and shakes. No workup till after seizures/shakes appeared    Major depression in complete remission ([facility] 2006    while on medicine    Osteomyelitis ([facility] 2003    right tibia    Partial complex seizure disorder without intractable epilepsy ([facility] 2006    fist clenching is a typical pre-seizure activity for him    Psychiatric diagnosis     Saw Dr. Shlomo Leventhal  [phone]    Social anxiety disorder 2006    moderate       Past Surgical History:   Procedure Laterality Date    APPENDECTOMY  2012    lap appy    ELBOW DEBRIDEMENT Right     MRSA    KNEE SURGERY Right     debridement osteomyelitis    UPPER GASTROINTESTINAL ENDOSCOPY  2010    UPPER GASTROINTESTINAL ENDOSCOPY  2018    gastric and esophageal bx, erosive gastropathy    WISDOM TOOTH EXTRACTION         Social History     Socioeconomic History    Marital status: Single     Spouse name: Not on file    Number of children: 0    Years of education: 15    Highest education level: Not on file   Occupational History    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  Have almost quit (nicorette) 3/13/17. Restarted 18 < 1 PPD. .19   Substance and Sexual Activity    Alcohol use: Yes     Alcohol/week: 0.0 standard drinks     Comment: Occasional 3-4 beers. Has not drank at all for 4 weeks. doesn't want to lose GF.     Drug use: Yes     Types: Marijuana    Sexual activity: Never Lifestyle    Physical activity     Days per week: Not on file     Minutes per session: Not on file    Stress: Not on file   Relationships    Social connections     Talks on phone: Not on file     Gets together: Not on file     Attends Christian service: Not on file     Active member of club or organization: Not on file     Attends meetings of clubs or organizations: Not on file     Relationship status: Not on file    Intimate partner violence     Fear of current or ex partner: Not on file     Emotionally abused: Not on file     Physically abused: Not on file     Forced sexual activity: Not on file   Other Topics Concern    Not on file   Social History Narrative    Only exercise is work. 6/8/17. 9/18/17. 4/12/18. 7/23/18. 10/22/18. 1/21/19. 5/6/19. 8/9/19.11/27/19. less time shoeing horse.         Family History   Problem Relation Age of Onset    Diabetes Mother    [facility] Rheum Arthritis Mother     Depression Mother     Hypertension Mother     High Cholesterol Mother     Osteoarthritis Mother     Obesity Mother     Other Mother         varicose veins    Diabetes Father     High Blood Pressure Father     High Cholesterol Father     Other Father         interstitial cystitis, DDD C spine    Heart Disease Father         TACHYCARDIA    Mental Illness Brother         schizophrenia - dissociated    Obesity Brother     Diabetes Brother     Liver Disease Brother         fatty liver    Obesity Brother     Other Brother         ervin    Stroke Maternal Grandmother     Diabetes Maternal Grandfather     Coronary Art Dis Maternal Grandfather     Heart Surgery Maternal Grandfather 54        CABG    Other Maternal Grandfather         PAD with amputations    Diabetes Paternal Grandmother     Other Brother         tonsilitis, gall bladder,       Allergies   Allergen Reactions    Banana Hives and Itching    Ancef [Cefazolin Sodium] Rash    Iodine Nausea And Vomiting     WITH IODINATED CONTRAST    Shellfish-Derived Products Nausea And Vomiting     Review of Systems    Prior to Visit Medications    Medication Sig Taking? Authorizing Provider   atorvastatin (LIPITOR) 10 MG tablet Take 1 tablet by mouth daily Yes Retia Shillings, DO   amLODIPine (NORVASC) 2.5 MG tablet TAKE 1 TABLET DAILY Yes Retia Shillings, DO   omeprazole (PRILOSEC) 20 MG delayed release capsule Take 1 capsule by mouth every morning (before breakfast) Yes Retia Shillings, DO   ziprasidone (GEODON) 40 MG capsule Take 1 capsule by mouth daily Take with 60 mg tab at dinner Yes Retia Shillings, DO   ziprasidone (GEODON) 60 MG capsule Take 1 capsule by mouth 2 times daily (with meals) Take a 60 mg with a 40 mg at dinner Yes Retia Shillings, DO   trihexyphenidyl (ARTANE) 2 MG tablet Take 1 tablet by mouth 2 times daily Yes Retia Shillings, DO   nadolol (CORGARD) 20 MG tablet 1/2 tab a daily by mouth For high blood pressure. Yes Retia Shillings, DO   lamoTRIgine (LAMICTAL) 200 MG tablet TAKE 2 TABLETS 1 TIME DAILY AT DINNER Yes Retia Shillings, DO   clonazePAM (KLONOPIN) 1 MG tablet Take 1 tablet by mouth 2 times daily for 90 days. Yes Retia Shillings, DO   clonazePAM (KLONOPIN) 2 MG tablet Take 1 tablet by mouth 2 times daily as needed (SEIZURES) for up to 196 days.  TAKE TO ABORT SEIZURES Yes Retia Shillings, DO   ibuprofen (ADVIL;MOTRIN) 800 MG tablet Take 1 tablet by mouth 4 times daily as needed for Pain Yes Naila Oats, DO   acetaminophen (TYLENOL) 500 MG tablet Take 2 tablets by mouth 3 times daily as needed for Pain Yes Naila Oats, DO   Cholecalciferol (VITAMIN D3) 5000 units CAPS Take 1 capsule by mouth daily (with breakfast) Yes Retia Shillings, DO   traZODone (DESYREL) 100 MG tablet Take 0.5-1 tablets by mouth nightly  Patient taking differently: Take 25-50 mg by mouth nightly Indications: Trouble Sleeping, Major Depressive Disorder  Yes Retia Shillings, DO   GLUCOSAMINE-CHONDROITIN ER PO Take by mouth 2 times daily Yes Historical Provider, MD   guaiFENesin (MUCINEX) 600 MG extended release tablet Take 600 mg by mouth daily  Yes Historical Provider, MD   Melatonin 5 MG TABS tablet Take 1 tablet by mouth nightly Yes Historical Provider, MD   fexofenadine (ALLEGRA) 180 MG tablet Take 1 tablet by mouth daily Yes Bk Padilla DO   folic acid (FOLVITE) 010 MCG tablet Take 1 tablet by mouth daily Yes Bk Padilla DO   therapeutic multivitamin-minerals [facility]) tablet Take 1 tablet by mouth daily. Yes Historical Provider, MD   gabapentin (NEURONTIN) 400 MG capsule TAKE 4 CAPS BY MOUTH NIGHTLY FOR SLEEP. Bk Padilla DO     PHYSICAL EXAMINATION:  [ INSTRUCTIONS:  \"[x]\" Indicates a positive item  \"[]\" Indicates a negative item  -- DELETE ALL ITEMS NOT EXAMINED]  Vital Signs: (As obtained by patient/caregiver or practitioner observation)    Blood pressure-  Heart rate-    Respiratory rate-    Temperature-  Pulse oximetry-     Constitutional: [] Appears well-developed and well-nourished [x] No apparent distress      [x] Abnormal-overweight  Mental status  [x] Alert and awake  [] Oriented to person/place/time [x]Able to follow commands      Eyes:  EOM    [x]  Normal  [] Abnormal-  Sclera  [x]  Normal  [] Abnormal -         Discharge [x]  None visible  [] Abnormal -    HENT:   [x] Normocephalic, atraumatic.   [] Abnormal   [x] Mouth/Throat: Mucous membranes are moist.     External Ears [] Normal  [] Abnormal-     Neck: [x] No visualized mass     Pulmonary/Chest: [x] Respiratory effort normal.  [x] No visualized signs of difficulty breathing or respiratory distress        [] Abnormal-      Musculoskeletal:   [x] Normal gait with no signs of ataxia         [] Normal range of motion of neck        [] Abnormal-       Neurological:        [x] No Facial Asymmetry (Cranial nerve 7 motor function) (limited exam to video visit)          [x] No gaze palsy        [] Abnormal-         Skin:        [x] No significant exanthematous lesions or discoloration noted on facial skin         [] Abnormal- Psychiatric:       [x] Normal Affect [] No Hallucinations        [] Abnormal-     Other pertinent observable physical exam findings-   Patient-Reported Vitals 6/30/2020   Patient-Reported Weight 212. 4LBS FULLY CLOTHED   Patient-Reported Height 5'9\"      BP Readings from Last 3 Encounters:   03/23/20 112/72   02/15/20 117/78   12/03/19 (!) 142/82     Pulse Readings from Last 3 Encounters:   03/23/20 105   02/15/20 79   12/03/19 100     Wt Readings from Last 3 Encounters:   03/23/20 212 lb (96.2 kg)   02/15/20 185 lb (83.9 kg)   12/03/19 185 lb (83.9 kg)     ASSESSMENT/PLAN:  AVS to mother. Serafin Mercado was seen today for anxiety. Diagnoses and all orders for this visit:    Essential hypertension  -     nadolol (CORGARD) 20 MG tablet; 1/2 tab a daily by mouth For high blood pressure. Good control in our office since December's high of 142/82. Remember that ideal blood pressure is less than 120/ less than 80. This is were you were in February and March. Avoid salty fast foods, salty snacks and routine daily salty foods like pork. Gastroesophageal reflux disease, esophagitis presence not specified  -     omeprazole (PRILOSEC) 20 MG delayed release capsule; Take 1 capsule by mouth every morning (before breakfast)  Borderline control. If control was good you would not have a cough with one missed dose. Avoid late night eating. You should stop eating 3 to 4 hours before bed. Starting 1-2 hours after dinner you can slowly sip fluids. Avoid drinking down a whole glass of water just before bed to keep that from coming back up. It also helps heartburn if your bowels are moving regularly and easily every day. If things do not leave your body they are slower to leave your stomach resulting in more reflux. Weight loss of as little as 5 pounds if it is abdominal weight will significantly reduce heartburn. Bipolar 1 disorder, mixed, full remission (HCC)  -     ziprasidone (GEODON) 40 MG capsule;  Take 1 capsule by mouth daily Take with 60 mg tab at dinner  -     ziprasidone (GEODON) 60 MG capsule; Take 1 capsule by mouth 2 times daily (with meals) Take a 60 mg with a 40 mg at dinner  -     lamoTRIgine (LAMICTAL) 200 MG tablet; TAKE 2 TABLETS 1 TIME DAILY AT [facility]  Sounds like you are doing better since you quit taking alcohol. Major depression in complete remission (HCC)  -     ziprasidone (GEODON) 40 MG capsule; Take 1 capsule by mouth daily Take with 60 mg tab at dinner  -     ziprasidone (GEODON) 60 MG capsule; Take 1 capsule by mouth 2 times daily (with meals) Take a 60 mg with a 40 mg at dinner  Remember that if you eat more you need to exercise more. If you do not have time your day-to-day life to exercise more you should eat less carbs fats and sweets. Healthy diet and exercise enhances depression treatment. Social anxiety disorder  -     clonazePAM (KLONOPIN) 1 MG tablet; Take 1 tablet by mouth 2 times daily for 90 days. Strong family life enhances social stability. Extrapyramidal movement disorder, drug-induced  -     trihexyphenidyl (ARTANE) 2 MG tablet; Take 1 tablet by mouth 2 times daily  Use as little clonazepam as possible since it seems to increase your tremor. Partial symptomatic epilepsy with complex partial seizures, not intractable, without status epilepticus (HCC)  -     clonazePAM (KLONOPIN) 1 MG tablet; Take 1 tablet by mouth 2 times daily for 90 days. Stay on the stable low dose. Vitamin D deficiency  -     Cholecalciferol (VITAMIN D3) 125 MCG (5000 UT) CAPS; Take 1 capsule by mouth daily (with breakfast)  Continue to be consistent with your vitamin D at least 5 times a week i.e. skip Monday and Thursday. This is presuming you have been consistently taking it every day since we found your level had dropped again last year. It takes about 1 year to catch up and then you can reduce your dose slightly.   A lot of psychiatrists now use vitamin D to stabilize mood issues. Dislocation of part of left shoulder girdle, sequela  We will refer you to [facility] orth[facility] if that is your wish and you are still having problems. We will also include some exercises to help strengthen the shoulder below. Educated about COVID-19 virus infection  The risk is likely to increase for COVID-19 infection as the reopening occurs. This is because while some people are being careful with social distancing masks and social isolation other people are totally ignoring it. Preventing the Spread of Coronavirus Disease 2019 in Homes and Residential Communities   For the most recent information go to [employer]    Prevention steps for People with confirmed or suspected COVID-19 (including persons under investigation) who do not need to be hospitalized  and   People with confirmed COVID-19 who were hospitalized and determined to be medically stable to go home    Your healthcare provider and public health staff will evaluate whether you can be cared for at home. If it is determined that you do not need to be hospitalized and can be isolated at home, you will be monitored by staff from your local or state health department. You should follow the prevention steps below until a healthcare provider or local or state health department says you can return to your normal activities. Stay home except to get medical care  People who are mildly ill with COVID-19 are able to isolate at home during their illness. You should restrict activities outside your home, except for getting medical care. Do not go to work, school, or public areas. Avoid using public transportation, ride-sharing, or taxis. Separate yourself from other people and animals in your home  People: As much as possible, you should stay in a specific room and away from other people in your home. Also, you should use a separate bathroom, if available. Animals:  You should restrict contact with pets and other animals while you are sick with COVID-19, just like you would around other people. Although there have not been reports of pets or other animals becoming sick with COVID-19, it is still recommended that people sick with COVID-19 limit contact with animals until more information is known about the virus. When possible, have another member of your household care for your animals while you are sick. If you are sick with COVID-19, avoid contact with your pet, including petting, snuggling, being kissed or licked, and sharing food. If you must care for your pet or be around animals while you are sick, wash your hands before and after you interact with pets and wear a facemask. Call ahead before visiting your doctor  If you have a medical appointment, call the healthcare provider and tell them that you have or may have COVID-19. This will help the healthcare providers office take steps to keep other people from getting infected or exposed. Wear a facemask  You should wear a facemask when you are around other people (e.g., sharing a room or vehicle) or pets and before you enter a healthcare providers office. If you are not able to wear a facemask (for example, because it causes trouble breathing), then people who live with you should not stay in the same room with you, or they should wear a facemask if they enter your room. Cover your coughs and sneezes  Cover your mouth and nose with a tissue when you cough or sneeze. Throw used tissues in a lined trash can. Immediately wash your hands with soap and water for at least 20 seconds or, if soap and water are not available, clean your hands with an alcohol-based hand  that contains at least 60% alcohol. Clean your hands often  Wash your hands often with soap and water for at least 20 seconds, especially after blowing your nose, coughing, or sneezing; going to the bathroom; and before eating or preparing food.  If soap and water are not readily available, use an alcohol-based hand  with at least 60% alcohol, covering all surfaces of your hands and rubbing them together until they feel dry. Soap and water are the best option if hands are visibly dirty. Avoid touching your eyes, nose, and mouth with unwashed hands. Avoid sharing personal household items  You should not share dishes, drinking glasses, cups, eating utensils, towels, or bedding with other people or pets in your home. After using these items, they should be washed thoroughly with soap and water. Clean all high-touch surfaces everyday  High touch surfaces include counters, tabletops, doorknobs, bathroom fixtures, toilets, phones, keyboards, tablets, and bedside tables. Also, clean any surfaces that may have blood, stool, or body fluids on them. Use a household cleaning spray or wipe, according to the label instructions. Labels contain instructions for safe and effective use of the cleaning product including precautions you should take when applying the product, such as wearing gloves and making sure you have good ventilation during use of the product. Monitor your symptoms  Seek prompt medical attention if your illness is worsening (e.g., difficulty breathing). Before seeking care, call your healthcare provider and tell them that you have, or are being evaluated for, COVID-19. Put on a facemask before you enter the facility. These steps will help the healthcare providers office to keep other people in the office or waiting room from getting infected or exposed. Ask your healthcare provider to call the local or state health department. Persons who are placed under active monitoring or facilitated self-monitoring should follow instructions provided by their local health department or occupational health professionals, as appropriate. When working with your local health department check their available hours.   If you have a medical emergency and need to call 911, notify the dispatch personnel that you have, or are being evaluated for COVID-19. If possible, put on a facemask before emergency medical services arrive. Discontinuing home isolation  Patients with confirmed COVID-19 should remain under home isolation precautions until the risk of secondary transmission to others is thought to be low. The decision to discontinue home isolation precautions should be made on a case-by-case basis, in consultation with healthcare providers and state and local health departments. Use Tylenol NOT Ibuprofen/Aleve/aspirin for pain or fevers. There is a theoretical decrease in the body's ability to fight the virus when you are infected if you are on NSAIDs. The FDA has said that this information is not yet proven. The newest evidence suggest that wearing a mask in public may be beneficial if you remember that the outside of it is contaminated and treat it accordingly. It also helps prevent spread if someone has an asymptomatic or presymptomatic case and does not know it yet. Even cloth masks can be beneficial.    Advance Care Planning  People with COVID-19 may have no symptoms, mild symptoms, such as fever, cough, and shortness of breath or they may have more severe illness, developing severe and fatal pneumonia. As a result, Advance Care Planning with attention to naming a health care decision maker (someone you trust to make healthcare decisions for you if you could not speak for yourself) and sharing other health care preferences is important BEFORE a possible health crisis. Please contact your Primary Care Provider to discuss Advance Care Planning. Vitamin D by mouth and vitamin C intravenously are being used as part of the treatment regimen for COVID-19 in some hospitalized patients. A normal vitamin D level is . Ideal is about 50-80. You have vitamin D deficiency:   Ref.  Range 9/13/2016 10:40 3/25/2019 11:52   Vit D, 25-Hydroxy  >=30 ng/mL 32.8 26.8 (L) Vitamin D strengthens your immune system. It helps to potentially prevent getting COVID-19 and also strengthens your immune system to fight it so that you are likely to have a mild case if you get it. Recommend you be consistent vitamin D 5000 IU at least 5 times a week. Also start vitamin C 500 mg daily. Both of these should be continued for the duration of the moderate risk portion of the COVID-19 pandemic expected to be 1 year. Continue wearing a mask when around people except those living in the same house who are not infected with or heavily exposed to COVID-19, handwashing/hand gel use, social distancing, and social isolation for nonessential activities. IF you develop ANY respiratory infection symptoms (not just allergy symptoms) suggestive of COVID-19 start the recommendations below: Instructions for Respiratory Infections (SAVE THIS SHEET)    For the first 7-14 days of symptoms follow instructions below, even before being seen in the office or even during treatment with antibiotics, until symptom free. 1. Water: Drink 1 ounce of water for every 2 pounds of body weight for adults, you need 96 ounces of water/fluids per day. This will loosen mucus in the head and chest & improve the weak feeling of dehydration, al_low the body to get germ fighting resources to the infection. Half of fluids can be juice or sugar free Crystal Light. Don't count drinks with caffeine, alcohol or carbonation. Infants can have Pedialyte liquid or freezer pops. Avoid salt and sports drinks if you have high Blood Pressure, swelling in the feet or ankles or have heart problems. 2. Humidity: Humidify the air to 35-50% ( or until the windows fog over slightly). Can use a humidifier, vaporizer, boil water on the stove or put a coffee can full of water on the heater vents. This will loosen mucus from infections and allergies.   3. Sleep: Get 8-10 hours a night and rest during the Place your hands slightly wider than your shoulders. 2. Keeping your elbows straight, slowly raise your arms over your head until you feel a stretch in your shoulders, upper back, and chest.  3. Hold 15 to 30 seconds. 4. Repeat 2 to 4 times. Shoulder blade squeeze   1. While standing with your arms at your sides, squeeze your shoulder blades together. Do not raise your shoulders as you are squeezing. 2. Hold for 6 seconds. 3. Repeat 8 to 12 times. Internal rotator strengthening exercise   For this exercise, you will need elastic exercise material, such as surgical tubing or Thera-Band. 1. Begin by tying a piece of elastic exercise material to a doorknob. 2. Stand or sit with your shoulder relaxed and your elbow bent 90 degrees (like the angle of the letter \"L\"). Your upper arm should rest comfortably against your side. Squeeze a rolled towel between your elbow and your body for comfort and to help keep your arm at your side. 3. Hold one end of the elastic band in the hand of the painful arm. 4. Rotate your forearm toward your body until it touches your belly. 5. Keep your elbow and upper arm firmly tucked against the towel roll or the side of your body during this movement. 6. Repeat 8 to 12 times. Isometric shoulder external rotation   1. Stand with your affected arm close to a wall. 2. Bend your arm up so your elbow is at a 90 degree angle (like the letter \"L\"), and turn your palm as if you are about to shake someone's hand. 3. Hold your forearm and elbow close to the wall. Press the back of your hand into the wall with moderate pressure. 4. Hold for a count of 6.  5. Repeat 8 to 12 times. Isometric shoulder abduction   1. Stand with your affected arm close to a wall. 2. Bend your arm up so your elbow is at a 90 degree angle (like the letter \"L\"), and turn your palm as if you are about to shake someone's hand. 3. Hold your forearm and elbow close to the wall.  Press your elbow into the Pursuant to the emergency declaration under the [facility], [address] [place] [address] authority and the [employer] and [employer] Act, this Virtual Visit was conducted with patient's (and/or legal guardian's) consent, to reduce the patient's risk of exposure to COVID-19 and provide necessary medical care. The patient (and/or legal guardian) has also been advised to contact this office for worsening conditions or problems, and seek emergency medical treatment and/or call 911 if deemed necessary. Patient identification was verified at the start of the visit: Yes    Total time spent on this encounter: 27 minutes of which more than half is spent in counseling. Services were provided through a video synchronous discussion virtually to substitute for in-person clinic visit. Patient and provider were located at their individual homes. --Ellie Lamb DO on 6/30/2020 at 3:15 PM    An electronic signature was used to authenticate this note.

## 2020-08-02 NOTE — PATIENT INSTRUCTIONS
Wolf Brown was seen today for anxiety. Diagnoses and all orders for this visit:    Essential hypertension  -     nadolol (CORGARD) 20 MG tablet; 1/2 tab a daily by mouth For high blood pressure. Good control in our office since December's high of 142/82. Remember that ideal blood pressure is less than 120/ less than 80. This is were you were in February and March. Avoid salty fast foods, salty snacks and routine daily salty foods like pork. Gastroesophageal reflux disease, esophagitis presence not specified  -     omeprazole (PRILOSEC) 20 MG delayed release capsule; Take 1 capsule by mouth every morning (before breakfast)  Borderline control. If control was good you would not have a cough with one missed dose. Avoid late night eating. You should stop eating 3 to 4 hours before bed. Starting 1-2 hours after dinner you can slowly sip fluids. Avoid drinking down a whole glass of water just before bed to keep that from coming back up. It also helps heartburn if your bowels are moving regularly and easily every day. If things do not leave your body they are slower to leave your stomach resulting in more reflux. Weight loss of as little as 5 pounds if it is abdominal weight will significantly reduce heartburn. Bipolar 1 disorder, mixed, full remission (HCC)  -     ziprasidone (GEODON) 40 MG capsule; Take 1 capsule by mouth daily Take with 60 mg tab at dinner  -     ziprasidone (GEODON) 60 MG capsule; Take 1 capsule by mouth 2 times daily (with meals) Take a 60 mg with a 40 mg at dinner  -     lamoTRIgine (LAMICTAL) 200 MG tablet; TAKE 2 TABLETS 1 TIME DAILY AT [facility]  Sounds like you are doing better since you quit taking alcohol. Major depression in complete remission (HCC)  -     ziprasidone (GEODON) 40 MG capsule; Take 1 capsule by mouth daily Take with 60 mg tab at dinner  -     ziprasidone (GEODON) 60 MG capsule;  Take 1 capsule by mouth 2 times daily (with meals) Take a 60 mg with a 40 mg at dinner  Remember that if you eat more you need to exercise more. If you do not have time your day-to-day life to exercise more you should eat less carbs fats and sweets. Healthy diet and exercise enhances depression treatment. Social anxiety disorder  -     clonazePAM (KLONOPIN) 1 MG tablet; Take 1 tablet by mouth 2 times daily for 90 days. Strong family life enhances social stability. Extrapyramidal movement disorder, drug-induced  -     trihexyphenidyl (ARTANE) 2 MG tablet; Take 1 tablet by mouth 2 times daily  Use as little clonazepam as possible since it seems to increase your tremor. Partial symptomatic epilepsy with complex partial seizures, not intractable, without status epilepticus (HCC)  -     clonazePAM (KLONOPIN) 1 MG tablet; Take 1 tablet by mouth 2 times daily for 90 days. Stay on the stable low dose. Vitamin D deficiency  -     Cholecalciferol (VITAMIN D3) 125 MCG (5000 UT) CAPS; Take 1 capsule by mouth daily (with breakfast)  Continue to be consistent with your vitamin D at least 5 times a week i.e. skip Monday and Thursday. This is presuming you have been consistently taking it every day since we found your level had dropped again last year. It takes about 1 year to catch up and then you can reduce your dose slightly. A lot of psychiatrists now use vitamin D to stabilize mood issues. Dislocation of part of left shoulder girdle, sequela  We will refer you to [facility] if that is your wish and you are still having problems. We will also include some exercises to help strengthen the shoulder below. Educated about COVID-19 virus infection  The risk is likely to increase for COVID-19 infection as the reopening occurs. This is because while some people are being careful with social distancing masks and social isolation other people are totally ignoring it.      Preventing the Spread of Coronavirus Disease 2019 in Homes and Residential Communities   For the most recent information go to [email or web address]    Prevention steps for People with confirmed or suspected COVID-19 (including persons under investigation) who do not need to be hospitalized  and   People with confirmed COVID-19 who were hospitalized and determined to be medically stable to go home    Your healthcare provider and public health staff will evaluate whether you can be cared for at home. If it is determined that you do not need to be hospitalized and can be isolated at home, you will be monitored by staff from your local or state health department. You should follow the prevention steps below until a healthcare provider or local or state health department says you can return to your normal activities. Stay home except to get medical care  People who are mildly ill with COVID-19 are able to isolate at home during their illness. You should restrict activities outside your home, except for getting medical care. Do not go to work, school, or public areas. Avoid using public transportation, ride-sharing, or taxis. Separate yourself from other people and animals in your home  People: As much as possible, you should stay in a specific room and away from other people in your home. Also, you should use a separate bathroom, if available. Animals: You should restrict contact with pets and other animals while you are sick with COVID-19, just like you would around other people. Although there have not been reports of pets or other animals becoming sick with COVID-19, it is still recommended that people sick with COVID-19 limit contact with animals until more information is known about the virus. When possible, have another member of your household care for your animals while you are sick. If you are sick with COVID-19, avoid contact with your pet, including petting, snuggling, being kissed or licked, and sharing food.  If you must care for your pet or be around include counters, tabletops, doorknobs, bathroom fixtures, toilets, phones, keyboards, tablets, and bedside tables. Also, clean any surfaces that may have blood, stool, or body fluids on them. Use a household cleaning spray or wipe, according to the label instructions. Labels contain instructions for safe and effective use of the cleaning product including precautions you should take when applying the product, such as wearing gloves and making sure you have good ventilation during use of the product. Monitor your symptoms  Seek prompt medical attention if your illness is worsening (e.g., difficulty breathing). Before seeking care, call your healthcare provider and tell them that you have, or are being evaluated for, COVID-19. Put on a facemask before you enter the facility. These steps will help the healthcare providers office to keep other people in the office or waiting room from getting infected or exposed. Ask your healthcare provider to call the local or state health department. Persons who are placed under active monitoring or facilitated self-monitoring should follow instructions provided by their local health department or occupational health professionals, as appropriate. When working with your local health department check their available hours. If you have a medical emergency and need to call 911, notify the dispatch personnel that you have, or are being evaluated for COVID-19. If possible, put on a facemask before emergency medical services arrive. Discontinuing home isolation  Patients with confirmed COVID-19 should remain under home isolation precautions until the risk of secondary transmission to others is thought to be low. The decision to discontinue home isolation precautions should be made on a case-by-case basis, in consultation with healthcare providers and state and local health departments. Use Tylenol NOT Ibuprofen/Aleve/aspirin for pain or fevers.   There is a theoretical decrease in distancing, and social isolation for nonessential activities. IF you develop ANY respiratory infection symptoms (not just allergy symptoms) suggestive of COVID-19 start the recommendations below: Instructions for Respiratory Infections (SAVE THIS SHEET)    For the first 7-14 days of symptoms follow instructions below, even before being seen in the office or even during treatment with antibiotics, until symptom free. 1. Water: Drink 1 ounce of water for every 2 pounds of body weight for adults, you need 96 ounces of water/fluids per day. This will loosen mucus in the head and chest & improve the weak feeling of dehydration, al_low the body to get germ fighting resources to the infection. Half of fluids can be juice or sugar free Crystal Light. Don't count drinks with caffeine, alcohol or carbonation. Infants can have Pedialyte liquid or freezer pops. Avoid salt and sports drinks if you have high Blood Pressure, swelling in the feet or ankles or have heart problems. 2. Humidity: Humidify the air to 35-50% ( or until the windows fog over slightly). Can use a humidifier, vaporizer, boil water on the stove or put a coffee can full of water on the heater vents. This will loosen mucus from infections and allergies. 3. Sleep: Get 8-10 hours a night and rest during the evening after work or school. If you have trouble sleeping, adults can take Melatonin 5mg up to 2 tabs at bedtime ( not for children or pregnant women). If Mono is suspected then sleep during 9PM to 9AM time span (if possible.)  4. Cough: Take cough medicines with Guaifenesin ( to loosen chest or head congestion) and Dextromethorphan ( to decrease excess cough). Robitussin D.M. Syrup every 4-6 hrs OR Mucinex D. M. pills OR Delsym DM syrup twice a day. Use the pediatric formulations for children over 6 months making sure they are alcohol & sugar free for children, pregnant women, and diabetics.   5. Pain And Fevers: Take Acetaminophen ( Tylenol) for fevers, aches, and headaches. 2-500 mg every 8 hours for adults. Appropriate doses at bedtime for children may help them sleep better. If pregnant take 1 -500 mg (Tylenol) every 8 hours as needed. Ibuprofen/Aleve/aspirin for pain and fevers SHOULD NOT BE USED IN THE SETTING OF POSSIBLE COVID-19 viral infection NOR if pregnant, if you have acid reflux, high blood pressure, CHF, or kidney problems. 6.Gargle: (DAY ONE OF SYMPTOMS) Gargle in the back of the throat with the head tilted back and to the sides with a strong mouthwash  ( Listerine or Scope) after meals and at bedtime at least 4 -5 times a day. This helps kill bacteria and viruses in the back of the throat and will shorten the duration and decrease the severity of your symptoms: sore throat, cough, ear popping,/ear pain, and possibly dizziness. 7. Smoking: Avoid smoking or exposure to second hand smoke. 8. Zinc: (DAY ONE OF SYMPTOMS)  Zinc lozenges such as Cold [place] (available most stores), or Basic ([employer] brand) will help shorten the duration and lessen symptoms such as sore throat, cough, nasal congestion, runny nose, and post nasal drip. Use 1 lozenge every 2-4 hours ( after meals if stomach is sensitive). Children can use 10-15 mg or less 3-4 times a day or Zinc lollypops. In pregnancy limit to 50-60 mg a day for 7 days as prenatals have Zinc also. With diarrhea use zinc pills 50 mg 1/2 to 1 pill 2x/day. 9. Vitamins: Vitamin C 500 mg with breakfast and dinner (with suspected or diagnosed COVID-19 infection take vitamin C 1000 mg 2-3 times a day). Children and pregnant women should drink citrus juices. This speeds healing and strengthens immune system. 10. Chest Symptoms: Vicks Vapor rub to the chest at bedtime. 11. Decongestants: Avoid all decongestants and antihistamine cold preparations in children.   Decongestants should always be avoided in people with high blood pressure, palpitations, heart disease and by tying a piece of elastic exercise material to a doorknob. 2. Stand or sit with your shoulder relaxed and your elbow bent 90 degrees (like the angle of the letter \"L\"). Your upper arm should rest comfortably against your side. Squeeze a rolled towel between your elbow and your body for comfort and to help keep your arm at your side. 3. Hold one end of the elastic band in the hand of the painful arm. 4. Rotate your forearm toward your body until it touches your belly. 5. Keep your elbow and upper arm firmly tucked against the towel roll or the side of your body during this movement. 6. Repeat 8 to 12 times. Isometric shoulder external rotation   1. Stand with your affected arm close to a wall. 2. Bend your arm up so your elbow is at a 90 degree angle (like the letter \"L\"), and turn your palm as if you are about to shake someone's hand. 3. Hold your forearm and elbow close to the wall. Press the back of your hand into the wall with moderate pressure. 4. Hold for a count of 6.  5. Repeat 8 to 12 times. Isometric shoulder abduction   1. Stand with your affected arm close to a wall. 2. Bend your arm up so your elbow is at a 90 degree angle (like the letter \"L\"), and turn your palm as if you are about to shake someone's hand. 3. Hold your forearm and elbow close to the wall. Press your elbow into the wall with moderate pressure. 4. Hold for a count of 6.  5. Repeat 8 to 12 times. Wall push-ups   1. Stand against a wall with your feet about 12 to 24 inches away from the wall. If you feel any pain when you do this exercise, stand closer to the wall. 2. Place your hands on the wall slightly wider apart than your shoulders, and lean forward. 3. Gently lean your body toward the wall. Then push back to your starting position. Keep the motion smooth and controlled. 4. Repeat 8 to 12 times. Follow-up care is a key part of your treatment and safety.  Be sure to make and go to all appointments, and call

## 2020-08-07 RX ORDER — GABAPENTIN 400 MG/1
CAPSULE ORAL
Qty: 120 CAPSULE | Refills: 0 | Status: SHIPPED | OUTPATIENT
Start: 2020-08-07 | End: 2020-09-25 | Stop reason: SDUPTHER

## 2020-08-21 ENCOUNTER — TELEPHONE (OUTPATIENT)
Dept: FAMILY MEDICINE CLINIC | Age: 40
End: 2020-08-21

## 2020-08-23 NOTE — TELEPHONE ENCOUNTER
Dislocation of part of left shoulder girdle, sequela  -     External Referral To Orthopedic Surgery  Fax referral and patient summary from miscellaneous to [facility] mile location of [facility].

## 2020-08-31 RX ORDER — TRAZODONE HYDROCHLORIDE 100 MG/1
TABLET ORAL
Qty: 30 TABLET | Refills: 0 | Status: SHIPPED | OUTPATIENT
Start: 2020-08-31 | End: 2020-09-25 | Stop reason: SDUPTHER

## 2020-09-09 RX ORDER — AMLODIPINE BESYLATE 2.5 MG/1
TABLET ORAL
Qty: 30 TABLET | Refills: 0 | Status: SHIPPED | OUTPATIENT
Start: 2020-09-09 | End: 2020-09-25 | Stop reason: SDUPTHER

## 2020-09-18 ENCOUNTER — OFFICE VISIT (OUTPATIENT)
Dept: ORTHOPEDIC SURGERY | Age: 40
End: 2020-09-18
Payer: COMMERCIAL

## 2020-09-18 VITALS — WEIGHT: 212 LBS | BODY MASS INDEX: 31.4 KG/M2 | HEIGHT: 69 IN

## 2020-09-18 PROCEDURE — 4004F PT TOBACCO SCREEN RCVD TLK: CPT | Performed by: ORTHOPAEDIC SURGERY

## 2020-09-18 PROCEDURE — 99203 OFFICE O/P NEW LOW 30 MIN: CPT | Performed by: ORTHOPAEDIC SURGERY

## 2020-09-18 PROCEDURE — G8417 CALC BMI ABV UP PARAM F/U: HCPCS | Performed by: ORTHOPAEDIC SURGERY

## 2020-09-18 PROCEDURE — G8427 DOCREV CUR MEDS BY ELIG CLIN: HCPCS | Performed by: ORTHOPAEDIC SURGERY

## 2020-09-18 NOTE — PROGRESS NOTES
Chief Complaint  New Patient (left shoulder: hx of shoulder dislocation when 5years old never got it fixed, has always had instability of the shoulder, no numbness/tingling. NO PT, NO SX, limited  ROM due to pain,  )      History of Present Illness:  Faustino Pete is a 47 y.o. y/o male who presents today for evaluation of his left shoulder. He has had issues with his shoulder for years. He states that his scapula has always been very mobile and he can push it into a position which is asymmetric. This causes pain. He states he did have an injury to his shoulder when he was 5years old. No prior surgeries or injections. No numbness or tingling. No neck pain. No contralateral symptoms. No shoulder dislocations which have had to be reduced. Occupation/activities: Horse work    Medical History  Past Medical History:   Diagnosis Date    Abscess of elbow 2012    right    Alcoholism /alcohol abuse ([address] 1/1/2005    Stopped 2014    Arthritis     Durham's esophagus     Bipolar 1 disorder, mixed, full remission ([address] 1/1/2006    Contusion of wrist, right 04/01/2016    Dislocation of part of left shoulder girdle 01/01/1985    arm pulled out of socket - no medical visit at the time    Erosive gastropathy 03/23/2018    antrum    Essential hypertension 1/1/2006    lost 100 lbs in 1 year    GERD (gastroesophageal reflux disease)     Head injury due to trauma     Frontal lobe trauma causing seizures and shakes.  No workup till after seizures/shakes appeared    Major depression in complete remission ([address] 1/1/2006    while on medicine    Osteomyelitis ([address] 1/1/2003    right tibia    Partial complex seizure disorder without intractable epilepsy ([address] 1/1/2006    fist clenching is a typical pre-seizure activity for him    Psychiatric diagnosis     Saw Dr. Daniel Schmitt  [phone]    Social anxiety disorder 1/1/2006    moderate       Past Surgical History:   Procedure Laterality Date    APPENDECTOMY  5/22/2012 lap appy    ELBOW DEBRIDEMENT Right     MRSA    KNEE SURGERY Right     debridement osteomyelitis    UPPER GASTROINTESTINAL ENDOSCOPY  2010    UPPER GASTROINTESTINAL ENDOSCOPY  2018    gastric and esophageal bx, erosive gastropathy    WISDOM TOOTH EXTRACTION         Social History     Socioeconomic History    Marital status: Single     Spouse name: Not on file    Number of children: 0    Years of education: 15    Highest education level: Not on file   Occupational History    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  Have almost quit (nicorette) 3/13/17. Restarted 18 < 1 PPD. .19   Substance and Sexual Activity    Alcohol use: Yes     Alcohol/week: 0.0 standard drinks     Comment: Occasional 3-4 beers. Has not drank at all for 4 weeks. doesn't want to lose GF.     Drug use: Yes     Types: Marijuana    Sexual activity: Never   Lifestyle    Physical activity     Days per week: Not on file     Minutes per session: Not on file    Stress: Not on file   Relationships    Social connections     Talks on phone: Not on file     Gets together: Not on file     Attends Sabianism service: Not on file     Active member of club or organization: Not on file     Attends meetings of clubs or organizations: Not on file     Relationship status: Not on file    Intimate partner violence     Fear of current or ex partner: Not on file     Emotionally abused: Not on file     Physically abused: Not on file     Forced sexual activity: Not on file   Other Topics Concern    Not on file   Social History Narrative    Only exercise is work. 6/8/17. 9/18/17. 4/12/18. 7/23/18. 10/22/18. 1/21/19. 5/6/19. 8/9/19.11/27/19. less time shoeing horse. Family History   Problem Relation Age of Onset    Diabetes Mother    Chu Rheum Arthritis Mother     Depression Mother     Hypertension Mother     High Cholesterol Mother     Osteoarthritis Mother     Obesity Mother     Other Mother         varicose veins    Diabetes Father     High Blood Pressure Father     High Cholesterol Father     Other Father         interstitial cystitis, DDD C spine    Heart Disease Father         TACHYCARDIA    Mental Illness Brother         schizophrenia - dissociated    Obesity Brother     Diabetes Brother     Liver Disease Brother         fatty liver    Obesity Brother     Other Brother         ervin    Stroke Maternal Grandmother     Diabetes Maternal Grandfather     Coronary Art Dis Maternal Grandfather     Heart Surgery Maternal Grandfather 54        CABG    Other Maternal Grandfather         PAD with amputations    Diabetes Paternal Grandmother     Other Brother         tonsilitis, gall bladder,        Review of Systems  Pertinent items are noted in HPI  Review of systems reviewed from Patient History Form dated on 9/18/2020 and available in the patient's chart under the Media tab. Vital Signs  There were no vitals filed for this visit. General Exam:   Constitutional: Patient is adequately groomed with no evidence of malnutrition  Mental Status: The patient is oriented to time, place and person. The patient's mood and affect are appropriate. Lymphatic: The lymphatic examination bilaterally reveals all areas to be without enlargement or induration. Neurological: The patient has good coordination. There is no weakness or sensory deficit.      Gait: normal    Left shoulder Examination    Inspection: No atrophy or bruising    Palpation: Mild tenderness palpation in the anterior shoulder as well as along the parascapular musculature    Cervical Exam reproduces shoulder symptoms: Negative    Range of Motion: Forward elevation: 170  External rotation at 0 degrees abduction: 45  Internal rotation to: L1  External rotation at 90 degrees abduction: 90  Internal rotation at 90 degrees abduction: 80  Symmetric range of motion    Sensation: In tact to light touch all nerve distributions     Strength:   5/5 supraspinatus  5/5 external rotation  5/5 internal rotation  5/5 anterior deltoid strength testing  5/5 abduction strength testing  Lift off: negative  Crepitus: negative    Special Tests:  O'mehrdad's: negative  Speed's: negative  Yergason's: negative  Cai Impingement: negative  Neer Impingement: negative    Anterior apprehension test: negative  Relief with relocation testing: negative  Anterior labral click: negative  Pain with posterior translation: negative  Posterior labral click: negative    Elbow/MP hyperlaxity: Mild  Sulcus sign: negative  Scapular dyskinesis: positive    Positive scapular winging with push test -appears to be medial winging    Skin: There are no additional worrisome rashes, ulcerations or lesions. Circulation normal    Additional Examinations:  Right Upper Extremity:  Examination of the right upper extremity does not show any tenderness, deformity or injury. Range of motion is unremarkable. There is no gross instability. There are no rashes, ulcerations or lesions. Strength and tone are normal.      Radiology:     X-rays obtained and reviewed in office:  4 views AP/Grashey/Axillary/Scapular Y of the left shoulder dated 9/18/2020 demonstrate no acute fracture. No dislocation. No major degenerative changes. Normal alignment. No visible bony lesions or loose bodies.       Assessment:  61-year-old male with left shoulder scapular winging and dyskinesis    Office Procedures:  Orders Placed This Encounter   Procedures    XR SHOULDER LEFT (MIN 2 VIEWS)     Standing Status:   Future     Number of Occurrences:   1     Standing Expiration Date:   9/18/2021       Plan:   -We discussed medications, ice, activity modification. He states he cannot take NSAIDs secondary to gastric issues. Did discuss the possibility of topical agents and that he should check with his primary care doctor  -In addition we will get him into physical therapy to work on scapular stabilization and core  -Ice, activity modification  -Follow-up in 6 weeks he continues have significant symptoms    Bradley Field MD  [id number] partner of [facility])      Voice Recognition Dictation disclaimer: Please note that portions of this chart were generated using Dragon dictation software. Although every effort was made to ensure the accuracy of this automated transcription, some errors in transcription may have occurred.

## 2020-09-25 ENCOUNTER — VIRTUAL VISIT (OUTPATIENT)
Dept: FAMILY MEDICINE CLINIC | Age: 40
End: 2020-09-25
Payer: COMMERCIAL

## 2020-09-25 ENCOUNTER — HOSPITAL ENCOUNTER (OUTPATIENT)
Dept: PHYSICAL THERAPY | Age: 40
Setting detail: THERAPIES SERIES
Discharge: HOME | End: 2020-09-25
Payer: COMMERCIAL

## 2020-09-25 PROCEDURE — 99214 OFFICE O/P EST MOD 30 MIN: CPT | Performed by: FAMILY MEDICINE

## 2020-09-25 PROCEDURE — G8427 DOCREV CUR MEDS BY ELIG CLIN: HCPCS | Performed by: FAMILY MEDICINE

## 2020-09-25 RX ORDER — NADOLOL 20 MG/1
TABLET ORAL
Qty: 45 TABLET | Refills: 1 | Status: CANCELLED | OUTPATIENT
Start: 2020-09-25

## 2020-09-25 RX ORDER — FEXOFENADINE HCL 180 MG/1
180 TABLET ORAL DAILY
Qty: 30 TABLET | Refills: 11 | Status: SHIPPED | OUTPATIENT
Start: 2020-09-25

## 2020-09-25 RX ORDER — CLONAZEPAM 1 MG/1
1 TABLET ORAL 2 TIMES DAILY
Qty: 60 TABLET | Refills: 2 | Status: SHIPPED | OUTPATIENT
Start: 2020-09-25 | End: 2021-01-08

## 2020-09-25 RX ORDER — UREA 10 %
800 LOTION (ML) TOPICAL DAILY
Qty: 30 TABLET | Refills: 11 | Status: SHIPPED | OUTPATIENT
Start: 2020-09-25 | End: 2021-08-04 | Stop reason: SDUPTHER

## 2020-09-25 RX ORDER — OMEPRAZOLE 20 MG/1
20 CAPSULE, DELAYED RELEASE ORAL
Qty: 30 CAPSULE | Refills: 2 | Status: SHIPPED | OUTPATIENT
Start: 2020-09-25 | End: 2021-01-08 | Stop reason: SDUPTHER

## 2020-09-25 RX ORDER — GABAPENTIN 400 MG/1
CAPSULE ORAL
Qty: 120 CAPSULE | Refills: 2 | Status: SHIPPED | OUTPATIENT
Start: 2020-09-25 | End: 2021-01-11

## 2020-09-25 RX ORDER — ZIPRASIDONE HYDROCHLORIDE 40 MG/1
40 CAPSULE ORAL DAILY
Qty: 30 CAPSULE | Refills: 2 | Status: SHIPPED | OUTPATIENT
Start: 2020-09-25 | End: 2021-01-08 | Stop reason: SDUPTHER

## 2020-09-25 RX ORDER — ZIPRASIDONE HYDROCHLORIDE 60 MG/1
60 CAPSULE ORAL 2 TIMES DAILY WITH MEALS
Qty: 60 CAPSULE | Refills: 2 | Status: SHIPPED | OUTPATIENT
Start: 2020-09-25 | End: 2021-01-08 | Stop reason: SDUPTHER

## 2020-09-25 RX ORDER — AMLODIPINE BESYLATE 2.5 MG/1
TABLET ORAL
Qty: 30 TABLET | Refills: 2 | Status: SHIPPED | OUTPATIENT
Start: 2020-09-25 | End: 2021-01-08 | Stop reason: SDUPTHER

## 2020-09-25 RX ORDER — IBUPROFEN 800 MG/1
800 TABLET ORAL 4 TIMES DAILY PRN
Qty: 120 TABLET | Refills: 2 | Status: CANCELLED | OUTPATIENT
Start: 2020-09-25

## 2020-09-25 RX ORDER — TRIHEXYPHENIDYL HYDROCHLORIDE 2 MG/1
2 TABLET ORAL 2 TIMES DAILY
Qty: 60 TABLET | Refills: 2 | Status: SHIPPED | OUTPATIENT
Start: 2020-09-25 | End: 2021-01-08 | Stop reason: SDUPTHER

## 2020-09-25 RX ORDER — TRAZODONE HYDROCHLORIDE 100 MG/1
TABLET ORAL
Qty: 30 TABLET | Refills: 1 | Status: SHIPPED | OUTPATIENT
Start: 2020-09-25 | End: 2021-01-08 | Stop reason: SDUPTHER

## 2020-09-25 RX ORDER — CLONAZEPAM 2 MG/1
2 TABLET ORAL 2 TIMES DAILY PRN
Qty: 60 TABLET | Refills: 0 | Status: SHIPPED | OUTPATIENT
Start: 2020-09-25 | End: 2021-01-08 | Stop reason: SDUPTHER

## 2020-09-25 RX ORDER — LAMOTRIGINE 200 MG/1
TABLET ORAL
Qty: 60 TABLET | Refills: 5 | Status: CANCELLED | OUTPATIENT
Start: 2020-09-25

## 2020-09-25 NOTE — PATIENT INSTRUCTIONS
Rama Jennings was seen today for hypertension and seizures. Diagnoses and all orders for this visit:    Essential hypertension  -     amLODIPine (NORVASC) 2.5 MG tablet; TAKE 1 TABLET DAILY  Buy a home blood pressure cuff so you can monitor how your blood pressure is doing between visits every 2 weeks or so and if you are having symptoms. Record your blood pressures on paper or in his smart phone. Weigh yourself weekly. Record those weights on paper or in your smart phone and bring them to the office at each visit or have them available at video visits. You are due for blood work to monitor your effects of blood pressure medicine, seizure medicines, and cholesterol. Please schedule a morning in person visit so that we can draw your blood and examine you. Partial symptomatic epilepsy with complex partial seizures, not intractable, without status epilepticus (HCC)  -     gabapentin (NEURONTIN) 400 MG capsule; TAKE 4 CAPSULES BY MOUTH NIGHTLY FOR SLEEP  -     clonazePAM (KLONOPIN) 1 MG tablet; Take 1 tablet by mouth 2 times daily for 90 days. -     clonazePAM (KLONOPIN) 2 MG tablet; Take 1 tablet by mouth 2 times daily as needed (SEIZURES) for up to 196 days. TAKE TO ABORT SEIZURES  Fair to good control with only one seizure episode since last seen. -     Continue meds and lifestyle control. Gastroesophageal reflux disease, esophagitis presence not specified  -     omeprazole (PRILOSEC) 20 MG delayed release capsule; Take 1 capsule by mouth every morning (before breakfast)  -     Good control now. Symptoms will worsen with weight gain. Cut back on portion sizes. Would recommend a heart healthy cookbook. You also may want to consider a diabetic cookbook since you have a strong family history of diabetes you are at high risk to develop diabetes if you gain weight. Things like heart problems, strokes, amputations, kidney problems and vision loss are very common in diabetes.   Diabetes however it is avoidable with a exercise, good diet, slow weight loss, and frequent small regular meals.  -     Continue meds and lifestyle control. Psychophysiological insomnia  -     traZODone (DESYREL) 100 MG tablet; Take 1/2-1 tablets by mouth nightly  The sleep apnea will worsen with weight gain. Sleep apnea if untreated causes the blood pressure to go up, causing more swelling in the ankles, causes you to wake up and have to urinate more often, trends towards diabetes by causing weight gain, causes more problems with depression and anxiety. Sleep apnea is strongly associated with panic attacks. Bipolar 1 disorder, mixed, full remission (HCC)  -     ziprasidone (GEODON) 40 MG capsule; Take 1 capsule by mouth daily Take with 60 mg tab at dinner  -     ziprasidone (GEODON) 60 MG capsule; Take 1 capsule by mouth 2 times daily (with meals) Take a 60 mg with a 40 mg at dinner  Bipolar episodes are more common with depression. Depression is more common during the pandemic. Major depression in complete remission (HCC)  -     ziprasidone (GEODON) 40 MG capsule; Take 1 capsule by mouth daily Take with 60 mg tab at dinner  -     ziprasidone (GEODON) 60 MG capsule; Take 1 capsule by mouth 2 times daily (with meals) Take a 60 mg with a 40 mg at dinner  Depression is very common during the pandemic. Exercise can be very helpful. Use your CPAP to help maintain mood. Extrapyramidal movement disorder, drug-induced  -     trihexyphenidyl (ARTANE) 2 MG tablet; Take 1 tablet by mouth 2 times daily  Only fair control. Stable. Social anxiety disorder  -     clonazePAM (KLONOPIN) 1 MG tablet; Take 1 tablet by mouth 2 times daily for 90 days. Always look to lifestyle first to manage anxiety. A key part is exercise. Set boundaries for yourself going forward about what is manageable and what is not manageable in terms of social interactions. Perennial allergic rhinitis  -     fexofenadine (ALLEGRA) 180 MG tablet;  Take 1 tablet by mouth daily  -     Good control.  -     Continue meds and lifestyle control. Homocysteinemia ([facility]  -     folic acid (FOLVITE) 955 MCG tablet; Take 1 tablet by mouth daily  Helps prevent heart attacks and strokes. Helps speed healing as well.

## 2020-09-25 NOTE — PROGRESS NOTES
2020    TELEHEALTH EVALUATION -- Audio/Visual (During - public health emergency)  332  HPI:    Tyrone Cates (:  1980) has requested an audio/video evaluation for the following concern(s):    Essential hypertension  He was 240 pounds in the past.  He feels like he did in the past when he was heavy. He gets winded easily. Current weight 210. He weighs himself on a regular basis daily. Weigh self once a week. Partial symptomatic epilepsy with complex partial seizures, not intractable, without status epilepticus ([address]  He had a seizure like activity a month ago. He took 3 pills and came out of it immediately. He takes it 2 mg when he has an episode. Gastroesophageal reflux disease, esophagitis presence not specified  No problem. No black tarry stools. He is not taking ibuprofen or Aleve. Psychophysiological insomnia  He got the CPAP machine. Sometimes it works nicely. Sometimes he rips it off his face at night. He wakes up and he does not feel refreshed at times. Feels better an hour after he gets up than if he did not use it. He has a humidifier on. He reports puts water in it. Bipolar 1 disorder, mixed, full remission ([address] / Major depression in complete remission ([address]  He is feeling pretty depressed at the moment. For the most part is been pretty good. No side effects with any psychiatric medicines. Extrapyramidal movement disorder, drug-induced  He still has the twitching. It comes and goes. It always happens at some point during the day. Social anxiety disorder  Perennial allergic rhinitis  He is taking Mucinex and Allegra well controlled. Homocysteinemia ([address]  On folic acid 098 g daily. Shoulder pain  Shoulder specialist seen about 7 days ago. No new diagnosis. No new treatments. It was last Friday.   Orthopedic assessment 2020:   53-year-old male with left shoulder scapular winging and dyskinesis  Office Procedures:  Procedures    XR SHOULDER LEFT (MIN 2 VIEWS)   Plan:   -We discussed medications, ice, activity modification. He states he cannot take NSAIDs secondary to gastric issues. Did discuss the possibility of topical agents and that he should check with his primary care doctor  -In addition we will get him into physical therapy to work on scapular stabilization and core  -Ice, activity modification  -Follow-up in 6 weeks he continues have significant symptoms  Bradley Field MD  [id number] partner of [facility] ([facility])    Review of Systems   Neurological: Positive for headaches. Prior to Visit Medications    Medication Sig Taking? Authorizing Provider   amLODIPine (NORVASC) 2.5 MG tablet TAKE 1 TABLET DAILY Yes Lesta Class, DO   traZODone (DESYREL) 100 MG tablet Take 1/2-1 tablets by mouth nightly Yes Lesta Class, DO   gabapentin (NEURONTIN) 400 MG capsule TAKE 4 CAPS BY MOUTH NIGHTLY FOR SLEEP. Yes Lesta Class, DO   omeprazole (PRILOSEC) 20 MG delayed release capsule Take 1 capsule by mouth every morning (before breakfast) Yes Lesta Class, DO   Cholecalciferol (VITAMIN D3) 125 MCG (5000 UT) CAPS Take 1 capsule by mouth daily (with breakfast) Yes Lesta Class, DO   nadolol (CORGARD) 20 MG tablet 1/2 tab a daily by mouth For high blood pressure. Yes Lesta Class, DO   ziprasidone (GEODON) 40 MG capsule Take 1 capsule by mouth daily Take with 60 mg tab at dinner Yes Lesta Class, DO   ziprasidone (GEODON) 60 MG capsule Take 1 capsule by mouth 2 times daily (with meals) Take a 60 mg with a 40 mg at dinner Yes Lesta Class, DO   trihexyphenidyl (ARTANE) 2 MG tablet Take 1 tablet by mouth 2 times daily Yes Lesta Class, DO   lamoTRIgine (LAMICTAL) 200 MG tablet TAKE 2 TABLETS 1 TIME DAILY AT DINNER Yes Lesta Class, DO   clonazePAM (KLONOPIN) 1 MG tablet Take 1 tablet by mouth 2 times daily for 90 days.  Yes Lesta Class, DO   atorvastatin (LIPITOR) 10 MG tablet Take 1 tablet by mouth daily Yes Lesta Class, DO   clonazePAM Grandfather     Coronary Art Dis Maternal Grandfather     Heart Surgery Maternal Grandfather 54        CABG    Other Maternal Grandfather         PAD with amputations    Diabetes Paternal Grandmother     Other Brother         tonsilitis, gall bladder,       PHYSICAL EXAMINATION:  [ INSTRUCTIONS:  \"[x]\" Indicates a positive item  \"[]\" Indicates a negative item  -- DELETE ALL ITEMS NOT EXAMINED]  Vital Signs: (As obtained by patient/caregiver or practitioner observation)    Blood pressure-  Heart rate-    Respiratory rate-    Temperature-  Pulse oximetry-     Constitutional: [x] Appears well-developed and well-nourished [x] No apparent distress      [] Abnormal-   Mental status  [x] Alert and awake  [] Oriented to person/place/time [x]Able to follow commands      Eyes:  EOM    [x]  Normal  [] Abnormal-  Sclera  [x]  Normal  [] Abnormal -         Discharge [x]  None visible  [] Abnormal -    HENT:   [x] Normocephalic, atraumatic. [] Abnormal   [] Mouth/Throat: Mucous membranes are moist.     External Ears [] Normal  [] Abnormal-     Neck: [x] No visualized mass     Pulmonary/Chest: [x] Respiratory effort normal.  [x] No visualized signs of difficulty breathing or respiratory distress        [] Abnormal-      Musculoskeletal:   [x] Normal gait with no signs of ataxia         [] Normal range of motion of neck        [] Abnormal-       Neurological:        [x] No Facial Asymmetry (Cranial nerve 7 motor function) (limited exam to video visit)          [x] No gaze palsy        [] Abnormal-         Skin:        [x] No significant exanthematous lesions or discoloration noted on facial skin         [] Abnormal-            Psychiatric:       [] Normal Affect [] No Hallucinations        [x] Abnormal-slightly depressed.     Other pertinent observable physical exam findings-   Patient-Reported Vitals 9/25/2020   Patient-Reported Weight 205LBS   Patient-Reported Height 5'9\"      BP Readings from Last 3 Encounters:   03/23/20 112/72   02/15/20 117/78   12/03/19 (!) 142/82     Pulse Readings from Last 3 Encounters:   03/23/20 105   02/15/20 79   12/03/19 100     Wt Readings from Last 3 Encounters:   09/18/20 212 lb (96.2 kg)   03/23/20 212 lb (96.2 kg)   02/15/20 185 lb (83.9 kg)     Radiology:     X-rays obtained and reviewed in office:  4 views AP/Grashey/Axillary/Scapular Y of the left shoulder dated 9/18/2020 demonstrate no acute fracture. No dislocation. No major degenerative changes. Normal alignment. No visible bony lesions or loose bodies. ASSESSMENT/PLAN:  411    Patient Instructions   Jef Lopez was seen today for hypertension and seizures. Diagnoses and all orders for this visit:    Essential hypertension  -     amLODIPine (NORVASC) 2.5 MG tablet; TAKE 1 TABLET DAILY  Buy a home blood pressure cuff so you can monitor how your blood pressure is doing between visits every 2 weeks or so and if you are having symptoms. Record your blood pressures on paper or in his smart phone. Weigh yourself weekly. Record those weights on paper or in your smart phone and bring them to the office at each visit or have them available at video visits. You are due for blood work to monitor your effects of blood pressure medicine, seizure medicines, and cholesterol. Please schedule a morning in person visit so that we can draw your blood and examine you. Partial symptomatic epilepsy with complex partial seizures, not intractable, without status epilepticus (HCC)  -     gabapentin (NEURONTIN) 400 MG capsule; TAKE 4 CAPSULES BY MOUTH NIGHTLY FOR SLEEP  -     clonazePAM (KLONOPIN) 1 MG tablet; Take 1 tablet by mouth 2 times daily for 90 days. -     clonazePAM (KLONOPIN) 2 MG tablet; Take 1 tablet by mouth 2 times daily as needed (SEIZURES) for up to 196 days. TAKE TO ABORT SEIZURES  Fair to good control with only one seizure episode since last seen. -     Continue meds and lifestyle control.      Gastroesophageal reflux disease, esophagitis presence not specified  -     omeprazole (PRILOSEC) 20 MG delayed release capsule; Take 1 capsule by mouth every morning (before breakfast)  -     Good control now. Symptoms will worsen with weight gain. Cut back on portion sizes. Would recommend a heart healthy cookbook. You also may want to consider a diabetic cookbook since you have a strong family history of diabetes you are at high risk to develop diabetes if you gain weight. Things like heart problems, strokes, amputations, kidney problems and vision loss are very common in diabetes. Diabetes however it is avoidable with a exercise, good diet, slow weight loss, and frequent small regular meals.  -     Continue meds and lifestyle control. Psychophysiological insomnia  -     traZODone (DESYREL) 100 MG tablet; Take 1/2-1 tablets by mouth nightly  The sleep apnea will worsen with weight gain. Sleep apnea if untreated causes the blood pressure to go up, causing more swelling in the ankles, causes you to wake up and have to urinate more often, trends towards diabetes by causing weight gain, causes more problems with depression and anxiety. Sleep apnea is strongly associated with panic attacks. Bipolar 1 disorder, mixed, full remission (HCC)  -     ziprasidone (GEODON) 40 MG capsule; Take 1 capsule by mouth daily Take with 60 mg tab at dinner  -     ziprasidone (GEODON) 60 MG capsule; Take 1 capsule by mouth 2 times daily (with meals) Take a 60 mg with a 40 mg at dinner  Bipolar episodes are more common with depression. Depression is more common during the pandemic. Major depression in complete remission (HCC)  -     ziprasidone (GEODON) 40 MG capsule; Take 1 capsule by mouth daily Take with 60 mg tab at dinner  -     ziprasidone (GEODON) 60 MG capsule; Take 1 capsule by mouth 2 times daily (with meals) Take a 60 mg with a 40 mg at dinner  Depression is very common during the pandemic. Exercise can be very helpful.   Use your CPAP to help maintain mood. Extrapyramidal movement disorder, drug-induced  -     trihexyphenidyl (ARTANE) 2 MG tablet; Take 1 tablet by mouth 2 times daily  Only fair control. Stable. Social anxiety disorder  -     clonazePAM (KLONOPIN) 1 MG tablet; Take 1 tablet by mouth 2 times daily for 90 days. Always look to lifestyle first to manage anxiety. A key part is exercise. Set boundaries for yourself going forward about what is manageable and what is not manageable in terms of social interactions. Perennial allergic rhinitis  -     fexofenadine (ALLEGRA) 180 MG tablet; Take 1 tablet by mouth daily  -     Good control.  -     Continue meds and lifestyle control. Homocysteinemia ([facility]  -     folic acid (FOLVITE) 036 MCG tablet; Take 1 tablet by mouth daily  Helps prevent heart attacks and strokes. Helps speed healing as well. Return in about 3 months (around 12/25/2020) for Hypertension, Cholesterol, Anxiety, GERD, weight gain, RUDDY. Visit should be in person in the a.m. with fasting labs. Alicia Nunez is a 47 y.o. male being evaluated by a Virtual Visit (video visit) encounter to address concerns as mentioned above. A caregiver was present when appropriate. Due to this being a TeleHealth encounter (During [place] public health emergency), evaluation of the following organ systems was limited: Vitals/Constitutional/EENT/Resp/CV/GI//MS/Neuro/Skin/Heme-Lymph-Imm. Pursuant to the emergency declaration under the [facility], [address] [place] [address] authority and the "[employer] and [employer] Act, this Virtual Visit was conducted with patient's (and/or legal guardian's) consent, to reduce the patient's risk of exposure to COVID-19 and provide necessary medical care.   The patient (and/or legal guardian) has also been advised to contact this office for worsening conditions or problems, and seek emergency medical treatment and/or call 911 if deemed necessary. Patient identification was verified at the start of the visit: Yes    Total time spent on this encounter: 39 minutes of which more than one half was spent counseling. Services were provided through a video synchronous discussion virtually to substitute for in-person clinic visit. Patient and provider were located at their individual homes. --Yary Mckeon DO on 9/25/2020 at 3:33 PM    An electronic signature was used to authenticate this note.

## 2020-09-25 NOTE — FLOWSHEET NOTE
[facility] [facility], [facility], [address] [facility] [address]  Phone: [phone]   Fax:     [phone]    Physical Therapy  Cancellation/No-show Note  Patient Name:  Leonarda Lopez  :  1980   Date:  2020    Cancelled visits to date: 3  No-shows to date: 0    For today's appointment patient:  [x]  Cancelled  []  Rescheduled appointment  []  No-show     Reason given by patient:  []  Patient ill  []  Conflicting appointment  [x]  No transportation    []  Conflict with work  []  No reason given  []  Other:     Comments:      Phone call information:   []  Phone call made today to patient at _ time at number provided:      []  Patient answered, conversation as follows:    []  Patient did not answer, message left as follows:  []  Phone call not made today  [x]  Phone call not needed - pt contacted us to cancel and provided reason for cancellation.      Electronically signed by:  Lacey Sosa PT

## 2020-10-21 RX ORDER — CLONAZEPAM 2 MG/1
2 TABLET ORAL 2 TIMES DAILY PRN
Qty: 60 TABLET | Refills: 0 | OUTPATIENT
Start: 2020-10-21 | End: 2021-05-05

## 2020-10-21 NOTE — TELEPHONE ENCOUNTER
Patient he just got a full bottle. He does not need refill. Put message on the prescription refusal that the prescription should not be auto refill for the clonazepam 2 mg - patient uses it as needed.

## 2021-01-06 DIAGNOSIS — G40.209 PARTIAL SYMPTOMATIC EPILEPSY WITH COMPLEX PARTIAL SEIZURES, NOT INTRACTABLE, WITHOUT STATUS EPILEPTICUS (HCC): Chronic | ICD-10-CM

## 2021-01-06 DIAGNOSIS — F40.10 SOCIAL ANXIETY DISORDER: ICD-10-CM

## 2021-01-08 ENCOUNTER — VIRTUAL VISIT (OUTPATIENT)
Dept: FAMILY MEDICINE CLINIC | Age: 41
End: 2021-01-08
Payer: COMMERCIAL

## 2021-01-08 DIAGNOSIS — K21.9 GASTROESOPHAGEAL REFLUX DISEASE, UNSPECIFIED WHETHER ESOPHAGITIS PRESENT: ICD-10-CM

## 2021-01-08 DIAGNOSIS — F31.78 BIPOLAR 1 DISORDER, MIXED, FULL REMISSION (HCC): Primary | ICD-10-CM

## 2021-01-08 DIAGNOSIS — F10.10 ALCOHOL ABUSE: ICD-10-CM

## 2021-01-08 DIAGNOSIS — F32.5 MAJOR DEPRESSION IN COMPLETE REMISSION (HCC): ICD-10-CM

## 2021-01-08 DIAGNOSIS — G40.209 PARTIAL SYMPTOMATIC EPILEPSY WITH COMPLEX PARTIAL SEIZURES, NOT INTRACTABLE, WITHOUT STATUS EPILEPTICUS (HCC): Chronic | ICD-10-CM

## 2021-01-08 DIAGNOSIS — G25.89 EXTRAPYRAMIDAL MOVEMENT DISORDER, DRUG-INDUCED: ICD-10-CM

## 2021-01-08 DIAGNOSIS — I10 ESSENTIAL HYPERTENSION: ICD-10-CM

## 2021-01-08 DIAGNOSIS — F51.04 PSYCHOPHYSIOLOGICAL INSOMNIA: ICD-10-CM

## 2021-01-08 DIAGNOSIS — F40.10 SOCIAL ANXIETY DISORDER: ICD-10-CM

## 2021-01-08 DIAGNOSIS — T50.905A EXTRAPYRAMIDAL MOVEMENT DISORDER, DRUG-INDUCED: ICD-10-CM

## 2021-01-08 PROCEDURE — G8427 DOCREV CUR MEDS BY ELIG CLIN: HCPCS | Performed by: FAMILY MEDICINE

## 2021-01-08 PROCEDURE — 99214 OFFICE O/P EST MOD 30 MIN: CPT | Performed by: FAMILY MEDICINE

## 2021-01-08 RX ORDER — NADOLOL 20 MG/1
TABLET ORAL
Qty: 45 TABLET | Refills: 1 | Status: SHIPPED | OUTPATIENT
Start: 2021-01-08 | End: 2021-04-01 | Stop reason: SDUPTHER

## 2021-01-08 RX ORDER — CLONAZEPAM 2 MG/1
2 TABLET ORAL 2 TIMES DAILY PRN
Qty: 60 TABLET | Refills: 1 | Status: SHIPPED | OUTPATIENT
Start: 2021-01-08 | End: 2021-03-11

## 2021-01-08 RX ORDER — CLONAZEPAM 1 MG/1
1 TABLET ORAL 2 TIMES DAILY
Qty: 60 TABLET | Refills: 2 | Status: SHIPPED | OUTPATIENT
Start: 2021-01-08 | End: 2021-04-01 | Stop reason: SDUPTHER

## 2021-01-08 RX ORDER — ZIPRASIDONE HYDROCHLORIDE 40 MG/1
40 CAPSULE ORAL DAILY
Qty: 30 CAPSULE | Refills: 2 | Status: SHIPPED | OUTPATIENT
Start: 2021-01-08 | End: 2021-04-01 | Stop reason: SDUPTHER

## 2021-01-08 RX ORDER — LAMOTRIGINE 200 MG/1
TABLET ORAL
Qty: 60 TABLET | Refills: 5 | Status: SHIPPED | OUTPATIENT
Start: 2021-01-08 | End: 2021-04-01 | Stop reason: SDUPTHER

## 2021-01-08 RX ORDER — AMLODIPINE BESYLATE 2.5 MG/1
TABLET ORAL
Qty: 30 TABLET | Refills: 2 | Status: SHIPPED | OUTPATIENT
Start: 2021-01-08 | End: 2021-04-01 | Stop reason: SDUPTHER

## 2021-01-08 RX ORDER — TRIHEXYPHENIDYL HYDROCHLORIDE 2 MG/1
2 TABLET ORAL 2 TIMES DAILY
Qty: 60 TABLET | Refills: 2 | Status: SHIPPED | OUTPATIENT
Start: 2021-01-08 | End: 2021-04-01 | Stop reason: SDUPTHER

## 2021-01-08 RX ORDER — TRAZODONE HYDROCHLORIDE 100 MG/1
TABLET ORAL
Qty: 30 TABLET | Refills: 1 | Status: SHIPPED | OUTPATIENT
Start: 2021-01-08 | End: 2021-03-10

## 2021-01-08 RX ORDER — OMEPRAZOLE 20 MG/1
20 CAPSULE, DELAYED RELEASE ORAL
Qty: 30 CAPSULE | Refills: 2 | Status: SHIPPED | OUTPATIENT
Start: 2021-01-08 | End: 2021-04-01 | Stop reason: SDUPTHER

## 2021-01-08 RX ORDER — ZIPRASIDONE HYDROCHLORIDE 60 MG/1
60 CAPSULE ORAL 2 TIMES DAILY WITH MEALS
Qty: 60 CAPSULE | Refills: 2 | Status: SHIPPED | OUTPATIENT
Start: 2021-01-08 | End: 2021-04-01 | Stop reason: SDUPTHER

## 2021-01-08 SDOH — HEALTH STABILITY: MENTAL HEALTH: HOW OFTEN DO YOU HAVE A DRINK CONTAINING ALCOHOL?: NOT ASKED

## 2021-01-08 NOTE — PROGRESS NOTES
Chip Trejo (:  1980) is a 47 y.o. male,New patient, here for evaluation of the following chief complaint(s): Other ([facility] ROUTINE FOLLOW UP FOR KLONOPIN)      ASSESSMENT/PLAN:  144    - Counseling More Than 50% of the 32 min Appointment Time     Patient Instructions   Macey Sow was seen today for other. Diagnoses and all orders for this visit:    Bipolar 1 disorder, mixed, full remission ([facility] [address]  -     ziprasidone (GEODON) 40 MG capsule; Take 1 capsule by mouth daily Take with 60 mg tab at dinner  -     ziprasidone (GEODON) 60 MG capsule; Take 1 capsule by mouth 2 times daily (with meals) Take a 60 mg with a 40 mg at dinner  -     lamoTRIgine (LAMICTAL) 200 MG tablet; TAKE 2 TABLETS 1 TIME DAILY AT DINNER  Poorly controlled. Recommend we consider psychiatry. We have an psychiatric nurse practitioner in our office who can work with patients. She can also do video visits. If you decide to do this and was too quickly as it takes up to 6 weeks to get in prior to the pandemic and may be better or worse at this point. Major depression in complete remission (HCC)  -     ziprasidone (GEODON) 40 MG capsule; Take 1 capsule by mouth daily Take with 60 mg tab at dinner  -     ziprasidone (GEODON) 60 MG capsule; Take 1 capsule by mouth 2 times daily (with meals) Take a 60 mg with a 40 mg at dinner  Worsened considerably. Running out of medicines no doubt made things worse. If you are not feeling immediately better call or send us a [employer] message. Alcohol abuse  Start by cutting back at least 1 beer every 3 days until you are down to 2 beers nightly. The cost alone is going to be a major stressor on you. You will need to take deep as no program will work until you have motivation to work with the program.  There are a lot of AA programs out there that are secular rather than Yazidi. Social workers are people who help you when you are down and out.   [facility] and [facility] both have social workers. We will contact you would depend on whether or not you live in the city. Getting help early can help keep you from living on the street. They can help with finding AA programs for you and even a sponsor, jobs, places to live in your budget etc. I have also asked someone in the office to see if they can find an [place] [address] program that may be covered through care source. [place] [address] meetings of themselves are always free and you may want to look for the right secular program on your own. You would only need insurance coverage if you were going inpatient. Essential  hypertension  -     amLODIPine (NORVASC) 2.5 MG tablet; TAKE 1 TABLET DAILY  -     nadolol (CORGARD) 20 MG tablet; 1/2 tab a daily by mouth For high blood pressure. Past control was good. However if you are drinking heavily and potentially eating a lot of salty food then we do not know where your blood pressure is at. Psychophysiological insomnia  -     traZODone (DESYREL) 100 MG tablet; Take 1/2-1 tablets by mouth nightly  Remember to include physical exercise as part of your insomnia treatment plan. Physical exercise as simple as walking can make huge difference in your ability to sleep. It is recommended that people either walk briskly for 30 minutes 5 days a week or get 10,000 steps a day if walking is the only exercise. Look for ways to accomplish this other than reasons why you cannot. Gastroesophageal reflux disease, unspecified whether esophagitis present  -     omeprazole (PRILOSEC) 20 MG delayed release capsule; Take 1 capsule by mouth every morning (before breakfast)  Alcohol will worsen reflux. Extrapyramidal movement disorder, drug-induced  -     trihexyphenidyl (ARTANE) 2 MG tablet; Take 1 tablet by mouth 2 times daily    Partial symptomatic epilepsy with complex partial seizures, not intractable, without status epilepticus (HCC)  -     clonazePAM (KLONOPIN) 1 MG tablet; Take 1 tablet by mouth 2 times daily for 90 days.   - 02/15/20 185 lb (83.9 kg)     Past Medical History:   Diagnosis Date    Abscess of elbow 2012    right    Alcoholism /alcohol abuse ([address] 1/1/2005    Stopped 2014    Arthritis     Durham's esophagus     Bipolar 1 disorder, mixed, full remission ([address] 1/1/2006    Contusion of wrist, right 04/01/2016    Dislocation of part of left shoulder girdle 01/01/1985    arm pulled out of socket - no medical visit at the time    Erosive gastropathy 03/23/2018    antrum    Essential hypertension 1/1/2006    lost 100 lbs in 1 year    GERD (gastroesophageal reflux disease)     Head injury due to trauma     Frontal lobe trauma causing seizures and shakes.  No workup till after seizures/shakes appeared    Major depression in complete remission ([address] 1/1/2006    while on medicine    Osteomyelitis ([address] 1/1/2003    right tibia    Partial complex seizure disorder without intractable epilepsy ([address] 1/1/2006    fist clenching is a typical pre-seizure activity for him    Psychiatric diagnosis     Saw Dr. Amie Forde  [phone]    Social anxiety disorder 1/1/2006    moderate       Past Surgical History:   Procedure Laterality Date    APPENDECTOMY  5/22/2012    lap appy    ELBOW DEBRIDEMENT Right     MRSA    KNEE SURGERY Right     debridement osteomyelitis    UPPER GASTROINTESTINAL ENDOSCOPY  12/16/2010    UPPER GASTROINTESTINAL ENDOSCOPY  03/23/2018    gastric and esophageal bx, erosive gastropathy    WISDOM TOOTH EXTRACTION         Social History     Socioeconomic History    Marital status: Single     Spouse name: Not on file    Number of children: 0    Years of education: 15    Highest education level: Not on file   Occupational History    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  Have almost quit (nicorette) 3/13/17. Restarted 18 < 1 PPD. .19. 1-1.5 PPD. Plans on quit. 20. He is at 1 pack/day. Substance and Sexual Activity    Alcohol use: Yes     Alcohol/week: 6.0 standard drinks     Types: 6 Cans of beer per week     Comment: Occasional 3-4 beers. Has not drank at all for 4 weeks. doesn't want to lose GF.  Drug use: Yes     Types: Marijuana    Sexual activity: Never   Lifestyle    Physical activity     Days per week: Not on file     Minutes per session: Not on file    Stress: Not on file   Relationships    Social connections     Talks on phone: Not on file     Gets together: Not on file     Attends Hinduism service: Not on file     Active member of club or organization: Not on file     Attends meetings of clubs or organizations: Not on file     Relationship status: Not on file    Intimate partner violence     Fear of current or ex partner: Not on file     Emotionally abused: Not on file     Physically abused: Not on file     Forced sexual activity: Not on file   Other Topics Concern    Not on file   Social History Narrative    Only exercise is work. 17. 9//17. 4//18. 7/23/18. 10/22/18. 1//. 5//. 8//.11/. Less time shoeing horse. Only exercises work. 20. Family History   Problem Relation Age of Onset    Diabetes Mother     Rheum Arthritis Mother     Depression Mother     Hypertension Mother     High Cholesterol Mother     Osteoarthritis Mother         Mother had a knee replacement.     Obesity Mother     Other Mother         varicose veins    Diabetes Father     High Blood Pressure Father     High Cholesterol Father     Other Father interstitial cystitis, DDD C spine    Heart Disease Father         TACHYCARDIA    Mental Illness Brother         schizophrenia - dissociated    Obesity Brother     Diabetes Brother     Liver Disease Brother         fatty liver    Obesity Brother     Other Brother         ervin    Stroke Maternal Grandmother     Diabetes Maternal Grandfather     Coronary Art Dis Maternal Grandfather     Heart Surgery Maternal Grandfather 54        CABG    Other Maternal Grandfather         PAD with amputations    Diabetes Paternal Grandmother     Other Brother         tonsilitis, gall bladder,       Allergies   Allergen Reactions    Banana Hives and Itching    Iodides      Other reaction(s): GI Intolerance    Ancef [Cefazolin Sodium] Rash    Iodine Nausea And Vomiting     WITH IODINATED CONTRAST    Shellfish-Derived Products Nausea And Vomiting       Current Outpatient Medications   Medication Sig Dispense Refill    amLODIPine (NORVASC) 2.5 MG tablet TAKE 1 TABLET DAILY 30 tablet 2    traZODone (DESYREL) 100 MG tablet Take 1/2-1 tablets by mouth nightly 30 tablet 1    omeprazole (PRILOSEC) 20 MG delayed release capsule Take 1 capsule by mouth every morning (before breakfast) 30 capsule 2    ziprasidone (GEODON) 40 MG capsule Take 1 capsule by mouth daily Take with 60 mg tab at dinner 30 capsule 2    ziprasidone (GEODON) 60 MG capsule Take 1 capsule by mouth 2 times daily (with meals) Take a 60 mg with a 40 mg at dinner 60 capsule 2    trihexyphenidyl (ARTANE) 2 MG tablet Take 1 tablet by mouth 2 times daily 60 tablet 2    clonazePAM (KLONOPIN) 1 MG tablet Take 1 tablet by mouth 2 times daily for 90 days.  60 tablet 2    fexofenadine (ALLEGRA) 180 MG tablet Take 1 tablet by mouth daily 30 tablet 11    folic acid (FOLVITE) 823 MCG tablet Take 1 tablet by mouth daily 30 tablet 11    Cholecalciferol (VITAMIN D3) 125 MCG (5000 UT) CAPS Take 1 capsule by mouth daily (with breakfast) 30 capsule 11    nadolol (CORGARD) 20 MG tablet 1/2 tab a daily by mouth For high blood pressure. 45 tablet 1    lamoTRIgine (LAMICTAL) 200 MG tablet TAKE 2 TABLETS 1 TIME DAILY AT DINNER 60 tablet 5    atorvastatin (LIPITOR) 10 MG tablet Take 1 tablet by mouth daily 30 tablet 10    Melatonin 5 MG TABS tablet Take 1 tablet by mouth nightly      therapeutic multivitamin-minerals (THERAGRAN-M) tablet Take 1 tablet by mouth daily.  gabapentin (NEURONTIN) 400 MG capsule TAKE 4 CAPSULES BY MOUTH NIGHTLY FOR SLEEP 120 capsule 2    clonazePAM (KLONOPIN) 2 MG tablet Take 1 tablet by mouth 2 times daily as needed (SEIZURES) for up to 196 days. TAKE TO ABORT SEIZURES (Patient not taking: Reported on 1/8/2021) 60 tablet 0    ibuprofen (ADVIL;MOTRIN) 800 MG tablet Take 1 tablet by mouth 4 times daily as needed for Pain (Patient not taking: Reported on 1/8/2021) 120 tablet 0    acetaminophen (TYLENOL) 500 MG tablet Take 2 tablets by mouth 3 times daily as needed for Pain 180 tablet 0    GLUCOSAMINE-CHONDROITIN ER PO Take by mouth 2 times daily      guaiFENesin (MUCINEX) 600 MG extended release tablet Take 600 mg by mouth daily        No current facility-administered medications for this visit. Physical Exam  Vitals signs and nursing note reviewed. Constitutional:       Appearance: Normal appearance. He is well-developed. He is obese. Eyes:      General: Gaze aligned appropriately. Comments: Patient crying - difficult to tell what is going on with the eyes. Neck:      Musculoskeletal: Neck supple. Trachea: Phonation normal.   Pulmonary:      Effort: Pulmonary effort is normal. No tachypnea, bradypnea, accessory muscle usage, respiratory distress or retractions. Skin:     Coloration: Skin is not ashen, cyanotic, jaundiced, mottled, pale or sallow. Comments: Facial skin only examined. Neurological:      Mental Status: He is alert.    Psychiatric:         Attention and Perception: Attention and perception normal.         Mood and Affect: Mood is depressed. Mood is not anxious or elated. Affect is labile and tearful. Affect is not blunt, angry or inappropriate. Speech: Speech normal.         Behavior: Behavior normal. Behavior is cooperative. Cognition and Memory: Cognition and memory normal.         Judgment: Judgment is impulsive and inappropriate. Comments: Patient feels that his girlfriend broke up with him because his income dropped which it usually does during the winter. He feels like she was more concerned about his money than him. She was doing the scheduling and then started \"bad mouthing\" him to his clients and he lost many clients. He still has some but not many. He does him hard that he found out he had a daughter for 8 years that he did not know about. He wanted to be in contact with his daughter but the mother would not allow him. Explained to him that some things are just not fixable. He is intermittently breaking into sobs. COVID-19 negative 1/5/2021. On this date 01/09/21 I have spent 32 minutes reviewing previous notes, test results and face to face with the patient discussing the diagnosis and importance of compliance with the treatment plan as well as documenting on the day of the visit. [place] Or[place]ga is a 47 y.o. male being evaluated by a Virtual Visit (video visit) encounter to address concerns as mentioned above. A caregiver was present when appropriate. Due to this being a TeleHealth encounter (During [facility] public health emergency), evaluation of the following organ systems was limited: Vitals/Constitutional/EENT/Resp/CV/GI//MS/Neuro/Skin/Heme-Lymph-Imm.   Pursuant to the emergency declaration under the [facility] waiver authority and the [employer] Act, this Virtual Visit was conducted with patient's (and/or legal guardian's) consent, to reduce the

## 2021-01-09 NOTE — PATIENT INSTRUCTIONS
Brenda De Santiago was seen today for other. Diagnoses and all orders for this visit:    Bipolar 1 disorder, mixed, full remission ([facility] [address]  -     ziprasidone (GEODON) 40 MG capsule; Take 1 capsule by mouth daily Take with 60 mg tab at dinner  -     ziprasidone (GEODON) 60 MG capsule; Take 1 capsule by mouth 2 times daily (with meals) Take a 60 mg with a 40 mg at dinner  -     lamoTRIgine (LAMICTAL) 200 MG tablet; TAKE 2 TABLETS 1 TIME DAILY AT DINNER  Poorly controlled. Recommend we consider psychiatry. We have an psychiatric nurse practitioner in our office who can work with patients. She can also do video visits. If you decide to do this and was too quickly as it takes up to 6 weeks to get in prior to the pandemic and may be better or worse at this point. Major depression in complete remission (HCC)  -     ziprasidone (GEODON) 40 MG capsule; Take 1 capsule by mouth daily Take with 60 mg tab at dinner  -     ziprasidone (GEODON) 60 MG capsule; Take 1 capsule by mouth 2 times daily (with meals) Take a 60 mg with a 40 mg at dinner  Worsened considerably. Running out of medicines no doubt made things worse. If you are not feeling immediately better call or send us a [employer] message. Alcohol abuse  Start by cutting back at least 1 beer every 3 days until you are down to 2 beers nightly. The cost alone is going to be a major stressor on you. You will need to take deep as no program will work until you have motivation to work with the program.  There are a lot of AA programs out there that are secular rather than Anabaptism. Social workers are people who help you when you are down and out. [facility] and [facility] both have social workers. We will contact you would depend on whether or not you live in the city. Getting help early can help keep you from living on the street.   They can help with finding AA programs for you and even a sponsor, jobs, places to live in your budget etc. I have also asked someone in the office to see if they can find an [place] [address] program that may be covered through care source. [place] [address] meetings of themselves are always free and you may want to look for the right secular program on your own. You would only need insurance coverage if you were going inpatient. Essential  hypertension  -     amLODIPine (NORVASC) 2.5 MG tablet; TAKE 1 TABLET DAILY  -     nadolol (CORGARD) 20 MG tablet; 1/2 tab a daily by mouth For high blood pressure. Past control was good. However if you are drinking heavily and potentially eating a lot of salty food then we do not know where your blood pressure is at. Psychophysiological insomnia  -     traZODone (DESYREL) 100 MG tablet; Take 1/2-1 tablets by mouth nightly  Remember to include physical exercise as part of your insomnia treatment plan. Physical exercise as simple as walking can make huge difference in your ability to sleep. It is recommended that people either walk briskly for 30 minutes 5 days a week or get 10,000 steps a day if walking is the only exercise. Look for ways to accomplish this other than reasons why you cannot. Gastroesophageal reflux disease, unspecified whether esophagitis present  -     omeprazole (PRILOSEC) 20 MG delayed release capsule; Take 1 capsule by mouth every morning (before breakfast)  Alcohol will worsen reflux. Extrapyramidal movement disorder, drug-induced  -     trihexyphenidyl (ARTANE) 2 MG tablet; Take 1 tablet by mouth 2 times daily    Partial symptomatic epilepsy with complex partial seizures, not intractable, without status epilepticus (HCC)  -     clonazePAM (KLONOPIN) 1 MG tablet; Take 1 tablet by mouth 2 times daily for 90 days. -     clonazePAM (KLONOPIN) 2 MG tablet; Take 1 tablet by mouth 2 times daily as needed (SEIZURES) for up to 91 days. TAKE TO ABORT SEIZURES  Keep your use of clonazepam at or below the level that we have written the prescriptions for.     Social anxiety disorder -     clonazePAM (KLONOPIN) 1 MG tablet; Take 1 tablet by mouth 2 times daily for 90 days. Exercise helps anxiety greatly.

## 2021-01-11 DIAGNOSIS — G40.209 PARTIAL SYMPTOMATIC EPILEPSY WITH COMPLEX PARTIAL SEIZURES, NOT INTRACTABLE, WITHOUT STATUS EPILEPTICUS (HCC): Chronic | ICD-10-CM

## 2021-01-11 RX ORDER — GABAPENTIN 400 MG/1
CAPSULE ORAL
Qty: 120 CAPSULE | Refills: 0 | Status: SHIPPED | OUTPATIENT
Start: 2021-01-11 | End: 2021-03-10

## 2021-02-10 ENCOUNTER — TELEPHONE (OUTPATIENT)
Dept: FAMILY MEDICINE CLINIC | Age: 41
End: 2021-02-10

## 2021-02-10 NOTE — LETTER
Latrell Pratergy  [address] [facility]  [address] [place] [address]  Phone: [phone]  Fax:  [facility],  [address], DO        February 10, 2021     Patient: Cyrus Cali   YOB: 1980   Date of Visit: 2/10/2021       To Whom It May Concern: It is my medical opinion that Natividad Meigs may return to full duty immediately with no restrictions on Sunday Sunday, 2/14/2021. If you have any questions or concerns, please don't hesitate to call.     Sincerely,    Wilfredo Rose DO

## 2021-02-10 NOTE — TELEPHONE ENCOUNTER
He was beaten up by a horse ( at his other job)  -- he needs a note stating that he can go back to work on Sunday at [employer]. [email or web address]    Pt @  [phone]

## 2021-02-11 NOTE — TELEPHONE ENCOUNTER
Called patient. 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 He was very sore the next day. He missed 1 day from work yesterday 2/9/2021. He does not have to return to work at [employer] until Sunday. He needs a note before then. He thinks that they are concerned that he will file a Workmen's Compensation claim against [employer]. A/P work note    Informed patient we would go ahead and send a note to his email tomorrow however in the future he will need to do a video visit for that. Discussed that it is unlikely there is concerned about a Worker's Compensation claim as the they just want a body there when you work. Most jobs will require this work note if you miss work. Obviously the second job may be more lucrative than the first.  You just have to make sure that you can balance the 2.

## 2021-03-10 DIAGNOSIS — G40.209 PARTIAL SYMPTOMATIC EPILEPSY WITH COMPLEX PARTIAL SEIZURES, NOT INTRACTABLE, WITHOUT STATUS EPILEPTICUS (HCC): Chronic | ICD-10-CM

## 2021-03-10 DIAGNOSIS — F51.04 PSYCHOPHYSIOLOGICAL INSOMNIA: ICD-10-CM

## 2021-03-10 RX ORDER — GABAPENTIN 400 MG/1
CAPSULE ORAL
Qty: 120 CAPSULE | Refills: 0 | Status: SHIPPED | OUTPATIENT
Start: 2021-03-10 | End: 2021-04-01 | Stop reason: SDUPTHER

## 2021-03-10 RX ORDER — TRAZODONE HYDROCHLORIDE 100 MG/1
TABLET ORAL
Qty: 30 TABLET | Refills: 0 | Status: SHIPPED | OUTPATIENT
Start: 2021-03-10 | End: 2022-01-07 | Stop reason: SDUPTHER

## 2021-03-11 DIAGNOSIS — G40.209 PARTIAL SYMPTOMATIC EPILEPSY WITH COMPLEX PARTIAL SEIZURES, NOT INTRACTABLE, WITHOUT STATUS EPILEPTICUS (HCC): Chronic | ICD-10-CM

## 2021-03-11 RX ORDER — CLONAZEPAM 2 MG/1
2 TABLET ORAL 2 TIMES DAILY PRN
Qty: 60 TABLET | Refills: 0 | Status: SHIPPED | OUTPATIENT
Start: 2021-03-11 | End: 2021-08-04 | Stop reason: SDUPTHER

## 2021-04-01 ENCOUNTER — VIRTUAL VISIT (OUTPATIENT)
Dept: FAMILY MEDICINE CLINIC | Age: 41
End: 2021-04-01
Payer: COMMERCIAL

## 2021-04-01 DIAGNOSIS — M95.8 WINGED SCAPULA OF LEFT SIDE: ICD-10-CM

## 2021-04-01 DIAGNOSIS — F32.5 MAJOR DEPRESSION IN COMPLETE REMISSION (HCC): ICD-10-CM

## 2021-04-01 DIAGNOSIS — M19.012 OSTEOARTHRITIS OF LEFT AC (ACROMIOCLAVICULAR) JOINT: ICD-10-CM

## 2021-04-01 DIAGNOSIS — E78.00 HYPERCHOLESTEROLEMIA: ICD-10-CM

## 2021-04-01 DIAGNOSIS — E55.9 VITAMIN D DEFICIENCY: ICD-10-CM

## 2021-04-01 DIAGNOSIS — F40.10 SOCIAL ANXIETY DISORDER: ICD-10-CM

## 2021-04-01 DIAGNOSIS — K21.9 GASTROESOPHAGEAL REFLUX DISEASE, UNSPECIFIED WHETHER ESOPHAGITIS PRESENT: ICD-10-CM

## 2021-04-01 DIAGNOSIS — G40.209 PARTIAL SYMPTOMATIC EPILEPSY WITH COMPLEX PARTIAL SEIZURES, NOT INTRACTABLE, WITHOUT STATUS EPILEPTICUS (HCC): Primary | Chronic | ICD-10-CM

## 2021-04-01 DIAGNOSIS — F31.78 BIPOLAR 1 DISORDER, MIXED, FULL REMISSION (HCC): ICD-10-CM

## 2021-04-01 DIAGNOSIS — G25.89 EXTRAPYRAMIDAL MOVEMENT DISORDER, DRUG-INDUCED: ICD-10-CM

## 2021-04-01 DIAGNOSIS — Z71.89 EDUCATED ABOUT COVID-19 VIRUS INFECTION: ICD-10-CM

## 2021-04-01 DIAGNOSIS — I10 ESSENTIAL HYPERTENSION: ICD-10-CM

## 2021-04-01 DIAGNOSIS — T50.905A EXTRAPYRAMIDAL MOVEMENT DISORDER, DRUG-INDUCED: ICD-10-CM

## 2021-04-01 DIAGNOSIS — Z51.81 MEDICATION MONITORING ENCOUNTER: ICD-10-CM

## 2021-04-01 PROCEDURE — 99214 OFFICE O/P EST MOD 30 MIN: CPT | Performed by: FAMILY MEDICINE

## 2021-04-01 PROCEDURE — G8417 CALC BMI ABV UP PARAM F/U: HCPCS | Performed by: FAMILY MEDICINE

## 2021-04-01 PROCEDURE — 4004F PT TOBACCO SCREEN RCVD TLK: CPT | Performed by: FAMILY MEDICINE

## 2021-04-01 PROCEDURE — G8427 DOCREV CUR MEDS BY ELIG CLIN: HCPCS | Performed by: FAMILY MEDICINE

## 2021-04-01 RX ORDER — CLONAZEPAM 1 MG/1
1 TABLET ORAL 2 TIMES DAILY
Qty: 60 TABLET | Refills: 2 | Status: SHIPPED | OUTPATIENT
Start: 2021-04-01 | End: 2021-08-04 | Stop reason: SDUPTHER

## 2021-04-01 RX ORDER — OMEPRAZOLE 20 MG/1
20 CAPSULE, DELAYED RELEASE ORAL
Qty: 30 CAPSULE | Refills: 2 | Status: SHIPPED | OUTPATIENT
Start: 2021-04-01 | End: 2021-08-04 | Stop reason: SDUPTHER

## 2021-04-01 RX ORDER — GABAPENTIN 400 MG/1
CAPSULE ORAL
Qty: 120 CAPSULE | Refills: 2 | Status: SHIPPED | OUTPATIENT
Start: 2021-04-01 | End: 2021-08-04 | Stop reason: SDUPTHER

## 2021-04-01 RX ORDER — LAMOTRIGINE 200 MG/1
TABLET ORAL
Qty: 60 TABLET | Refills: 5 | Status: SHIPPED | OUTPATIENT
Start: 2021-04-01 | End: 2021-08-04 | Stop reason: SDUPTHER

## 2021-04-01 RX ORDER — TRIHEXYPHENIDYL HYDROCHLORIDE 2 MG/1
2 TABLET ORAL 2 TIMES DAILY
Qty: 60 TABLET | Refills: 2 | Status: SHIPPED | OUTPATIENT
Start: 2021-04-01 | End: 2021-04-21

## 2021-04-01 RX ORDER — ZIPRASIDONE HYDROCHLORIDE 40 MG/1
40 CAPSULE ORAL DAILY
Qty: 30 CAPSULE | Refills: 2 | Status: SHIPPED | OUTPATIENT
Start: 2021-04-01 | End: 2021-07-15

## 2021-04-01 RX ORDER — AMLODIPINE BESYLATE 2.5 MG/1
TABLET ORAL
Qty: 30 TABLET | Refills: 2 | Status: SHIPPED | OUTPATIENT
Start: 2021-04-01 | End: 2021-08-04 | Stop reason: SDUPTHER

## 2021-04-01 RX ORDER — NADOLOL 20 MG/1
TABLET ORAL
Qty: 45 TABLET | Refills: 1 | Status: SHIPPED | OUTPATIENT
Start: 2021-04-01 | End: 2021-08-04 | Stop reason: SDUPTHER

## 2021-04-01 RX ORDER — ZIPRASIDONE HYDROCHLORIDE 60 MG/1
60 CAPSULE ORAL 2 TIMES DAILY WITH MEALS
Qty: 60 CAPSULE | Refills: 2 | Status: SHIPPED | OUTPATIENT
Start: 2021-04-01 | End: 2021-08-04 | Stop reason: SDUPTHER

## 2021-04-01 ASSESSMENT — PATIENT HEALTH QUESTIONNAIRE - PHQ9: SUM OF ALL RESPONSES TO PHQ QUESTIONS 1-9: 1

## 2021-04-01 NOTE — PROGRESS NOTES
Karely Magdaleno (:  1980) is a 47 y.o. male,Established patient, here for evaluation of the following chief complaint(s): Other (DEPRESSION SCRN DUE) and Anxiety (3 MONTH FOLLOW UP ON ANXIETY)    ASSESSMENT/PLAN:  502    Patient Instructions     Read Jamir was seen today for other and anxiety. Diagnoses and all orders for this visit:    Partial symptomatic epilepsy with complex partial seizures, not intractable, without status epilepticus (HCC)  -     gabapentin (NEURONTIN) 400 MG capsule; Take 4 capsules nightly for sleep  -     clonazePAM (KLONOPIN) 1 MG tablet; Take 1 tablet by mouth 2 times daily for 90 days.  -     COMPREHENSIVE METABOLIC PANEL; Future  -     MAGNESIUM; Future  -     Gamma GT; Future  Get the fasting blood work as soon as possible. They have been ordered for Monday, 2021 or thereafter approximately. You should going get these first thing in the morning before you eat breakfast and before have coffee if it has creamer or sugar in it. -     Good control.  -     Continue meds and lifestyle control. Social anxiety disorder  -     clonazePAM (KLONOPIN) 1 MG tablet; Take 1 tablet by mouth 2 times daily for 90 days. Work on deep breathing exercises. Try 4 deep breaths in followed by not breathing for the count of 4. Then repeat while monitoring your pulse to watch your pulse decrease. Bipolar 1 disorder, mixed, full remission (HCC)  -     ziprasidone (GEODON) 40 MG capsule; Take 1 capsule by mouth daily Take with 60 mg tab at dinner  -     ziprasidone (GEODON) 60 MG capsule; Take 1 capsule by mouth 2 times daily (with meals) Take a 60 mg with a 40 mg at dinner  -     lamoTRIgine (LAMICTAL) 200 MG tablet; TAKE 2 TABLETS 1 TIME DAILY AT DINNER  Work on decreasing alcohol. You were doing much better when you were only drinking small amounts occasionally. Major depression in complete remission (HCC)  -     ziprasidone (GEODON) 40 MG capsule;  Take 1 capsule by mouth daily Take with 60 mg tab at dinner  -     ziprasidone (GEODON) 60 MG capsule; Take 1 capsule by mouth 2 times daily (with meals) Take a 60 mg with a 40 mg at dinner  Work on building an emotional support system. It is very hard to find people who we can feel comfortable talking to and at the same time trust that they will tell you if they think your making the wrong decision. Some friends will only tell you what they think you want to hear. If you say you are angry with someone they will agree with you regardless of whether they really think you are right or wrong. A good friend will encourage you when you are right but also tell you when they believe you are making a bad decision. Essential hypertension  -     amLODIPine (NORVASC) 2.5 MG tablet; TAKE 1 TABLET DAILY  -     nadolol (CORGARD) 20 MG tablet; 1/2 tab a daily by mouth For high blood pressure.  -     COMPREHENSIVE METABOLIC PANEL; Future  -     MAGNESIUM; Future  -     MICROALBUMIN / CREATININE URINE RATIO; Future  Your next visit should be in office. We have not had an in office blood pressure check since 3/23/2020. Please schedule an in person visit. When you monitor your blood pressure at home please write the values down and bring them into the office. Blood pressure goal for people 75 years and below is 100-119/79 or less. If blood pressure is more than 139/89 for either number then an increase in medicine is indicated. If you are also diabetic then a blood pressure more 129/79 also means blood pressure medicines should be increased. The doctor should be called if the upper number (systolic blood pressure) is more than 180 once or more than 160 1/3 of the time. Call if the upper number is less than 90 or if less than 100 and you are light headed when you stand up. Call if the lower number (diastolic blood pressure) is more than 100. A much lower bottom number even in the 40's is not usually urgent.     Monitor blood pressure at home about every 2 weeks and/or if you feel:  lightheaded/dizzy, have a headache, chest pain, shortness of breath, rapid heartbeat or heart palpitations. Record all these blood pressures on paper or in your cell phone with a date and time and any symptoms associated with the blood pressure reading. We have papers we can give you to record these on with columns for the date and time of day. Bring your record to every visit. Bring your blood pressure cuff at least once every year so we can compare your cuff readings against our cuff readings. Bring your cuff more often if there are differences between the readings of our cuff and your cuff or if you are not sure if your cuff is working properly. If your cuff is consistently off by 10 points higher or lower than our cuff you can still use the cuff as long as you remember to do the math when telling us your blood pressures. We can also give you pointers on how to use your cuff to make sure your readings are the most accurate possible. Gastroesophage al reflux disease, unspecified whether esophagitis present  -     omeprazole (PRILOSEC) 20 MG delayed release capsule; Take 1 capsule by mouth every morning (before breakfast)  -     Good control on the medicine. Alcohol dehydrates the stomach lining and can lead to ulcers. The more you drink and especially the more you drink on empty stomach more likely you are to have heartburn, reflux and/or develop an ulcer. Osteoarthritis of left AC (acromioclavicular) joint  -     Abraham Boyce MD, Orthopedic Surgery, [facility]  -     COMPREHENSIVE METABOLIC PANEL; Future  Range of motion do every 1-2 hours. Moist heat  Sports cream (Aspercreme doesn't smell). Diclofenac (brand name Voltaren) gel 1% is available over-the-counter. You can place 4 g above the shoulder joint and the [facility] (acromioclavicular) joint. You can also spread it straight down from the joint forward and backward.   It is not much help to put the gel over the outside (lateral) portion of the shoulder as the muscle is too thick to allow the medicine to be absorbed into the joint (unless the pain is simply in the muscle such as a rotator cuff sprain). You can put this on up to 4 times a day. Even if you do 2 joints 4 times a day there is not enough absorbed into the bloodstream to upset the stomach or significantly affect the kidneys. Tylenol 500 mg up to 2 tabs 3x/day as needed. If diagnosed with arthritis in the past:  Glucosamine 1500 mg/ chondroitin 1200 mg once daily or any combination equaling that dose i.e. 750/600 mg twice daily or 500/400 mg three time daily. This is a daily joint/arthritis supplement without significant side effects. Winged scapula of left side  -     Javi Mccormick MD, Orthopedic Surgery, [facility]    Extrapyramidal movement disorder, drug-induced  -     trihexyphenidyl (ARTANE) 2 MG tablet; Take 1 tablet by mouth 2 times daily  -     MAGNESIUM; Future  Stable by observation. Hypercholesterolemia  -     LIPID PANEL; Future  We have only checked your cholesterol twice since you have been a patient here. You do not have to schedule your blood draw when you go to the hospital.  You can simply show up fasting on any morning they are open and tell them you have orders in the computer. The fasting means you cannot eat anything for the 12 hours prior and you cannot drink anything with nutrition. So you can drink water or black coffee or black tea as you usually would but no sugar/artificial sweetener or cream or whether dairy or nondairy. It is important that you drink your normal amount of fluids so you do not appear dehydrated on the labs. Vitamin D deficiency  -     VITAMIN D 25 HYDROXY; Future   Ref. Range 9/13/2016 10:40 3/25/2019 11:52   Vit D, 25-Hydroxy  ng/mL 32.8 26.8 (L)   And low vitamin D causes muscle aches, fatigue, and increased risk of infection and mood instability.   Vitamin D also strong antibody reaction causing flulike symptoms. Not everybody experiences this. It occurs very often in people who have already had COVID-19 who get the first shot. It sometimes also occurs in people who have never had symptoms of COVID-19 following the first shot with the Chakraborty Peter and Moderna vaccines if they had a prior asymptomatic COVID-19 infection. Symptoms can include: Fatigue, lightheadedness due to low blood pressure, headache, muscle aches, enlargement of lymph nodes in the armpit on the same side as the vaccine was given, or for some people nausea vomiting and diarrhea. It is wise to have some sports drink like Gatorade at the house to keep the blood pressures up if your blood pressure drops and you feel lightheaded. This can also be used if you are getting dehydrated from nausea, vomiting or diarrhea. If you have nausea and vomiting call the office and we will call in medication to prevent dehydration which will worsen and prolong your flulike symptoms. If you have diarrhea but no nausea or vomiting you can take a zinc pill 50 mg over-the-counter during or immediately after a meal for the first dose of zinc and then decrease to 1/2 pill (25 mg) with a meal as needed for diarrhea or loose stools. The usual maximum dose for zinc is 50 mg twice a day. Watch for constipation when taking zinc.  If you are prone to diarrhea taking a probiotic several days before getting the vaccine and until symptoms resolve after the vaccine may help lessen risk of diarrhea and loose stools. Taking vitamin D before and after your shot for a few weeks will help the immune system and decrease flulike symptoms. The dosage depends on your previous vitamin D level. Anywhere from 400 IU (10 mcg) to 4000 IU (100 mcg) may be needed. It will also help your immune system to take vitamin C 500 mg once or twice a day.     Nutrients that support the immune system:  Beta carotene/vitamin A  Vitamin C  Vitamin D  Zinc  Proteins  Probiotics/prebiotic's(prebiotics are fiber sources that support the growth of probiotics)  Water from all sources including foods such as fruit: Women 2.7 L / 91 ounces per day AND men 3.7 L/125 ounces per day  Source Priscilla Corona [employer] (award winning nutritionist/registered dietitian, author, ) 3/25/2020     Remember that there are still COVID-19 variants. The [facility] strain is well covered by the vaccine. With the [place], [place] strains and others developing in the states we are not sure how effective the vaccines will be against those strains. Return in about 3 months (around 7/1/2021) for Hypertension, Epilepsy, Anxiety, GERD, Depression. SUBJECTIVE/OBJECTIVE:  Chief Complaint   Patient presents with    Other     DEPRESSION SCRN DUE    Anxiety     3 MONTH FOLLOW UP ON ANXIETY   408  HPI    Winged scapula of left side/ Osteoarthritis of left AC (acromioclavicular) joint  Seen by Dr Russ Mora. No tx but Voltaren gel qd. Had his shoulder pulled out of the socket by his father when he was young. They never took him to the doctor at that time because they they were afraid of children's protective services. Partial symptomatic epilepsy with complex partial seizures, not intractable, without status epilepticus ([address]  He had an issue a few days ago. Felt like leading into a panic attack. Took 1 of 2 mg Clonazepam and went away at 20 min. Went to sleep. Social anxiety disorder  He is interacts with people downstairs. He gets anxious and has to go suddenly. Essential hypertension  Is checking at home. Has been good per his parent. Is watching salt. Eats a lot of TV dinners. Tries to grab what he can afford. Gastroesophageal reflux disease, unspecified whether esophagitis present  No problem since on the med. No NSAIDS/ ASA. Bipolar 1 disorder, mixed, full remission ([address]  1 day was feeling very perky. The next night was exact opposite. He thinks his symptoms present 9.   He [] Abnormal -   No lumps bumps or tenderness to patient palpation. Pulmonary/Chest: [x] Respiratory effort normal   [x] No visualized signs of difficulty breathing or respiratory distress. [x] Abnormal -no cough with initial deep breath. Does cough with deep breath followed by a forced expiration. Musculoskeletal:   [x] Normal gait with no signs of ataxia         [x] Normal range of motion of neck        [] Abnormal -     Neurological:        [x] No Facial Asymmetry (Cranial nerve 7 motor function) (limited exam due to video visit)          [x] No gaze palsy        [] Abnormal -          Skin:        [x] No significant exanthematous lesions or discoloration noted on facial skin         [] Abnormal -            Psychiatric:       [x] Normal Affect [] Abnormal -        [x] No Hallucinations    Other pertinent observable physical exam findings:-  Radiology:     X-rays obtained and reviewed in office:  4 views AP/Grashey/Axillary/Scapular Y of the left shoulder dated 9/18/2020 demonstrate no acute fracture. No dislocation. No major degenerative changes. Normal alignment. No visible bony lesions or loose bodies. On this date 4/1/2021 I have spent 54 minutes reviewing previous notes, test results and face to face (virtual) with the patient discussing the diagnosis and importance of compliance with the treatment plan as well as documenting on the day of the visit. Allyn Villaseñor, was evaluated through a synchronous (real-time) audio-video encounter. The patient (or guardian if applicable) is aware that this is a billable service. Verbal consent to proceed has been obtained within the past 12 months. The visit was conducted pursuant to the emergency declaration under the [address] [place] [address], [address] [place] [address] and the [place] [employer] [place] [employer] Act.   Patient identification was verified, and a caregiver was present when appropriate. The patient was located in a state where the provider was credentialed to provide care. An electronic signature was used to authenticate this note.     --Natividad Girard, DO

## 2021-04-09 NOTE — PATIENT INSTRUCTIONS
Gideon Rios was seen today for other and anxiety. Diagnoses and all orders for this visit:    Partial symptomatic epilepsy with complex partial seizures, not intractable, without status epilepticus (HCC)  -     gabapentin (NEURONTIN) 400 MG capsule; Take 4 capsules nightly for sleep  -     clonazePAM (KLONOPIN) 1 MG tablet; Take 1 tablet by mouth 2 times daily for 90 days.  -     COMPREHENSIVE METABOLIC PANEL; Future  -     MAGNESIUM; Future  -     Gamma GT; Future  Get the fasting blood work at [facility] as soon as possible. They have been ordered for Monday, 4/12/2021 or thereafter approximately. You should going get these first thing in the morning before you eat breakfast and before have coffee if it has creamer or sugar in it. -     Good control.  -     Continue meds and lifestyle control. Social anxiety disorder  -     clonazePAM (KLONOPIN) 1 MG tablet; Take 1 tablet by mouth 2 times daily for 90 days. Work on deep breathing exercises. Try 4 deep breaths in followed by not breathing for the count of 4. Then repeat while monitoring your pulse to watch your pulse decrease. Bipolar 1 disorder, mixed, full remission (HCC)  -     ziprasidone (GEODON) 40 MG capsule; Take 1 capsule by mouth daily Take with 60 mg tab at dinner  -     ziprasidone (GEODON) 60 MG capsule; Take 1 capsule by mouth 2 times daily (with meals) Take a 60 mg with a 40 mg at dinner  -     lamoTRIgine (LAMICTAL) 200 MG tablet; TAKE 2 TABLETS 1 TIME DAILY AT DINNER  Work on decreasing alcohol. You were doing much better when you were only drinking small amounts occasionally. Major depression in complete remission (HCC)  -     ziprasidone (GEODON) 40 MG capsule; Take 1 capsule by mouth daily Take with 60 mg tab at dinner  -     ziprasidone (GEODON) 60 MG capsule; Take 1 capsule by mouth 2 times daily (with meals) Take a 60 mg with a 40 mg at dinner  Work on building an emotional support system.   It is very hard to find people symptoms associated with the blood pressure reading. We have papers we can give you to record these on with columns for the date and time of day. Bring your record to every visit. Bring your blood pressure cuff at least once every year so we can compare your cuff readings against our cuff readings. Bring your cuff more often if there are differences between the readings of our cuff and your cuff or if you are not sure if your cuff is working properly. If your cuff is consistently off by 10 points higher or lower than our cuff you can still use the cuff as long as you remember to do the math when telling us your blood pressures. We can also give you pointers on how to use your cuff to make sure your readings are the most accurate possible. Gastroesophage al reflux disease, unspecified whether esophagitis present  -     omeprazole (PRILOSEC) 20 MG delayed release capsule; Take 1 capsule by mouth every morning (before breakfast)  -     Good control on the medicine. Alcohol dehydrates the stomach lining and can lead to ulcers. The more you drink and especially the more you drink on empty stomach more likely you are to have heartburn, reflux and/or develop an ulcer. Osteoarthritis of left AC (acromioclavicular) joint  -     Mahesh Ferrari MD, Orthopedic Surgery, [facility]  -     COMPREHENSIVE METABOLIC PANEL; Future  Range of motion do every 1-2 hours. Moist heat  Sports cream (Aspercreme doesn't smell). Diclofenac (brand name Voltaren) gel 1% is available over-the-counter. You can place 4 g above the shoulder joint and the [facility] (acromioclavicular) joint. You can also spread it straight down from the joint forward and backward. It is not much help to put the gel over the outside (lateral) portion of the shoulder as the muscle is too thick to allow the medicine to be absorbed into the joint (unless the pain is simply in the muscle such as a rotator cuff sprain).   You can put this on up to 4 If not most of the time 50,000 IU once weekly is covered. Your vitamin D level actually goes up more quickly with 5000 once a day than 50,000 once a week as you absorb it better when taking it daily. Medication monitoring encounter  -     COMPREHENSIVE METABOLIC PANEL; Future  -     MAGNESIUM; Future  -     Gamma GT; Future    Educated about COVID-19 virus infection    COVID-19 vaccine  Get the vaccine as soon as possible. If you had COVID-19 you should not need the vaccination for 90 days afterwards. This is because you already have antibodies to the virus. The first shot usually is only associated with soreness in the arm unless you have already had COVID-19. Then your reaction may be similar to getting the second shot as below. There is a risk of allergic reaction so everybody is supposed to stay at the vaccination site for 30 minutes after getting the vaccine. It would be a great idea to have Benadryl 25 g with you to take IF you have a milder allergic reaction after you leave the site. If you use an EpiPen take it with you. Using an EpiPen is not a contraindication to the vaccine only a precaution. Notify them that you use an EpiPen when they give you the shot. The second shot can be associated with flulike symptoms - but you CANNOT get COVID-19 from the vaccination. The flulike symptoms are your body's response to the RNA injected which has already produced antibodies after the first shot. It is better to have a few days of flulike symptoms than a few weeks on a ventilator. COVID-19 VACCINE REACTION TREATMENT OF FLULIKE SYMPTOMS  Often after the second shot with the [address] vaccines or after the first [employer] vaccine shot there is a strong antibody reaction causing flulike symptoms. Not everybody experiences this. It occurs very often in people who have already had COVID-19 who get the first shot.   It sometimes also occurs in people who have never had symptoms of COVID-19 following the first shot with the Chakraborty Peter and Moderna vaccines if they had a prior asymptomatic COVID-19 infection. Symptoms can include: Fatigue, lightheadedness due to low blood pressure, headache, muscle aches, enlargement of lymph nodes in the armpit on the same side as the vaccine was given, or for some people nausea vomiting and diarrhea. It is wise to have some sports drink like Gatorade at the house to keep the blood pressures up if your blood pressure drops and you feel lightheaded. This can also be used if you are getting dehydrated from nausea, vomiting or diarrhea. If you have nausea and vomiting call the office and we will call in medication to prevent dehydration which will worsen and prolong your flulike symptoms. If you have diarrhea but no nausea or vomiting you can take a zinc pill 50 mg over-the-counter during or immediately after a meal for the first dose of zinc and then decrease to 1/2 pill (25 mg) with a meal as needed for diarrhea or loose stools. The usual maximum dose for zinc is 50 mg twice a day. Watch for constipation when taking zinc.  If you are prone to diarrhea taking a probiotic several days before getting the vaccine and until symptoms resolve after the vaccine may help lessen risk of diarrhea and loose stools. Taking vitamin D before and after your shot for a few weeks will help the immune system and decrease flulike symptoms. The dosage depends on your previous vitamin D level. Anywhere from 400 IU (10 mcg) to 4000 IU (100 mcg) may be needed. It will also help your immune system to take vitamin C 500 mg once or twice a day.     Nutrients that support the immune system:  Beta carotene/vitamin A  Vitamin C  Vitamin D  Zinc  Proteins  Probiotics/prebiotic's(prebiotics are fiber sources that support the growth of probiotics)  Water from all sources including foods such as fruit: Women 2.7 L / 91 ounces per day AND men 3.7 L/125 ounces per day  Source [employer] (award winning nutritionist/registered dietitian, author, ) 3/25/2020     Remember that there are still COVID-19 variants. The [facility] strain is well covered by the vaccine. With the [place], [place] strains and others developing in the states we are not sure how effective the vaccines will be against those strains.

## 2021-04-14 NOTE — PROGRESS NOTES
I SENT A MESSAGE TO GILDA MOROCHO MA, WHO IS DR NAVA'S MA.  I HAVE ASKED HER TO REACH OUT TO THE PT AND FIND OUT IF HE IS INTERESTED IN AN ALCOHOL TREATMENT FACILITY. IF SO, WE WILL CALL [facility] TO HELP FIND OUT WHERE HE CAN GO.   [facility]

## 2021-04-21 DIAGNOSIS — G25.89 EXTRAPYRAMIDAL MOVEMENT DISORDER, DRUG-INDUCED: ICD-10-CM

## 2021-04-21 DIAGNOSIS — T50.905A EXTRAPYRAMIDAL MOVEMENT DISORDER, DRUG-INDUCED: ICD-10-CM

## 2021-04-21 DIAGNOSIS — E78.00 HYPERCHOLESTEROLEMIA: ICD-10-CM

## 2021-04-21 RX ORDER — TRIHEXYPHENIDYL HYDROCHLORIDE 2 MG/1
2 TABLET ORAL 2 TIMES DAILY
Qty: 60 TABLET | Refills: 2 | Status: SHIPPED | OUTPATIENT
Start: 2021-04-21 | End: 2021-08-04 | Stop reason: SDUPTHER

## 2021-04-21 RX ORDER — ATORVASTATIN CALCIUM 10 MG/1
10 TABLET, FILM COATED ORAL DAILY
Qty: 30 TABLET | Refills: 2 | Status: SHIPPED | OUTPATIENT
Start: 2021-04-21 | End: 2021-08-04 | Stop reason: SDUPTHER

## 2021-04-22 ENCOUNTER — OFFICE VISIT (OUTPATIENT)
Dept: ORTHOPEDIC SURGERY | Age: 41
End: 2021-04-22
Payer: COMMERCIAL

## 2021-04-22 VITALS — WEIGHT: 200 LBS | HEIGHT: 68 IN | BODY MASS INDEX: 30.31 KG/M2

## 2021-04-22 DIAGNOSIS — M25.512 CHRONIC LEFT SHOULDER PAIN: Primary | ICD-10-CM

## 2021-04-22 DIAGNOSIS — G89.29 CHRONIC LEFT SHOULDER PAIN: Primary | ICD-10-CM

## 2021-04-22 PROCEDURE — 4004F PT TOBACCO SCREEN RCVD TLK: CPT | Performed by: ORTHOPAEDIC SURGERY

## 2021-04-22 PROCEDURE — G8427 DOCREV CUR MEDS BY ELIG CLIN: HCPCS | Performed by: ORTHOPAEDIC SURGERY

## 2021-04-22 PROCEDURE — G8417 CALC BMI ABV UP PARAM F/U: HCPCS | Performed by: ORTHOPAEDIC SURGERY

## 2021-04-22 PROCEDURE — 99204 OFFICE O/P NEW MOD 45 MIN: CPT | Performed by: ORTHOPAEDIC SURGERY

## 2021-04-22 RX ORDER — METHYLPREDNISOLONE 4 MG/1
TABLET ORAL
Qty: 1 KIT | Refills: 0 | Status: SHIPPED | OUTPATIENT
Start: 2021-04-22 | End: 2021-04-28

## 2021-04-22 NOTE — PROGRESS NOTES
GILDA, CAN YOU DOCUMENT WHAT WAS DONE ON THIS MESSAGE AND LET DR Cachorro Oleary?   [facility]

## 2021-04-22 NOTE — PROGRESS NOTES
I went on the interenet and found 2 different places for patient to have [place] [id number] meetings. [facility]
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
## 2021-04-28 ENCOUNTER — OFFICE VISIT (OUTPATIENT)
Dept: ORTHOPEDIC SURGERY | Age: 41
End: 2021-04-28
Payer: COMMERCIAL

## 2021-04-28 VITALS — RESPIRATION RATE: 12 BRPM | WEIGHT: 200 LBS | BODY MASS INDEX: 30.31 KG/M2 | HEIGHT: 68 IN

## 2021-04-28 DIAGNOSIS — M19.012 ARTHRITIS OF LEFT ACROMIOCLAVICULAR JOINT: ICD-10-CM

## 2021-04-28 DIAGNOSIS — M75.02 ADHESIVE CAPSULITIS OF LEFT SHOULDER: Primary | ICD-10-CM

## 2021-04-28 PROCEDURE — 99214 OFFICE O/P EST MOD 30 MIN: CPT | Performed by: ORTHOPAEDIC SURGERY

## 2021-04-28 PROCEDURE — 20606 DRAIN/INJ JOINT/BURSA W/US: CPT | Performed by: ORTHOPAEDIC SURGERY

## 2021-04-28 PROCEDURE — 4004F PT TOBACCO SCREEN RCVD TLK: CPT | Performed by: ORTHOPAEDIC SURGERY

## 2021-04-28 PROCEDURE — 20610 DRAIN/INJ JOINT/BURSA W/O US: CPT | Performed by: ORTHOPAEDIC SURGERY

## 2021-04-28 PROCEDURE — G8417 CALC BMI ABV UP PARAM F/U: HCPCS | Performed by: ORTHOPAEDIC SURGERY

## 2021-04-28 PROCEDURE — G8427 DOCREV CUR MEDS BY ELIG CLIN: HCPCS | Performed by: ORTHOPAEDIC SURGERY

## 2021-04-28 RX ORDER — METHYLPREDNISOLONE ACETATE 40 MG/ML
40 INJECTION, SUSPENSION INTRA-ARTICULAR; INTRALESIONAL; INTRAMUSCULAR; SOFT TISSUE ONCE
Status: COMPLETED | OUTPATIENT
Start: 2021-04-28 | End: 2021-04-28

## 2021-04-28 RX ADMIN — METHYLPREDNISOLONE ACETATE 40 MG: 40 INJECTION, SUSPENSION INTRA-ARTICULAR; INTRALESIONAL; INTRAMUSCULAR; SOFT TISSUE at 13:35

## 2021-04-28 NOTE — PROGRESS NOTES
Chief Complaint    Shoulder Pain (F/u left shoulder pain. U/S guided injection today)      History of Present Illness:  Royal Riggs is a pleasant, 47 y.o., male, here today for follow up of left shoulder, adhesive capsulitis. Ongoing for about 12 weeks. He is here for his cortisone injections in the subacromial, glenohumeral, and AC joint. He reports no new injuries or setbacks. Medical History:  Patient's medications, allergies, past medical, surgical, social and family histories were reviewed and updated as appropriate. Review of Systems  A 14 point review of systems was completed by the patient and is available in the media section of the scanned medical record and was reviewed on 4/28/2021. The review is negative with the exception of those things mentioned in the HPI and Past Medical History    Vital Signs:  Vitals:    04/28/21 1253   Resp: 12       General/Appearance: Alert and oriented and in no apparent distress. Skin:  There are no skin lesions, cellulitis, or extreme edema. The patient has warm and well-perfused Bilateral upper extremities with brisk capillary refill. LEFT Shoulder Exam:   Inspection:  No gross deformities, no signs of infection. Palpation: Tenderness over AC joint and posterior joint line    Active Range of Motion: Forward Elevation 90, Abduction 90, External Rotation 15, Internal Rotation back pocket    Passive Range of Motion: same    Strength:  External Rotation 5/5, Internal Rotation 5/5, Supraspinatus 5/5, Champagne Toast 5/5    Special Tests:   No Gianni muscle deformity. Neurovascular: Sensation to light touch is intact, no motor deficits, palpable radial pulses 2+      Radiology:     No new XR obtained at this time. Assessment :  Mr. Royal Riggs is a pleasant, 47 y.o. patient who is having left shoulder pain related to severe AC joint arthritis and adhesive capsulitis, in its painful phase.       Impression:  Encounter Diagnosis Name Primary?  Adhesive capsulitis of left shoulder Yes       Office Procedures:  Orders Placed This Encounter   Procedures    US GUIDED NEEDLE PLACEMENT     Standing Status:   Future     Standing Expiration Date:   4/28/2022    SC ARTHROCENTESIS ASPIR&/INJ MAJOR JT/BURSA W/O US       Treatment Plan: We recommended a cortisone injection today. .    Also recommended that he undergo activity modification relative rest but to start physical therapy for some gentle motion only, but  no strengthening. we recommend He commence in physical therapy at . A new physical therapy letter was documented in [facility] today. The patient was given the option of performing today's injection using ultrasound guidance. We discussed the pros and cons of using the ultrasound for guidance. The patient chose to proceed, and today's injection was performed using Logic E ultrasound unit with a variable frequency (10 MHz) linear transducer for localization and needle placement. The image was saved for the medical record. Procedure: The indications and risks of steroid injection as well as treatment alternatives were discussed with the patient who consented to the procedure. Under sterile conditions and after informed consent was obtained, using posterior approach the patient was given an injection into the LEFT shoulder split equally between subacromial space and glenohumeral joint. 2mL 40 mg of Depo-Medrol and 4 mL of 0.5% ropivacaine (Naropin) were placed in the shoulder after it was prepped with chlorhexidine. This resulted in good relief of symptoms. There were no complications. The patient was advised to ice the shoulder this evening and to avoid vigorous activities for the next 2 days. They were advised to call us if there was any erythema, enduration, swelling or increasing pain.     Similarly the [facility] joint was injected with 1 mL of 40 mg Depo-Medrol and 2 cc of 0.5% ropivacaine under ultrasound guidance after standard prep and drape no complications. We will see Gabe Godoy back in 4 weeks and/or as needed. All questions were answered to patient's satisfaction and He was encouraged to call with any further questions or concerns. Alva Patton is in agreement with this plan. Sincerely,    Tony Leyva MD [address] [facility]   [address], [address] [place] [address], [facility] [address]  Email: [email or web address]  Office: [phone]    04/28/21  1:35 PM      The encounter with Dasha French was carried out by myself, Dr Rene Monroe, who personally examined the patient and reviewed the plan. This dictation was performed with a verbal recognition program (DRAGON) and it was checked for errors. It is possible that there are still dictated errors within this office note. If so, please bring any errors to my attention for an addendum. All efforts were made to ensure that this office note is accurate.

## 2021-04-28 NOTE — LETTER
Physical Therapy Rehabilitation Referral    Patient Name:  Tristen Lizarraga      YOB: 1980    Diagnosis:    1. Adhesive capsulitis of left shoulder    2. Arthritis of left acromioclavicular joint        Precautions:     [x] Evaluate and Treat    Post Op Instructions:  [] Continuous passive motion (CPM) [] Elbow ROM  [x] Exercise in plane of scapula  []  Strengthening     [] Pulley and instruction   [x] Home exercise program (copy to patient)   [] Sling when arm at risk  [] Sling or brace at all times   [] AAROM: Forward elevation to  140            [] AAROM: External rotation  To  40    [] Isometric external rotator strengthening [] AAROM: internal rotation: up the back  [] Isometric abductor strengthening  [] AAROM: Internal abduction   [] Isometric internal rotator strengthening [] AAROM: cross-body adduction             Stretching:     Strengthening:  [x] Four quadrant (FE, ER, IR, CBA)  [] Rotator cuff (ER, IR, Abd)  [x] Forward Elevation    [] External Rotators     [x] External Rotation    [] Internal Rotators  [x] Internal Rotation: up/back   [] Abductors     [x] Internal Rotation: supine in abduction  [] Sleeper Stretch    [] Flexors  [] Cross-body abduction    [] Extensors  [] Pendulum (FE, Abd/Add, cw/ccw)  [] Scapular Stabilizers   [] Wall-walking (FE, Abd)        [] Shoulder shrugs     [] Table slides (FE)                [] Rhomboid pinch  [] Elbow (flex, ext, pron, sup)        [] Lat.  Pull downs     [] Medial epicondylitis program       [] Forward punch   [] Lateral epicondylitis program       [] Internal rotators     [] Progressive resistive exercises  [] Bench Press        [] Bench press plus  Activities:     [] Lateral pull-downs  [] Rowing     [] Progressive two-hand supine press  [] Stepper/Exercise bike   [] Biceps: curls/supination  [] Swimming  [] Water exercises    Modalities:     Return to Sport:  [x] Of Choice      [] Plyometrics  [] Ultrasound     [] Rhythmic stabilization  [] Iontophoresis    [] Core strengthening   [] Moist heat     [] Sports specific program:   [] Massage         [x] Cryotherapy      [] Electrical stimulation     [] Paraffin  [] Whirlpool  [] TENS    [x] Home exercise program (copy to patient). Perform exercises for:   15     minutes    3      times/day  [x] Supervised physical therapy  Frequency: []  1x week  [x] 2x week  [] 3x week  [] Other:   Duration: [] 2 weeks   [] 4 weeks  [x] 6 weeks  [] Other:     Additional Instructions:       Gentle ROM exercises only. No strengthening exercises at this time. Sincerely,    Ashley Leyva MD [facility]  Hip Preservation & Sports Medicine Surgeon   [address] [facility] and [address] [facility]. [address], [address] [place] [address]  Email: [email or web address]  Office: [phone]    04/28/21  1:37 PM

## 2021-04-28 NOTE — PROGRESS NOTES
4/28/21  11:29 AM        NDC: 10526-608-94   -   Ropivacaine 0.5 %    LOT: [id number]    COMMENT: LEFT SHOULDER GLENOHUMERAL JOINT + SUBACROMIAL SPACE      4/28/21  1:38 PM        NDC: 30998-708-83   -   Ropivacaine 0.5 %    LOT: [id number]    COMMENT: [facility] JOINT INJECTION

## 2021-05-05 ENCOUNTER — TELEPHONE (OUTPATIENT)
Dept: ORTHOPEDIC SURGERY | Age: 41
End: 2021-05-05

## 2021-05-05 NOTE — TELEPHONE ENCOUNTER
GAVE [facility] ) 04/2021 TO CURRENT TO DENISSE NEELY TO SCAN IN MRO TO OOD

## 2021-05-19 ENCOUNTER — HOSPITAL ENCOUNTER (OUTPATIENT)
Dept: PHYSICAL THERAPY | Age: 41
Setting detail: THERAPIES SERIES
Discharge: HOME | End: 2021-05-19
Payer: COMMERCIAL

## 2021-05-19 PROCEDURE — 97161 PT EVAL LOW COMPLEX 20 MIN: CPT

## 2021-05-19 PROCEDURE — 97140 MANUAL THERAPY 1/> REGIONS: CPT

## 2021-05-19 PROCEDURE — 97110 THERAPEUTIC EXERCISES: CPT

## 2021-05-19 NOTE — PROGRESS NOTES
[address] [place] [address] and Sports Rehabilitation, [address], [address] [place] [address] [address]  Phone: [phone]   Fax: [phone]    Date: 2021          Patient Name; :  Rossi Cortés; 1980   Dx: Diagnosis: M75.02 (ICD-10-CM) - Adhesive capsulitis of left shoulder      Physician: Referring Practitioner: Bernard De La Cruz MD        Total PT Visits:      Measures Previous Current   Pain (0-10)     Disability %           Assessment:        Prognosis for POC: [] Good [] Fair  [] Poor      Patient requires continued skilled intervention: [] Yes  [] No        Plan & Recommendations:  [] Continue rehabilitation due to objective improvement and continued functional deficits with frequency and duration:   [] Progress toward  []GAP, []Work Conditioning, []Independent HEP   [] Discharge due to   [] All goals achieved, [] Maximized \"medical necessity\" [] No subjective or objective improvements      Electronically signed by:  Leander Castellano PT  Therapy Plan of Care Re-Certification  This patient has been re-evaluated for physical therapy services and for therapy to continue, Medicare, Medicaid and other insurances require periodic physician review of the treatment plan. Please review the above re-evaluation and verify that you agree with plan of care as established above by signing the attached document and return it to our office or note changes to established plan below  [] Follow treatment plan as above [] Discontinue physical therapy  [] Change plan to:                                 __________________________________________________    Physician Signature:____________________________________ Date:____________  By signing above, therapists plan is approved by physician    If you have any questions or concerns, please don't hesitate to call.   Thank you for your referral.

## 2021-05-19 NOTE — FLOWSHEET NOTE
[address] [place] [address] and Sports Rehabilitation[address], [address] [place] [address] [address]  Phone: [phone]   Fax:     [phone]      Physical Therapy Treatment Note/ Progress Report:           Date:  2021    Patient Name:  Katie Hernandez    :  1980  MRN: [id number]  Restrictions/Precautions:    Medical/Treatment Diagnosis Information:  · Diagnosis: M75.02 (ICD-10-CM) - Adhesive capsulitis of left shoulder  · Treatment Diagnosis: Left shoulder pain, decreased ROM  Insurance/Certification information:  PT Insurance Information: [facility]  Physician Information:  Referring Practitioner: July Parmar MD  Has the plan of care been signed (Y/N):        []  Yes  [x]  No     Date of Patient follow up with Physician:    Assessment Summary: Gideon Rios is a 47 y.o. male reporting to OP PT with c/c of left shoulder pain which has been occurring for many years due to work. Pt is noted to have mild reduction of external ROM and good strength.        Is this a Progress Report:     []  Yes  [x]  No        If Yes:  Date Range for reporting period:  Beginnin21  Endin21    Progress report will be due (10 Rx or 30 days whichever is less):        Recertification will be due (POC Duration  / 90 days whichever is less): 21          Visit # Insurance Allowable Auth Required   In Person 1  []  Yes     []  No    Tele Health   []  Yes     []  No    Total          Functional Scale: Quick Dash 16%   Date assessed:      Latex Allergy:  [x]NO      []YES  Preferred Language for Healthcare:   [x]English       []other:      Pain level:  2-9/10     SUBJECTIVE:  See eval    OBJECTIVE: See eval      RESTRICTIONS/PRECAUTIONS: None    Exercises/Interventions: HEP code:  [id number]  Therapeutic Ex (68822) HEP 21     Warm-up       Pulleys       UBE              TABLE       Supine concentric circles x x30 ea, 2#     Supine SP x X30, 2# cervical/CT, scapular GHJ and UE for the purpose of modulating pain, promoting relaxation,  increasing ROM, reducing/eliminating soft tissue swelling/inflammation/restriction, improving soft tissue extensibility and allowing for proper ROM for normal function with self care, reaching, carrying, lifting, house/yardwork, driving/computer work    Modalities:     [] GAME READY (VASO)- for significant edema, swelling, pain control. Charges:  Timed Code Treatment Minutes: 23   Total Treatment Minutes:  35   BWC:  TE TIME:  NMR TIME:  MANUAL TIME:  UNTIMED MINUTES:  Medicare Total:   15    8            [x] EVAL (LOW) 07342 (typically 20 minutes face-to-face)  [] EVAL (MOD) 32872 (typically 30 minutes face-to-face)  [] EVAL (HIGH) 90170 (typically 45 minutes face-to-face)  [] RE-EVAL     [x] DE(39570) x     [] IONTO  [] NMR (64058) x     [] VASO  [x] Manual (73200) x     [] Other:  [] TA x      [] Mech Traction (01837)  [] ES(attended) (26183)      [] ES (un) (03430):       GOALS:     Patient stated goal: Reduce pain, work without limitation     Therapist goals for Patient:   Short Term Goals: To be achieved in: 2 weeks  1. Independent in HEP and progression per patient tolerance, in order to prevent re-injury. []? Progressing: []? Met: []? Not Met: []? Adjusted      2. Patient will have a decrease in pain to facilitate improvement in movement, function, and ADLs as indicated by Functional Deficits. []? Progressing: []? Met: []? Not Met: []? Adjusted      Long Term Goals: To be achieved in: 6 weeks  1. Disability index score of 0 % or less for the DASH to assist with reaching prior level of function. []? Progressing: []? Met: []? Not Met: []? Adjusted      2. Patient will demonstrate increased AROM ER to 70° to allow for proper joint functioning as indicated by patients Functional Deficits. []? Progressing: []? Met: []? Not Met: []? Adjusted         3.  Patient will return to  functional activities without increased symptoms or restriction. []? Progressing: []? Met: []? Not Met: []? Adjusted      5. 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 (patient specific functional goal)    []? Progressing: []? Met: []? Not Met: []? Adjusted        ASSESSMENT:  See eval    Patient received education on their current pathology and how their condition effects them with their functional activities. Patient understood discussion and questions were answered. Patient understands their activity limitations and understands rational for treatment progression. Pt educated on plan of care and HEP, if worsening symptoms to d/c that exercise. PLAN: See eval  [x] Continue per plan of care [] Alter current plan (see comments above)  [] Plan of care initiated [] Hold pending MD visit [] Discharge      Electronically signed by:  Shira Torres PT    Note: If patient does not return for scheduled/ recommended follow up visits, this note will serve as a discharge from care along with most recent update on progress.

## 2021-05-19 NOTE — PLAN OF CARE
[address] [facility] [address], [address] [place] [facility] [address]  Phone: [phone]   Fax:     [phone]                                                       Physical Therapy Certification    Dear Referring Practitioner: Patrice Pisano MD,    We had the pleasure of evaluating the following patient for physical therapy services at [address] [place] [address]. A summary of our findings can be found in the initial assessment below. This includes our plan of care. If you have any questions or concerns regarding these findings, please do not hesitate to contact me at the office phone number checked above. Thank you for the referral.       Physician Signature:_______________________________Date:__________________  By signing above (or electronic signature), therapists plan is approved by physician      Patient: Gina Main   : 1980   MRN: [id number]  Referring Physician: Referring Practitioner: Patrice Pisano MD      Evaluation Date: 2021      Medical Diagnosis Information:  Diagnosis: M75.02 (ICD-10-CM) - Adhesive capsulitis of left shoulder   Treatment Diagnosis: Left shoulder pain, decreased ROM                                         Insurance information: PT Insurance Information: [employer]    Precautions/ Contra-indications: None    Latex Allergy:  [x]NO      []YES    Preferred Language for Healthcare:   [x]English       []other:    SUBJECTIVE: Patient stated complaint: 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 Had cortisone injection the other day which helped helped a lot. Relevant Medical History: None    Pain Scale: Current: 0/10; Max: 8/10; Best: 0/10    Easing factors: Rest    Provocative factors:  Movement,     Type: []Constant   [x]Intermittent  []Radiating []Localized []other:     Numbness/Tingling: None    Occupation/School: Farrier    Living Status/Prior Level of Function: Independent with ADLs and IADLs. C-SSRS Triggered by Intake questionnaire (Past 2 wk assessment):   [x] No, Questionnaire did not trigger screening.   [] Yes, Patient intake triggered further evaluation      [] C-SSRS Screening completed  [] PCP notified via Plan of Care  [] Emergency services notified     OBJECTIVE:     CERVICAL ROM RIGHT LEFT   Cervical Flexion     Cervical Extension     Cervical Lateral Flexion     Cervical Rotation          ROM RIGHT LEFT   Shoulder Flexion 170 170   Shoulder Abduction 170 170   Shoulder External Rotation 70 50   Shoulder Internal Rotation  T4                  Strength  RIGHT LEFT   Shoulder Flexion  5   Shoulder Abduction  5   Shoulder External Rotation  5   Shoulder Internal Rotation  5               Reflexes/Sensation:    [x]Dermatomes/Myotomes intact    [x]Reflexes equal and normal bilaterally   []Other:    Joint mobility:    []Normal    [x]Hypo   []Hyper    Palpation: NT    Functional Mobility/Transfers: Independent    Posture: WFL    Bandages/Dressings/Incisions: None    Gait: (include devices/WB status): WNL    Orthopedic Special Tests: - speeds                       [x] Patient history, allergies, meds reviewed. Medical chart reviewed. See intake form. Review Of Systems (ROS):  [x]Performed Review of systems (Integumentary, CardioPulmonary, Neurological) by intake and observation. Intake form has been scanned into medical record. Patient has been instructed to contact their primary care physician regarding ROS issues if not already being addressed at this time.       Co-morbidities/Complexities (which will affect course of rehabilitation):   [x]None           Arthritic conditions   []Rheumatoid arthritis (M05.9)  []Osteoarthritis (M19.91)   Cardiovascular conditions   []Hypertension (I10)  []Hyperlipidemia (E78.5)  []Angina pectoris (I20)  []Atherosclerosis (I70)   Musculoskeletal conditions   []Disc pathology   []Congenital spine pathologies   []Prior maintain good posture and demonstrate good body mechanics with sitting, bending, and lifting   [] Reduced ability or tolerance with driving and/or computer work   []Reduced ability to sleep   []Reduced ability to perform lifting, reaching, carrying tasks   []Reduced ability to tolerate impact through UE   []Reduced ability to reach behind back   [x]Reduced ability to  or hold objects   []Reduced ability to throw or toss an object   []other:    Participation Restrictions   []Reduced participation in self care activities   []Reduced participation in home management activities   [x]Reduced participation in work activities   []Reduced participation in social activities. []Reduced participation in sport/recreation activities. Classification:   []Signs/symptoms consistent with post-surgical status including decreased ROM, strength and function.   []Signs/symptoms consistent with joint sprain/strain   []Signs/symptoms consistent with shoulder impingement   [x]Signs/symptoms consistent with shoulder tendinopathy   []Signs/symptoms consistent with Rotator cuff tear   []Signs/symptoms consistent with labral tear   []Signs/symptoms consistent with postural dysfunction    []Signs/symptoms consistent with Glenohumeral IR Deficit - <45 degrees   []Signs/symptoms consistent with facet dysfunction of cervical/thoracic spine    []Signs/symptoms consistent with pathology which may benefit from Dry needling     []other:     Prognosis/Rehab Potential:      []Excellent   [x]Good    []Fair   []Poor    Tolerance of evaluation/treatment:    []Excellent   [x]Good    []Fair   []Poor    Physical Therapy Evaluation Complexity Justification  [x] A history of present problem with:  [x] no personal factors and/or comorbidities that impact the plan of care;  []1-2 personal factors and/or comorbidities that impact the plan of care  []3 personal factors and/or comorbidities that impact the plan of care  [x] An examination of body systems using standardized tests and measures addressing any of the following: body structures and functions (impairments), activity limitations, and/or participation restrictions;:  [x] a total of 1-2 or more elements   [] a total of 3 or more elements   [] a total of 4 or more elements   [x] A clinical presentation with:  [x] stable and/or uncomplicated characteristics   [] evolving clinical presentation with changing characteristics  [] unstable and unpredictable characteristics;   [x] Clinical decision making of [] low, [] moderate, [] high complexity using standardized patient assessment instrument and/or measurable assessment of functional outcome. [x] EVAL (LOW) 40791 (typically 20 minutes face-to-face)  [] EVAL (MOD) 23416 (typically 30 minutes face-to-face)  [] EVAL (HIGH) 72058 (typically 45 minutes face-to-face)  [] RE-EVAL     PLAN:  Frequency/Duration:  1 days per week for 6 Weeks:  INTERVENTIONS:  [x] Therapeutic exercise including: strength training, ROM, for Upper extremity and core   [x]  NMR activation and proprioception for UE, scap and Core   [x] Manual therapy as indicated for shoulder, scapula and spine to include: Dry Needling/IASTM, STM, PROM, Gr I-IV mobilizations, manipulation. [x] Modalities as needed that may include: thermal agents, E-stim, Biofeedback, US, iontophoresis as indicated  [x] Patient education on joint protection, postural re-education, activity modification, progression of HEP. HEP instruction: [id number](PHF scanned forms)    GOALS:  Patient stated goal: Reduce pain, work without limitation    Therapist goals for Patient:   Short Term Goals: To be achieved in: 2 weeks  1. Independent in HEP and progression per patient tolerance, in order to prevent re-injury. [] Progressing: [] Met: [] Not Met: [] Adjusted     2. Patient will have a decrease in pain to facilitate improvement in movement, function, and ADLs as indicated by Functional Deficits.   [] Progressing: [] Met: [] Not Met: [] Adjusted     Long Term Goals: To be achieved in: 6 weeks  1. Disability index score of 0 % or less for the DASH to assist with reaching prior level of function. [] Progressing: [] Met: [] Not Met: [] Adjusted     2. Patient will demonstrate increased AROM ER to 70° to allow for proper joint functioning as indicated by patients Functional Deficits. [] Progressing: [] Met: [] Not Met: [] Adjusted       3. Patient will return to  functional activities without increased symptoms or restriction. [] Progressing: [] Met: [] Not Met: [] Adjusted     5.  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 (patient specific functional goal)    [] Progressing: [] Met: [] Not Met: [] Adjusted      Electronically signed by:  Salma Prakash PT

## 2021-05-27 ENCOUNTER — OFFICE VISIT (OUTPATIENT)
Dept: ORTHOPEDIC SURGERY | Age: 41
End: 2021-05-27
Payer: COMMERCIAL

## 2021-05-27 VITALS — WEIGHT: 190 LBS | HEIGHT: 68 IN | BODY MASS INDEX: 28.79 KG/M2

## 2021-05-27 DIAGNOSIS — M62.838 TRAPEZIUS MUSCLE SPASM: ICD-10-CM

## 2021-05-27 DIAGNOSIS — M75.02 ADHESIVE CAPSULITIS OF LEFT SHOULDER: Primary | ICD-10-CM

## 2021-05-27 PROCEDURE — G8427 DOCREV CUR MEDS BY ELIG CLIN: HCPCS | Performed by: ORTHOPAEDIC SURGERY

## 2021-05-27 PROCEDURE — 99213 OFFICE O/P EST LOW 20 MIN: CPT | Performed by: ORTHOPAEDIC SURGERY

## 2021-05-27 PROCEDURE — 4004F PT TOBACCO SCREEN RCVD TLK: CPT | Performed by: ORTHOPAEDIC SURGERY

## 2021-05-27 PROCEDURE — G8417 CALC BMI ABV UP PARAM F/U: HCPCS | Performed by: ORTHOPAEDIC SURGERY

## 2021-05-27 NOTE — LETTER
Physical Therapy Rehabilitation Referral    Patient Name:  Roz Batista      YOB: 1980    Diagnosis:    1. Adhesive capsulitis of left shoulder    2. Trapezius muscle spasm      Precautions:     [x] Evaluate and Treat    Post Op Instructions:  [] Continuous passive motion (CPM) [] Elbow ROM  [x] Exercise in plane of scapula  []  Strengthening     [] Pulley and instruction   [x] Home exercise program (copy to patient)   [] Sling when arm at risk  [] Sling or brace at all times   [] AAROM: Forward elevation to  140            [] AAROM: External rotation  To  40    [] Isometric external rotator strengthening [] AAROM: internal rotation: up the back  [x] Isometric abductor strengthening  [] AAROM: Internal abduction   [] Isometric internal rotator strengthening [] AAROM: cross-body adduction             Stretching:     Strengthening:  [] Four quadrant (FE, ER, IR, CBA)  [] Rotator cuff (ER, IR, Abd)  [] Forward Elevation    [] External Rotators     [] External Rotation    [] Internal Rotators  [] Internal Rotation: up/back   [] Abductors     [] Internal Rotation: supine in abduction  [] Sleeper Stretch    [] Flexors  [] Cross-body abduction    [] Extensors  [] Pendulum (FE, Abd/Add, cw/ccw)  [x] Scapular Stabilizers   [] Wall-walking (FE, Abd)        [x] Shoulder shrugs     [] Table slides (FE)                [x] Rhomboid pinch  [] Elbow (flex, ext, pron, sup)        [] Lat.  Pull downs     [] Medial epicondylitis program       [] Forward punch   [] Lateral epicondylitis program       [] Internal rotators     [] Progressive resistive exercises  [] Bench Press        [] Bench press plus  Activities:     [] Lateral pull-downs  [] Rowing     [] Progressive two-hand supine press  [] Stepper/Exercise bike   [] Biceps: curls/supination  [] Swimming  [] Water exercises    Modalities:     Return to Sport:  [x] Of Choice      [] Plyometrics  [] Ultrasound     [] Rhythmic stabilization  [] Iontophoresis [] Core strengthening   [] Moist heat     [] Sports specific program:   [] Massage         [x] Cryotherapy      [] Electrical stimulation     [] Paraffin  [] Whirlpool  [] TENS    [x] Home exercise program (copy to patient). Perform exercises for:   15     minutes    3      times/day  [x] Supervised physical therapy  Frequency: []  1x week  [x] 2x week  [] 3x week  [] Other:   Duration: [] 2 weeks   [] 4 weeks  [x] 6 weeks  [] Other:     Additional Instructions:     Trapezius stretching/strengthening program for muscle spasms    Sincerely,    Rafi Dang MD [facility]   [address] [place], [address]  Email: [email or web address]  Office: [phone]    05/27/21  9:43 AM

## 2021-05-27 NOTE — PROGRESS NOTES
Chief Complaint    Shoulder Pain (F/U LEFT SHOULDER. F/U CORTISONE INJ 4/28/2021)      History of Present Illness:  Lokesh Dorman is a pleasant, 47 y.o., male, here today for follow up of left shoulder adhesive capsulitis and about 4 weeks after left shoulder cortisone injection of the glenohumeral space subacromial space and AC joint. He has at least 80% relief. He shoes horses for living. He has done some physical therapy. His motion has significantly improved with respect to the left shoulder. Still has some right-sided paraspinal trapezius and left dorsi muscle spasms on the contralateral side. He reports no new injuries or setbacks. Medical History:  Patient's medications, allergies, past medical, surgical, social and family histories were reviewed and updated as appropriate. No notes on file    Review of Systems  A 14 point review of systems was completed by the patient on and is available in the media section of the scanned medical record and was reviewed on 5/27/2021. The review is negative with the exception of those things mentioned in the HPI and Past Medical History    Vital Signs: There were no vitals filed for this visit. General/Appearance: Alert and oriented and in no apparent distress. Skin:  There are no skin lesions, cellulitis, or extreme edema. The patient has warm and well-perfused Bilateral upper extremities with brisk capillary refill. Left shoulder Exam:  Inspection:  No gross deformities, no signs of infection. Palpation: No tenderness    Active Range of Motion: Forward Elevation 175, Abduction 175, External Rotation 60, Internal Rotation T5    Passive Range of Motion:same    Strength:  External Rotation 5/5, Internal Rotation 5/5, Supraspinatus 5/5, Champagne Toast 5/5    Special Tests:   No Gianni muscle deformity.     Neurovascular: Sensation to light touch is intact, no motor deficits, palpable radial pulses 2+    Radiology:       No new XR obtained at this time.        Assessment :  Mr. Gina Main is a pleasant, 47 y.o. patient who is dramatically improved post cortisone injection into the left shoulder and AC joint for adhesive capsulitis. He has some paraspinal trapezius muscle spasm in the other side which I think we can benefit from further conditioning      Impression:  Encounter Diagnoses   Name Primary?  Adhesive capsulitis of left shoulder Yes    Trapezius muscle spasm        Office Procedures:  No orders of the defined types were placed in this encounter. Treatment Plan:  . We recommend He continue in physical therapy at our office for trapezius conditioning. I showed him some I, Y, and T trapezius conditioning exercises. .A new physical therapy letter was documented in EPIC today. We will see Valentín Baig back as needed . All questions were answered to patient's satisfaction and He was encouraged to call with any further questions or concerns. Matthew Abraham is in agreement with this plan. Sincerely,    Patrice Pisano MD [address] [place] [address] [facility], [address] [place] [address]  Email: [email or web address]  Office: [phone]    05/27/21  9:44 AM      The encounter with Gina Main was carried out by myself, Dr Janan Meckel, who personally examined the patient and reviewed the plan. This dictation was performed with a verbal recognition program (DRAGON) and it was checked for errors. It is possible that there are still dictated errors within this office note. If so, please bring any errors to my attention for an addendum. All efforts were made to ensure that this office note is accurate.

## 2021-07-01 ENCOUNTER — OFFICE VISIT (OUTPATIENT)
Dept: ORTHOPEDIC SURGERY | Age: 41
End: 2021-07-01
Payer: COMMERCIAL

## 2021-07-01 VITALS — WEIGHT: 195 LBS | HEIGHT: 68 IN | BODY MASS INDEX: 29.55 KG/M2

## 2021-07-01 DIAGNOSIS — M19.012 ARTHRITIS OF LEFT ACROMIOCLAVICULAR JOINT: Primary | ICD-10-CM

## 2021-07-01 DIAGNOSIS — M75.02 ADHESIVE CAPSULITIS OF LEFT SHOULDER: ICD-10-CM

## 2021-07-01 PROCEDURE — 4004F PT TOBACCO SCREEN RCVD TLK: CPT | Performed by: ORTHOPAEDIC SURGERY

## 2021-07-01 PROCEDURE — 99213 OFFICE O/P EST LOW 20 MIN: CPT | Performed by: ORTHOPAEDIC SURGERY

## 2021-07-01 PROCEDURE — G8427 DOCREV CUR MEDS BY ELIG CLIN: HCPCS | Performed by: ORTHOPAEDIC SURGERY

## 2021-07-01 PROCEDURE — G8417 CALC BMI ABV UP PARAM F/U: HCPCS | Performed by: ORTHOPAEDIC SURGERY

## 2021-07-01 NOTE — PROGRESS NOTES
Chief Complaint    Follow-up (check left shoulder)      History of Present Illness:  Aylin Layton is a pleasant, 47 y.o., male, here today for follow up of left shoulder adhesive capsulitis and about 8 weeks after a left shoulder cortisone injection of the glenohumeral space subacromial space and AC joint. He initially had at least 80% relief. He shoes horses for living. He has done some physical therapy. His motion has significantly improved with respect to the left shoulder. As he's been shoeing horses, he feels like he may have overdone it with his left shoulder in the past couple weeks. His now is pain is now localized to the top of the shoulder at the left [facility] joint worse with sleeping on that side and he admits that he is a bit of a side sleeper and keep his arm elevated and abductor. Medical History:  Patient's medications, allergies, past medical, surgical, social and family histories were reviewed and updated as appropriate. Chief Complaint    Follow-up (check left shoulder)      History of Present Illness:  Aylin Layton is a pleasant, 47 y.o., male, here today for follow up of left shoulder adhesive capsulitis and about 8 weeks after left shoulder cortisone injection of the glenohumeral space subacromial space and AC joint. He initially had at least 80% relief. He shoes horses for living. He has done some physical therapy. His motion has significantly improved with respect to the left shoulder. Issues worsen as he feels like he may have overdone it with his left shoulder. His now is pain is now localized to the top of the shoulder at the left [facility] joint worse with sleeping on that side and he admits that he is a bit of a side sleeper and keep his arm elevated and abductor. Medical History:  Patient's medications, allergies, past medical, surgical, social and family histories were reviewed and updated as appropriate.     No notes on file    Review of Systems  A 14 point review of systems was completed by the patient on and is available in the media section of the scanned medical record and was reviewed on 7/1/2021. The review is negative with the exception of those things mentioned in the HPI and Past Medical History    Vital Signs: There were no vitals filed for this visit. General/Appearance: Alert and oriented and in no apparent distress. Skin:  There are no skin lesions, cellulitis, or extreme edema. The patient has warm and well-perfused Bilateral upper extremities with brisk capillary refill. Left shoulder Exam:  Inspection:  No gross deformities, no signs of infection. Palpation: Severely tender at the [facility] joint    Active Range of Motion: Forward Elevation 175, Abduction 175, External Rotation 60, Internal Rotation T5    Passive Range of Motion:same    Strength:  External Rotation 5/5, Internal Rotation 5/5, Supraspinatus 5/5, Champagne Toast 5/5    Special Tests:   No Gianni muscle deformity. Pain with cross body abduction and Thaxton's test    Neurovascular: Sensation to light touch is intact, no motor deficits, palpable radial pulses 2+    Radiology:       No new XR obtained at this time. Assessment :  Mr. Lily Montalvo is a pleasant, 47 y.o. patient who is dramatically improved post cortisone injection into the left shoulder and AC joint for adhesive capsulitis. I believe Genoveva Campos is resolving his adhesive capsulitis as his range of motion is terrific. However he has some residual pain at the [facility] joint related to an x-ray diagnosis of AC joint arthritis which is moderate      Impression:  Encounter Diagnosis   Name Primary?  Arthritis of left acromioclavicular joint Yes       Office Procedures:  No orders of the defined types were placed in this encounter. Treatment Plan: Blanka Jesus My recommendation is continued activity modification, and now a prescription for a topical medicated cream to be applied along the area of the [facility] joint.   He should continue his home exercise program for cuff conditioning. We briefly discussed the merits of arthroscopic surgery and a distal clavicle resection. However we still have some time before deciding on that. I want to see him back in 1 month for surveillance. We will see Yanci Parks back as needed . All questions were answered to patient's satisfaction and He was encouraged to call with any further questions or concerns. Tez Alvarenga is in agreement with this plan. Sincerely,    Jones Melgoza MD [address] [facility]   [address] [place], [facility]  Email: [email or web address]  Office: [phone]    07/01/21  10:16 AM      The encounter with Kayla Ortiz was carried out by myself, Dr Breana Dasilva, who personally examined the patient and reviewed the plan. This dictation was performed with a verbal recognition program (DRAGON) and it was checked for errors. It is possible that there are still dictated errors within this office note. If so, please bring any errors to my attention for an addendum. All efforts were made to ensure that this office note is accurate. Review of Systems  A 14 point review of systems was completed by the patient on and is available in the media section of the scanned medical record and was reviewed on 7/2/2021. The review is negative with the exception of those things mentioned in the HPI and Past Medical History    Vital Signs: There were no vitals filed for this visit. General/Appearance: Alert and oriented and in no apparent distress. Skin:  There are no skin lesions, cellulitis, or extreme edema. The patient has warm and well-perfused Bilateral upper extremities with brisk capillary refill. Left shoulder Exam:  Inspection:  No gross deformities, no signs of infection.     Palpation: Severely tender at the [facility] joint    Active Range of Motion: Forward Elevation 175, Abduction 175, External Rotation 60, Internal Rotation T5    Passive Range of Motion:same    Strength:  External Rotation 5/5, Internal Rotation 5/5, Supraspinatus 5/5, Champagne Toast 5/5    Special Tests:   No Gianni muscle deformity. Pain with cross body abduction and [place]'s test    Neurovascular: Sensation to light touch is intact, no motor deficits, palpable radial pulses 2+    Radiology:       No new XR obtained at this time. Assessment :  Mr. Jazmine Clayton is a pleasant, 47 y.o. patient who is dramatically improved post cortisone injection into the left shoulder and AC joint for adhesive capsulitis. I believe Meenu Salazar is resolving his adhesive capsulitis as his range of motion is terrific. However he has some residual pain at the [facility] joint related to an x-ray diagnosis of AC joint arthritis which is moderate in its degree. Impression:  Encounter Diagnosis   Name Primary?  Arthritis of left acromioclavicular joint Yes       Office Procedures:  No orders of the defined types were placed in this encounter. Treatment Plan: Marleny Abebe My recommendation is continued activity modification, and now a prescription for a topical medicated cream to be applied along the area of the [facility] joint. He should continue his home exercise program for cuff conditioning. We briefly discussed the merits of arthroscopic surgery and a distal clavicle resection. However we still have plenty of time before deciding on that. I want to see him back in 1 month for surveillance. We will see Meenu Salazar back as needed . All questions were answered to patient's satisfaction and He was encouraged to call with any further questions or concerns. Mark Hernandez is in agreement with this plan.     Sincerely,    Devante Villanueva MD [facility] [facility]   [address], [facility] [address]  Email: [email or web address]  Office: [phone]    07/02/21  2:21 PM      The encounter with Aylin Layton was carried out by myself, Dr Lindsey Soliman, who personally examined the patient and reviewed the plan. This dictation was performed with a verbal recognition program (DRAGON) and it was checked for errors. It is possible that there are still dictated errors within this office note. If so, please bring any errors to my attention for an addendum. All efforts were made to ensure that this office note is accurate.

## 2021-07-02 ENCOUNTER — TELEPHONE (OUTPATIENT)
Dept: ORTHOPEDIC SURGERY | Age: 41
End: 2021-07-02

## 2021-07-02 NOTE — TELEPHONE ENCOUNTER
General Question     Subject: BIOMED CREAM FOR SHOULDER IS NOT COVERED BY INS AND WOULD LIKE TO KNOW IF THERE IS AN ALTERNATIVE, PATIENT USES [address] PHARMACY  Patient and /or Facility Request: PATIENT  Contact Number: [phone]

## 2021-07-06 NOTE — TELEPHONE ENCOUNTER
S/w patient regarding medication. Explained medication can be changed to self pay option if he wishes. Also explained he could get Voltaren Gel over the counter at a drug store. He notes he will weigh his options and call us back with a decision.

## 2021-07-14 DIAGNOSIS — F32.5 MAJOR DEPRESSION IN COMPLETE REMISSION (HCC): ICD-10-CM

## 2021-07-14 DIAGNOSIS — F31.78 BIPOLAR 1 DISORDER, MIXED, FULL REMISSION (HCC): ICD-10-CM

## 2021-07-14 DIAGNOSIS — E55.9 VITAMIN D DEFICIENCY: ICD-10-CM

## 2021-07-15 RX ORDER — ZIPRASIDONE HYDROCHLORIDE 40 MG/1
CAPSULE ORAL
Qty: 30 CAPSULE | Refills: 0 | Status: SHIPPED | OUTPATIENT
Start: 2021-07-15 | End: 2021-08-04 | Stop reason: SDUPTHER

## 2021-07-16 ENCOUNTER — TELEPHONE (OUTPATIENT)
Dept: FAMILY MEDICINE CLINIC | Age: 41
End: 2021-07-16

## 2021-07-16 DIAGNOSIS — G40.209 PARTIAL SYMPTOMATIC EPILEPSY WITH COMPLEX PARTIAL SEIZURES, NOT INTRACTABLE, WITHOUT STATUS EPILEPTICUS (HCC): ICD-10-CM

## 2021-07-16 DIAGNOSIS — F31.78 BIPOLAR 1 DISORDER, MIXED, FULL REMISSION (HCC): ICD-10-CM

## 2021-07-16 DIAGNOSIS — F40.10 SOCIAL ANXIETY DISORDER: ICD-10-CM

## 2021-07-16 DIAGNOSIS — R56.9 SEIZURE (HCC): Primary | ICD-10-CM

## 2021-07-16 DIAGNOSIS — F32.5 MAJOR DEPRESSION IN COMPLETE REMISSION (HCC): ICD-10-CM

## 2021-07-16 NOTE — TELEPHONE ENCOUNTER
Call patient. We cannot do a referral unless we know what the diagnosis is. It is also good to know that that physician is in your insurance network otherwise your insurance will deny it.

## 2021-07-16 NOTE — TELEPHONE ENCOUNTER
----- Message from [facility] (ENDY ANTOINE) sent at 7/16/2021 10:47 AM EDT -----  Subject: Message to Provider    QUESTIONS  Information for Provider? pt is needing a referral for Dr. Ana Holbrook  ---------------------------------------------------------------------------  --------------  [address]  What is the best way for the office to contact you? OK to leave message on   voicemail  Preferred Call Back Phone Number? [phone]  ---------------------------------------------------------------------------  --------------  SCRIPT ANSWERS  Relationship to Patient?  Self

## 2021-07-19 NOTE — TELEPHONE ENCOUNTER
Seizure  -     Ambulatory referral to Neurology  Partial symptomatic epilepsy with complex partial seizures, not intractable, without status epilepticus ([facility] [address]  -     Ambulatory referral to Neurology  Referral done for these 2 diagnoses. Social anxiety disorder  Major depression in complete remission ([facility] [address]  Bipolar 1 disorder, mixed, full [facility] ([facility]  While the neurologist needs to know that his seizure medicines like clonazepam and Lamictal may also be for other issues neurologists generally do not treat these other issues. He would need referral to psychiatry to deal with these other issues if he is not responding to treatment.

## 2021-07-19 NOTE — TELEPHONE ENCOUNTER
CALLED AND SPOKE TO PATIENT MOM AND SHE STATES HE IS IN PATIENT NETWORK AND WAS TOLD BY DR. PUGA THAT PATIENT NEEDS TO SEE A NEUROLOGIST FOR HIS SEIZURES, AND MENTAL HEALTH ISSUES. REFERRAL PENDED.  SC

## 2021-07-19 NOTE — TELEPHONE ENCOUNTER
PLACED REFERRAL AND INFO IN FAX BIN TO BE FAXED BY FRONT OFFICE. CALLED AND SPOKE TO PATIENT MOM TO LET HER KNOW THIS WAS DONE.  SC

## 2021-08-04 ENCOUNTER — OFFICE VISIT (OUTPATIENT)
Dept: FAMILY MEDICINE CLINIC | Age: 41
End: 2021-08-04
Payer: COMMERCIAL

## 2021-08-04 VITALS
SYSTOLIC BLOOD PRESSURE: 120 MMHG | OXYGEN SATURATION: 97 % | HEIGHT: 68 IN | BODY MASS INDEX: 31.34 KG/M2 | WEIGHT: 206.8 LBS | DIASTOLIC BLOOD PRESSURE: 80 MMHG | HEART RATE: 77 BPM | RESPIRATION RATE: 20 BRPM

## 2021-08-04 DIAGNOSIS — G40.209 PARTIAL SYMPTOMATIC EPILEPSY WITH COMPLEX PARTIAL SEIZURES, NOT INTRACTABLE, WITHOUT STATUS EPILEPTICUS (HCC): Chronic | ICD-10-CM

## 2021-08-04 DIAGNOSIS — F40.10 SOCIAL ANXIETY DISORDER: ICD-10-CM

## 2021-08-04 DIAGNOSIS — T50.905A EXTRAPYRAMIDAL MOVEMENT DISORDER, DRUG-INDUCED: ICD-10-CM

## 2021-08-04 DIAGNOSIS — E78.00 HYPERCHOLESTEROLEMIA: ICD-10-CM

## 2021-08-04 DIAGNOSIS — I10 ESSENTIAL HYPERTENSION: ICD-10-CM

## 2021-08-04 DIAGNOSIS — E55.9 VITAMIN D DEFICIENCY: ICD-10-CM

## 2021-08-04 DIAGNOSIS — R79.83 HOMOCYSTEINEMIA: ICD-10-CM

## 2021-08-04 DIAGNOSIS — F32.5 MAJOR DEPRESSION IN COMPLETE REMISSION (HCC): ICD-10-CM

## 2021-08-04 DIAGNOSIS — F31.78 BIPOLAR 1 DISORDER, MIXED, FULL REMISSION (HCC): ICD-10-CM

## 2021-08-04 DIAGNOSIS — G25.89 EXTRAPYRAMIDAL MOVEMENT DISORDER, DRUG-INDUCED: ICD-10-CM

## 2021-08-04 DIAGNOSIS — K21.9 GASTROESOPHAGEAL REFLUX DISEASE, UNSPECIFIED WHETHER ESOPHAGITIS PRESENT: ICD-10-CM

## 2021-08-04 PROCEDURE — G8427 DOCREV CUR MEDS BY ELIG CLIN: HCPCS | Performed by: FAMILY MEDICINE

## 2021-08-04 PROCEDURE — 4004F PT TOBACCO SCREEN RCVD TLK: CPT | Performed by: FAMILY MEDICINE

## 2021-08-04 PROCEDURE — G8417 CALC BMI ABV UP PARAM F/U: HCPCS | Performed by: FAMILY MEDICINE

## 2021-08-04 PROCEDURE — 99214 OFFICE O/P EST MOD 30 MIN: CPT | Performed by: FAMILY MEDICINE

## 2021-08-04 RX ORDER — TRIHEXYPHENIDYL HYDROCHLORIDE 2 MG/1
2 TABLET ORAL 2 TIMES DAILY
Qty: 60 TABLET | Refills: 5 | Status: SHIPPED | OUTPATIENT
Start: 2021-08-04 | End: 2022-02-02 | Stop reason: SDUPTHER

## 2021-08-04 RX ORDER — UREA 10 %
800 LOTION (ML) TOPICAL DAILY
Qty: 30 TABLET | Refills: 11 | Status: SHIPPED | OUTPATIENT
Start: 2021-08-04 | End: 2022-08-09

## 2021-08-04 RX ORDER — NADOLOL 20 MG/1
TABLET ORAL
Qty: 45 TABLET | Refills: 1 | Status: SHIPPED | OUTPATIENT
Start: 2021-08-04 | End: 2022-03-02

## 2021-08-04 RX ORDER — CLONAZEPAM 2 MG/1
2 TABLET ORAL 2 TIMES DAILY PRN
Qty: 60 TABLET | Refills: 1 | Status: SHIPPED | OUTPATIENT
Start: 2021-08-04 | End: 2022-02-02 | Stop reason: SDUPTHER

## 2021-08-04 RX ORDER — CLONAZEPAM 1 MG/1
1 TABLET ORAL 2 TIMES DAILY
Qty: 60 TABLET | Refills: 2 | Status: SHIPPED | OUTPATIENT
Start: 2021-08-04 | End: 2021-10-28 | Stop reason: SDUPTHER

## 2021-08-04 RX ORDER — LAMOTRIGINE 200 MG/1
TABLET ORAL
Qty: 60 TABLET | Refills: 5 | Status: SHIPPED | OUTPATIENT
Start: 2021-08-04 | End: 2022-07-25

## 2021-08-04 RX ORDER — ATORVASTATIN CALCIUM 10 MG/1
10 TABLET, FILM COATED ORAL DAILY
Qty: 30 TABLET | Refills: 11 | Status: SHIPPED | OUTPATIENT
Start: 2021-08-04 | End: 2022-07-25

## 2021-08-04 RX ORDER — AMLODIPINE BESYLATE 2.5 MG/1
TABLET ORAL
Qty: 30 TABLET | Refills: 2 | Status: SHIPPED | OUTPATIENT
Start: 2021-08-04 | End: 2021-10-08

## 2021-08-04 RX ORDER — ZIPRASIDONE HYDROCHLORIDE 40 MG/1
CAPSULE ORAL
Qty: 30 CAPSULE | Refills: 2 | Status: SHIPPED | OUTPATIENT
Start: 2021-08-04 | End: 2022-01-07 | Stop reason: SDUPTHER

## 2021-08-04 RX ORDER — OMEPRAZOLE 20 MG/1
20 CAPSULE, DELAYED RELEASE ORAL
Qty: 30 CAPSULE | Refills: 2 | Status: SHIPPED | OUTPATIENT
Start: 2021-08-04 | End: 2021-11-29

## 2021-08-04 RX ORDER — GABAPENTIN 400 MG/1
CAPSULE ORAL
Qty: 120 CAPSULE | Refills: 2 | Status: SHIPPED | OUTPATIENT
Start: 2021-08-04 | End: 2021-11-08

## 2021-08-04 RX ORDER — ZIPRASIDONE HYDROCHLORIDE 60 MG/1
60 CAPSULE ORAL 2 TIMES DAILY WITH MEALS
Qty: 60 CAPSULE | Refills: 2 | Status: SHIPPED | OUTPATIENT
Start: 2021-08-04 | End: 2021-11-08

## 2021-08-04 ASSESSMENT — ENCOUNTER SYMPTOMS
WHEEZING: 0
CHOKING: 0
COUGH: 1
SHORTNESS OF BREATH: 0
CHEST TIGHTNESS: 0

## 2021-08-04 NOTE — PROGRESS NOTES
Saundra Oleary (:  1980) is a 47 y.o. male,Established patient, here for evaluation of the following chief complaint(s):  Hypertension (FOLLOW UP ON HTN) and Seizures (FOLLOW UP ON SEIZURE DISORDER)       ASSESSMENT/PLAN:  242    Patient Instructions   Sarai Lim was seen today for hypertension and seizures. Diagnoses and all orders for this visit:    Essential hypertension  -     amLODIPine (NORVASC) 2.5 MG tablet; TAKE 1 TABLET DAILY  -     nadolol (CORGARD) 20 MG tablet; 1/2 tab a daily by mouth For high blood pressure.  -     Good control.  -     Continue meds and lifestyle control. Bipolar 1 disorder, mixed, full remission (HCC)  -     ziprasidone (GEODON) 40 MG capsule; Take 1 capsule by mouth daily Take with 60 mg at dinner  -     ziprasidone (GEODON) 60 MG capsule; Take 1 capsule by mouth 2 times daily (with meals) Take a 60 mg with a 40 mg at dinner  -     lamoTRIgine (LAMICTAL) 200 MG tablet; TAKE 2 TABLETS 1 TIME DAILY AT DINNER    Major depression in complete remission (HCC)  -     ziprasidone (GEODON) 40 MG capsule; Take 1 capsule by mouth daily Take with 60 mg at dinner  -     ziprasidone (GEODON) 60 MG capsule; Take 1 capsule by mouth 2 times daily (with meals) Take a 60 mg with a 40 mg at dinner    Hypercholesterolemia  -     atorvastatin (LIPITOR) 10 MG tablet; Take 1 tablet by mouth daily    Gastroesophageal reflux disease, unspecified whether esophagitis present  -     omeprazole (PRILOSEC) 20 MG delayed release capsule; Take 1 capsule by mouth every morning (before breakfast)    Partial symptomatic epilepsy with complex partial seizures, not intractable, without status epilepticus (HCC)  -     gabapentin (NEURONTIN) 400 MG capsule; Take 4 capsules nightly for sleep  -     clonazePAM (KLONOPIN) 1 MG tablet; Take 1 tablet by mouth 2 times daily for 90 days. -     clonazePAM (KLONOPIN) 2 MG tablet; Take 1 tablet by mouth 2 times daily as needed (SEIZURES) for up to 91 days.  TAKE TO ABORT SEIZURES    Social anxiety disorder  -     clonazePAM (KLONOPIN) 1 MG tablet; Take 1 tablet by mouth 2 times daily for 90 days. Extrapyramidal movement disorder, drug-induced  -     trihexyphenidyl (ARTANE) 2 MG tablet; Take 1 tablet by mouth 2 times daily    Vitamin D deficiency  -     Cholecalciferol (VITAMIN D3) 125 MCG (5000 UT) CAPS; Indications: Vitamin D Deficiency Take 1 capsule daily by mouth 5 days/week i.e. skip Monday and Thursday    Homocysteinemia ([facility] [address]  -     folic acid (FOLVITE) 457 MCG tablet; Take 1 tablet by mouth daily    Return in about 3 months (around 11/4/2021) for Hypertension, Anxiety, Depression, BAD. Subjective   SUBJECTIVE/OBJECTIVE:  Chief Complaint   Patient presents with    Hypertension     FOLLOW UP ON HTN    Seizures     FOLLOW UP ON SEIZURE DISORDER   159  HPI    Essential hypertension  Is not checking. Has a cuff available at home. Has quit drinking by tapering. Cut back smoking to 1/2 ppd. Is not watching his salt as close as was. Is living on frozen dinners. Bipolar 1 disorder, mixed, full remission (HCC)/ Major depression in complete remission ([facility] [address]  With his split up from GF he was depressed but better now. Thinks was the drinking in part. Hypercholesterolemia  Is not really watching diet. Mother buys food. Foods high = eggs,thurston and fatty. Air fryer better. Gastroesophageal reflux disease, unspecified whether esophagitis present  No sx. Partial symptomatic epilepsy with complex partial seizures, not intractable, without status epilepticus ([facility] [address]  No full blown sz. Had come close with stress of working with the public. Referred to neurology. No appointment yet. Social anxiety disorder  This is the 1st job he had an interview for. Extrapyramidal movement disorder, drug-induced  No current sx. Vitamin D deficiency  Taking vitamin  Homocysteinemia ([facility] [address]  On folic acid 220 mcg. Review of Systems   Respiratory: Positive for cough ( smokers ). Negative for choking, chest tightness, shortness of breath and wheezing. Cardiovascular: Negative for chest pain, palpitations and leg swelling. Psychiatric/Behavioral: Positive for agitation, behavioral problems and dysphoric mood. Negative for confusion, decreased concentration, self-injury, sleep disturbance and suicidal ideas. The patient is nervous/anxious. The patient is not hyperactive. Prior to Admission medications    Medication Sig Start Date End Date Taking? Authorizing Provider   Cholecalciferol (VITAMIN D3) 125 MCG (5000 UT) CAPS Indications: Vitamin D Deficiency Take 1 capsule daily by mouth 5 days/week i.e. skip Monday and Thursday 7/15/21  Yes Matteo Damon, DO   ziprasidone (GEODON) 40 MG capsule Take 1 capsule by mouth daily Take with 60 mg at dinner 7/15/21  Yes Matteo Damon, DO   Diclofenac Sodium POWD Apply 2 g topically 4 times daily Formula #5D Diclo 3% Lido 2% Prilo 2% 7/1/21  Yes Bess Kowalski MD   atorvastatin (LIPITOR) 10 MG tablet Take 1 tablet by mouth daily 4/21/21  Yes Matteo Damon, DO   trihexyphenidyl (ARTANE) 2 MG tablet Take 1 tablet by mouth 2 times daily 4/21/21  Yes Matteo Damon, DO   gabapentin (NEURONTIN) 400 MG capsule Take 4 capsules nightly for sleep 4/1/21 8/4/21 Yes Jayesh Mendoza, DO   clonazePAM (KLONOPIN) 1 MG tablet Take 1 tablet by mouth 2 times daily for 90 days. 4/1/21 8/4/21 Yes Jayesh Mendoza, DO   amLODIPine (NORVASC) 2.5 MG tablet TAKE 1 TABLET DAILY 4/1/21  Yes Matteo Damon, DO   omeprazole (PRILOSEC) 20 MG delayed release capsule Take 1 capsule by mouth every morning (before breakfast) 4/1/21  Yes Matteo Damon, DO   ziprasidone (GEODON) 60 MG capsule Take 1 capsule by mouth 2 times daily (with meals) Take a 60 mg with a 40 mg at dinner 4/1/21  Yes Matteo Damon, DO   nadolol (CORGARD) 20 MG tablet 1/2 tab a daily by mouth For high blood pressure.  4/1/21  Yes Matteo Damon, DO   lamoTRIgine (LAMICTAL) 200 MG tablet TAKE 2 TABLETS 1 TIME DAILY AT [facility] 4/1/21  Yes Inniswoldenmanuel Duarte, DO   diclofenac sodium (VOLTAREN) 1 % GEL Apply 4 g topically 4 times daily as needed for Pain   Yes Historical Provider, MD   clonazePAM (KLONOPIN) 2 MG tablet Take 1 tablet by mouth 2 times daily as needed (SEIZURES) for up to 91 days. TAKE TO ABORT SEIZURES 3/11/21 8/4/21 Yes Inniswold Olive, DO   traZODone (DESYREL) 100 MG tablet Take 1/2-1 tablets by mouth nightly 3/10/21  Yes Inniswold Olive, DO   fexofenadine TY [facility]) 180 MG tablet Take 1 tablet by mouth daily 9/25/20  Yes Inniswold Olive, DO   folic acid (FOLVITE) 716 MCG tablet Take 1 tablet by mouth daily 9/25/20  Yes Inniswold Olive, DO   acetaminophen (TYLENOL) 500 MG tablet Take 2 tablets by mouth 3 times daily as needed for Pain 2/15/20  Yes Nicolas Park, DO   GLUCOSAMINE-CHONDROITIN ER PO Take by mouth 2 times daily   Yes Historical Provider, MD   guaiFENesin (MUCINEX) 600 MG extended release tablet Take 600 mg by mouth daily    Yes Historical Provider, MD   Melatonin 5 MG TABS tablet Take 1 tablet by mouth nightly   Yes Historical Provider, MD   therapeutic multivitamin-minerals (THERAGRAN-M) tablet Take 1 tablet by mouth daily. Yes Historical Provider, MD        Objective   Physical Exam  Vitals and nursing note reviewed. Constitutional:       General: He is not in acute distress. Appearance: He is well-developed. He is obese. He is not ill-appearing or diaphoretic. Eyes:      General: No scleral icterus. Right eye: No discharge. Left eye: No discharge. Conjunctiva/sclera: Conjunctivae normal.   Neck:      Thyroid: No thyroid mass or thyromegaly. Vascular: No carotid bruit or JVD. Trachea: Trachea and phonation normal. No tracheal tenderness or tracheal deviation. Cardiovascular:      Rate and Rhythm: Normal rate and regular rhythm. No extrasystoles are present. Heart sounds: Normal heart sounds, S1 normal and S2 normal. Heart sounds not distant. No murmur heard.    No systolic murmur is present. No diastolic murmur is present. No friction rub. No gallop. No S3 or S4 sounds. Pulmonary:      Effort: Pulmonary effort is normal. No respiratory distress. Breath sounds: Normal breath sounds and air entry. No stridor. No decreased breath sounds, wheezing, rhonchi or rales. Musculoskeletal:      Cervical back: Neck supple. Right lower leg: No edema. Left lower leg: No edema. Lymphadenopathy:      Head:      Right side of head: No submandibular or tonsillar adenopathy. Left side of head: No submandibular or tonsillar adenopathy. Cervical: No cervical adenopathy. Right cervical: No superficial, deep or posterior cervical adenopathy. Left cervical: No superficial, deep or posterior cervical adenopathy. Skin:     General: Skin is warm and dry. Coloration: Skin is not pale. Neurological:      Mental Status: He is alert. Deep Tendon Reflexes:      Reflex Scores:       Patellar reflexes are 2+ on the right side and 2+ on the left side. Psychiatric:         Attention and Perception: Attention and perception normal.         Mood and Affect: Mood and affect normal. Mood is not anxious or depressed. Speech: Speech normal. Speech is not rapid and pressured. Behavior: Behavior normal. Behavior is cooperative.          Cognition and Memory: Cognition and memory normal.         Judgment: Judgment normal.        Vitals:    08/04/21 1316   BP: 120/80   Site: Right Upper Arm   Position: Sitting   Cuff Size: Medium Adult   Pulse: 77   Resp: 20   SpO2: 97%   Weight: 206 lb 12.8 oz (93.8 kg)   Height: 5' 8\" (1.727 m)     BP Readings from Last 3 Encounters:   08/04/21 120/80   03/23/20 112/72   02/15/20 117/78     Pulse Readings from Last 3 Encounters:   08/04/21 77   03/23/20 105   02/15/20 79     Wt Readings from Last 3 Encounters:   08/04/21 206 lb 12.8 oz (93.8 kg)   07/01/21 195 lb (88.5 kg)   05/27/21 190 lb (86.2 kg)     Body mass index is 31.44 kg/m². On this date 8/4/2021 I have spent 43 minutes reviewing previous notes, test results and face to face with the patient discussing the diagnosis and importance of compliance with the treatment plan as well as documenting on the day of the visit. An electronic signature was used to authenticate this note.     --Abelardo Milner, DO

## 2021-08-04 NOTE — PATIENT INSTRUCTIONS
Shakira Shin was seen today for hypertension and seizures. Diagnoses and all orders for this visit:    Essential hypertension  -     amLODIPine (NORVASC) 2.5 MG tablet; TAKE 1 TABLET DAILY  -     nadolol (CORGARD) 20 MG tablet; 1/2 tab a daily by mouth For high blood pressure.  -     Good control.  -     Continue meds and lifestyle control. Bipolar 1 disorder, mixed, full remission (HCC)  -     ziprasidone (GEODON) 40 MG capsule; Take 1 capsule by mouth daily Take with 60 mg at dinner  -     ziprasidone (GEODON) 60 MG capsule; Take 1 capsule by mouth 2 times daily (with meals) Take a 60 mg with a 40 mg at dinner  -     lamoTRIgine (LAMICTAL) 200 MG tablet; TAKE 2 TABLETS 1 TIME DAILY AT DINNER    Major depression in complete remission (HCC)  -     ziprasidone (GEODON) 40 MG capsule; Take 1 capsule by mouth daily Take with 60 mg at dinner  -     ziprasidone (GEODON) 60 MG capsule; Take 1 capsule by mouth 2 times daily (with meals) Take a 60 mg with a 40 mg at dinner    Hypercholesterolemia  -     atorvastatin (LIPITOR) 10 MG tablet; Take 1 tablet by mouth daily    Gastroesophageal reflux disease, unspecified whether esophagitis present  -     omeprazole (PRILOSEC) 20 MG delayed release capsule; Take 1 capsule by mouth every morning (before breakfast)    Partial symptomatic epilepsy with complex partial seizures, not intractable, without status epilepticus (HCC)  -     gabapentin (NEURONTIN) 400 MG capsule; Take 4 capsules nightly for sleep  -     clonazePAM (KLONOPIN) 1 MG tablet; Take 1 tablet by mouth 2 times daily for 90 days. -     clonazePAM (KLONOPIN) 2 MG tablet; Take 1 tablet by mouth 2 times daily as needed (SEIZURES) for up to 91 days. TAKE TO ABORT SEIZURES    Social anxiety disorder  -     clonazePAM (KLONOPIN) 1 MG tablet; Take 1 tablet by mouth 2 times daily for 90 days. Extrapyramidal movement disorder, drug-induced  -     trihexyphenidyl (ARTANE) 2 MG tablet;  Take 1 tablet by mouth 2 times daily    Vitamin D deficiency  -     Cholecalciferol (VITAMIN D3) 125 MCG (5000 UT) CAPS; Indications: Vitamin D Deficiency Take 1 capsule daily by mouth 5 days/week i.e. skip Monday and Thursday    Homocysteinemia ([facility]  -     folic acid (FOLVITE) 224 MCG tablet;  Take 1 tablet by mouth daily

## 2021-08-18 ENCOUNTER — OFFICE VISIT (OUTPATIENT)
Dept: ORTHOPEDIC SURGERY | Age: 41
End: 2021-08-18
Payer: COMMERCIAL

## 2021-08-18 VITALS — WEIGHT: 206 LBS | BODY MASS INDEX: 31.22 KG/M2 | RESPIRATION RATE: 12 BRPM | HEIGHT: 68 IN

## 2021-08-18 DIAGNOSIS — M19.012 ARTHRITIS OF LEFT ACROMIOCLAVICULAR JOINT: Primary | ICD-10-CM

## 2021-08-18 DIAGNOSIS — M75.02 ADHESIVE CAPSULITIS OF LEFT SHOULDER: ICD-10-CM

## 2021-08-18 PROCEDURE — G8417 CALC BMI ABV UP PARAM F/U: HCPCS | Performed by: ORTHOPAEDIC SURGERY

## 2021-08-18 PROCEDURE — G8427 DOCREV CUR MEDS BY ELIG CLIN: HCPCS | Performed by: ORTHOPAEDIC SURGERY

## 2021-08-18 PROCEDURE — 4004F PT TOBACCO SCREEN RCVD TLK: CPT | Performed by: ORTHOPAEDIC SURGERY

## 2021-08-18 PROCEDURE — 99213 OFFICE O/P EST LOW 20 MIN: CPT | Performed by: ORTHOPAEDIC SURGERY

## 2021-08-18 NOTE — PROGRESS NOTES
Chief Complaint    Shoulder Pain (F/u left shoulder pain)      History of Present Illness:  Jhony Mathew is a pleasant, 46 y.o., male, here today for follow up of left shoulder adhesive capsulitis and about 12 weeks after a left shoulder cortisone injection of the glenohumeral space subacromial space and AC joint. He has been applying a topical medicated cream along his AC joint and this has been given him significant relief. He only notes morning stiffness of the along the [facility] joint which limbers up with motion throughout the day. His motion is significantly improved otherwise. He shoes horses for a living. Medical History:  Patient's medications, allergies, past medical, surgical, social and family histories were reviewed and updated as appropriate. No notes on file    Review of Systems  A 14 point review of systems was completed by the patient on and is available in the media section of the scanned medical record and was reviewed on 8/18/2021. The review is negative with the exception of those things mentioned in the HPI and Past Medical History    Vital Signs:  Vitals:    08/18/21 1034   Resp: 12       General/Appearance: Alert and oriented and in no apparent distress. Skin:  There are no skin lesions, cellulitis, or extreme edema. The patient has warm and well-perfused Bilateral upper extremities with brisk capillary refill. Left shoulder Exam:  Inspection:  No gross deformities, no signs of infection. Palpation: mild to moderately tender at the [facility] joint    Active Range of Motion: Forward Elevation 175, Abduction 175, External Rotation 60, Internal Rotation T5    Passive Range of Motion:same    Strength:  External Rotation 5/5, Internal Rotation 5/5, Supraspinatus 5/5, Champagne Toast 5/5    Special Tests:   No Gianni muscle deformity.   Pain with cross body abduction and Luverne's test    Neurovascular: Sensation to light touch is intact, no motor deficits, palpable radial pulses 2+    Radiology:       No new XR obtained at this time. Assessment :  Mr. Freedom Sheppard is a pleasant, 46 y.o. patient who is dramatically improved post cortisone injection into the left shoulder and AC joint for adhesive capsulitis. I believe Tiff Hsu is resolving his adhesive capsulitis as his range of motion is terrific. However he has some residual pain at the [facility] joint related to an x-ray diagnosis of AC joint arthritis which is moderate in its degree. Impression:  Encounter Diagnoses   Name Primary?  Arthritis of left acromioclavicular joint Yes    Adhesive capsulitis of left shoulder        Office Procedures:  No orders of the defined types were placed in this encounter. Treatment Plan: Bridgette Begum My recommendation is continued activity modification, continued use of the medicated cream as needed along the shoulder and AC joint, and avoiding any repetitive provocative positions of his AC joint. We once again discussed the natural history of AC joint arthritis and that it may ultimately \"burnout\" and that his symptoms may diminish. However should he have recurrent symptoms or worsening pain related to the [facility] joint, we briefly discussed the merits of arthroscopic surgery and a distal clavicle resection. However we still have plenty of time before deciding on that. I am happy to see him back as needed going forward. We will see Tiff Hsu back as needed . All questions were answered to patient's satisfaction and He was encouraged to call with any further questions or concerns. Tavia Mcgovern is in agreement with this plan. Sincerely,    Orville Jain MD [address] [facility]   [address] [place], [address]  Email: [email or web address]  Office: [phone]    08/18/21  1:12 PM      The encounter with Freedom Sheppard was carried out by myself, Dr Mart Ervin, who personally examined the patient and reviewed the plan. This dictation was performed with a verbal recognition program (DRAGON) and it was checked for errors. It is possible that there are still dictated errors within this office note. If so, please bring any errors to my attention for an addendum. All efforts were made to ensure that this office note is accurate.

## 2021-10-08 DIAGNOSIS — I10 ESSENTIAL HYPERTENSION: ICD-10-CM

## 2021-10-08 RX ORDER — AMLODIPINE BESYLATE 2.5 MG/1
TABLET ORAL
Qty: 30 TABLET | Refills: 0 | Status: SHIPPED | OUTPATIENT
Start: 2021-10-08 | End: 2022-01-07 | Stop reason: SDUPTHER

## 2021-10-28 ENCOUNTER — TELEPHONE (OUTPATIENT)
Dept: FAMILY MEDICINE CLINIC | Age: 41
End: 2021-10-28

## 2021-10-28 DIAGNOSIS — F40.10 SOCIAL ANXIETY DISORDER: ICD-10-CM

## 2021-10-28 DIAGNOSIS — G40.209 PARTIAL SYMPTOMATIC EPILEPSY WITH COMPLEX PARTIAL SEIZURES, NOT INTRACTABLE, WITHOUT STATUS EPILEPTICUS (HCC): Chronic | ICD-10-CM

## 2021-10-28 RX ORDER — CLONAZEPAM 1 MG/1
1 TABLET ORAL 2 TIMES DAILY
Qty: 60 TABLET | Refills: 0 | Status: SHIPPED | OUTPATIENT
Start: 2021-10-28 | End: 2022-01-07 | Stop reason: SDUPTHER

## 2021-10-28 NOTE — TELEPHONE ENCOUNTER
His appt isn't til Dec 3rd with Neurologist - they are needing a script sent for 1 month of the     Clonazepam 1 mg         [address] -  [phone]      Kevyn Jack @  [phone]

## 2021-10-28 NOTE — TELEPHONE ENCOUNTER
Patient has been taking more of his clonazepam 2 mg as well as taking his clonazepam 1 mg routinely twice a day. Needs a 3-month appointment for this refill. We will refill now but he should schedule an appointment with myself or the nurse practitioner in the practice either this month or very early next month. There is no guarantee that the neurologist will refill these medications. The purpose of the neurology visit is to follow-up on his history of seizures and determine the correct medications. Partial symptomatic epilepsy with complex partial seizures, not intractable, without status epilepticus (HCC)  -     clonazePAM (KLONOPIN) 1 MG tablet; Take 1 tablet by mouth 2 times daily for 30 days. Social anxiety disorder  -     clonazePAM (KLONOPIN) 1 MG tablet; Take 1 tablet by mouth 2 times daily for 30 days. Controlled Substance Monitoring:  Acute and Chronic Pain Monitoring:   RX Monitoring 10/28/2021   Attestation -   Periodic Controlled Substance Monitoring No signs of potential drug abuse or diversion identified.

## 2021-11-08 DIAGNOSIS — F31.78 BIPOLAR 1 DISORDER, MIXED, FULL REMISSION (HCC): ICD-10-CM

## 2021-11-08 DIAGNOSIS — F32.5 MAJOR DEPRESSION IN COMPLETE REMISSION (HCC): ICD-10-CM

## 2021-11-08 DIAGNOSIS — G40.209 PARTIAL SYMPTOMATIC EPILEPSY WITH COMPLEX PARTIAL SEIZURES, NOT INTRACTABLE, WITHOUT STATUS EPILEPTICUS (HCC): Chronic | ICD-10-CM

## 2021-11-08 RX ORDER — ZIPRASIDONE HYDROCHLORIDE 60 MG/1
60 CAPSULE ORAL 2 TIMES DAILY WITH MEALS
Qty: 60 CAPSULE | Refills: 0 | Status: SHIPPED | OUTPATIENT
Start: 2021-11-08 | End: 2022-01-04

## 2021-11-08 RX ORDER — GABAPENTIN 400 MG/1
CAPSULE ORAL
Qty: 120 CAPSULE | Refills: 0 | Status: SHIPPED | OUTPATIENT
Start: 2021-11-08 | End: 2022-01-07 | Stop reason: SDUPTHER

## 2021-11-08 NOTE — TELEPHONE ENCOUNTER
Me     KW    11/8/21 10:46 AM  Note  LAST VISIT 08/04/2021 DR. Gloria Rizo, NEXT VISIT 11/24/2021 DR. Gloria Rizo.

## 2021-11-29 DIAGNOSIS — K21.9 GASTROESOPHAGEAL REFLUX DISEASE, UNSPECIFIED WHETHER ESOPHAGITIS PRESENT: ICD-10-CM

## 2021-11-29 RX ORDER — OMEPRAZOLE 20 MG/1
20 CAPSULE, DELAYED RELEASE ORAL
Qty: 30 CAPSULE | Refills: 0 | Status: SHIPPED | OUTPATIENT
Start: 2021-11-29 | End: 2022-01-07 | Stop reason: SDUPTHER

## 2022-01-04 DIAGNOSIS — F32.5 MAJOR DEPRESSION IN COMPLETE REMISSION (HCC): ICD-10-CM

## 2022-01-04 DIAGNOSIS — F31.78 BIPOLAR 1 DISORDER, MIXED, FULL REMISSION (HCC): ICD-10-CM

## 2022-01-05 RX ORDER — ZIPRASIDONE HYDROCHLORIDE 60 MG/1
CAPSULE ORAL
Qty: 60 CAPSULE | Refills: 0 | Status: SHIPPED | OUTPATIENT
Start: 2022-01-05 | End: 2022-01-07 | Stop reason: SDUPTHER

## 2022-01-07 ENCOUNTER — VIRTUAL VISIT (OUTPATIENT)
Dept: FAMILY MEDICINE CLINIC | Age: 42
End: 2022-01-07
Payer: COMMERCIAL

## 2022-01-07 DIAGNOSIS — K21.9 GASTROESOPHAGEAL REFLUX DISEASE, UNSPECIFIED WHETHER ESOPHAGITIS PRESENT: ICD-10-CM

## 2022-01-07 DIAGNOSIS — Z71.6 ENCOUNTER FOR TOBACCO USE CESSATION COUNSELING: ICD-10-CM

## 2022-01-07 DIAGNOSIS — F51.04 PSYCHOPHYSIOLOGICAL INSOMNIA: ICD-10-CM

## 2022-01-07 DIAGNOSIS — G40.209 PARTIAL SYMPTOMATIC EPILEPSY WITH COMPLEX PARTIAL SEIZURES, NOT INTRACTABLE, WITHOUT STATUS EPILEPTICUS (HCC): Chronic | ICD-10-CM

## 2022-01-07 DIAGNOSIS — F32.5 MAJOR DEPRESSION IN COMPLETE REMISSION (HCC): ICD-10-CM

## 2022-01-07 DIAGNOSIS — Z72.0 TOBACCO ABUSE: ICD-10-CM

## 2022-01-07 DIAGNOSIS — E72.11 HOMOCYSTINURIA (HCC): ICD-10-CM

## 2022-01-07 DIAGNOSIS — I10 ESSENTIAL HYPERTENSION: ICD-10-CM

## 2022-01-07 DIAGNOSIS — F31.78 BIPOLAR 1 DISORDER, MIXED, FULL REMISSION (HCC): ICD-10-CM

## 2022-01-07 DIAGNOSIS — F40.10 SOCIAL ANXIETY DISORDER: Primary | ICD-10-CM

## 2022-01-07 PROCEDURE — G8417 CALC BMI ABV UP PARAM F/U: HCPCS | Performed by: NURSE PRACTITIONER

## 2022-01-07 PROCEDURE — 99213 OFFICE O/P EST LOW 20 MIN: CPT | Performed by: NURSE PRACTITIONER

## 2022-01-07 PROCEDURE — G8427 DOCREV CUR MEDS BY ELIG CLIN: HCPCS | Performed by: NURSE PRACTITIONER

## 2022-01-07 PROCEDURE — G8484 FLU IMMUNIZE NO ADMIN: HCPCS | Performed by: NURSE PRACTITIONER

## 2022-01-07 PROCEDURE — 4004F PT TOBACCO SCREEN RCVD TLK: CPT | Performed by: NURSE PRACTITIONER

## 2022-01-07 RX ORDER — GABAPENTIN 400 MG/1
CAPSULE ORAL
Qty: 120 CAPSULE | Refills: 2 | Status: SHIPPED | OUTPATIENT
Start: 2022-01-07 | End: 2022-02-02 | Stop reason: SDUPTHER

## 2022-01-07 RX ORDER — CLONAZEPAM 1 MG/1
1 TABLET ORAL 2 TIMES DAILY
Qty: 60 TABLET | Refills: 0 | Status: SHIPPED | OUTPATIENT
Start: 2022-01-07 | End: 2022-02-02 | Stop reason: SDUPTHER

## 2022-01-07 RX ORDER — VARENICLINE TARTRATE
KIT
Qty: 1 BOX | Refills: 0 | Status: SHIPPED | OUTPATIENT
Start: 2022-01-07 | End: 2022-04-01 | Stop reason: ALTCHOICE

## 2022-01-07 RX ORDER — ZIPRASIDONE HYDROCHLORIDE 60 MG/1
CAPSULE ORAL
Qty: 60 CAPSULE | Refills: 5 | Status: SHIPPED | OUTPATIENT
Start: 2022-01-07 | End: 2022-02-02 | Stop reason: SDUPTHER

## 2022-01-07 RX ORDER — OMEPRAZOLE 20 MG/1
20 CAPSULE, DELAYED RELEASE ORAL
Qty: 30 CAPSULE | Refills: 11 | Status: SHIPPED | OUTPATIENT
Start: 2022-01-07 | End: 2022-04-01 | Stop reason: SDUPTHER

## 2022-01-07 RX ORDER — TRAZODONE HYDROCHLORIDE 100 MG/1
TABLET ORAL
Qty: 30 TABLET | Refills: 0 | Status: SHIPPED | OUTPATIENT
Start: 2022-01-07 | End: 2022-02-02 | Stop reason: SDUPTHER

## 2022-01-07 RX ORDER — AMLODIPINE BESYLATE 2.5 MG/1
TABLET ORAL
Qty: 30 TABLET | Refills: 5 | Status: SHIPPED | OUTPATIENT
Start: 2022-01-07 | End: 2022-05-03 | Stop reason: SDUPTHER

## 2022-01-07 RX ORDER — ZIPRASIDONE HYDROCHLORIDE 40 MG/1
CAPSULE ORAL
Qty: 30 CAPSULE | Refills: 5 | Status: SHIPPED | OUTPATIENT
Start: 2022-01-07 | End: 2022-05-03 | Stop reason: SDUPTHER

## 2022-01-07 SDOH — ECONOMIC STABILITY: FOOD INSECURITY: WITHIN THE PAST 12 MONTHS, THE FOOD YOU BOUGHT JUST DIDN'T LAST AND YOU DIDN'T HAVE MONEY TO GET MORE.: NEVER TRUE

## 2022-01-07 SDOH — ECONOMIC STABILITY: FOOD INSECURITY: WITHIN THE PAST 12 MONTHS, YOU WORRIED THAT YOUR FOOD WOULD RUN OUT BEFORE YOU GOT MONEY TO BUY MORE.: NEVER TRUE

## 2022-01-07 SDOH — ECONOMIC STABILITY: TRANSPORTATION INSECURITY
IN THE PAST 12 MONTHS, HAS THE LACK OF TRANSPORTATION KEPT YOU FROM MEDICAL APPOINTMENTS OR FROM GETTING MEDICATIONS?: NO

## 2022-01-07 SDOH — ECONOMIC STABILITY: TRANSPORTATION INSECURITY
IN THE PAST 12 MONTHS, HAS LACK OF TRANSPORTATION KEPT YOU FROM MEETINGS, WORK, OR FROM GETTING THINGS NEEDED FOR DAILY LIVING?: NO

## 2022-01-07 ASSESSMENT — ENCOUNTER SYMPTOMS
SINUS PRESSURE: 0
COUGH: 0
CONSTIPATION: 0
CHEST TIGHTNESS: 0
EYE DISCHARGE: 0
SHORTNESS OF BREATH: 0
DIARRHEA: 0
ABDOMINAL DISTENTION: 0
COLOR CHANGE: 0
BACK PAIN: 0
SINUS PAIN: 0
NAUSEA: 0
ABDOMINAL PAIN: 0

## 2022-01-07 ASSESSMENT — PATIENT HEALTH QUESTIONNAIRE - PHQ9
SUM OF ALL RESPONSES TO PHQ9 QUESTIONS 1 & 2: 1
2. FEELING DOWN, DEPRESSED OR HOPELESS: 0
1. LITTLE INTEREST OR PLEASURE IN DOING THINGS: 1
SUM OF ALL RESPONSES TO PHQ QUESTIONS 1-9: 1

## 2022-01-07 ASSESSMENT — SOCIAL DETERMINANTS OF HEALTH (SDOH): HOW HARD IS IT FOR YOU TO PAY FOR THE VERY BASICS LIKE FOOD, HOUSING, MEDICAL CARE, AND HEATING?: NOT HARD AT ALL

## 2022-01-07 NOTE — PATIENT INSTRUCTIONS
Patient Education        Recovering From Depression: Care Instructions  Your Care Instructions     Taking good care of yourself is important as you recover from depression. In time, your symptoms will fade as your treatment takes hold. Do not give up. Instead, focus your energy on getting better. Your mood will improve. It just takes some time. Focus on things that can help you feel better, such as being with friends and family, eating well, and getting enough rest. But take things slowly. Do not do too much too soon. You will begin to feel better gradually. Follow-up care is a key part of your treatment and safety. Be sure to make and go to all appointments, and call your doctor if you are having problems. It's also a good idea to know your test results and keep a list of the medicines you take. How can you care for yourself at home? Be realistic  · If you have a large task to do, break it up into smaller steps you can handle, and just do what you can. · You may want to put off important decisions until your depression has lifted. If you have plans that will have a major impact on your life, such as marriage, divorce, or a job change, try to wait a bit. Talk it over with friends and loved ones who can help you look at the overall picture first.  · Reaching out to people for help is important. Do not isolate yourself. Let your family and friends help you. Find someone you can trust and confide in, and talk to that person. · Be patient, and be kind to yourself. Remember that depression is not your fault and is not something you can overcome with willpower alone. Treatment is important for depression, just like for any other illness. Feeling better takes time, and your mood will improve little by little. Stay active  · Stay busy and get outside. Take a walk, or try some other light exercise. · Talk with your doctor about an exercise program. Exercise can help with mild depression.   · Go to a movie or concert. Take part in a Voodoo activity or other social gathering. Go to a Kenshoo game. · Ask a friend to have dinner with you. Take care of yourself  · Eat a balanced diet with plenty of fresh fruits and vegetables, whole grains, and lean protein. If you have lost your appetite, eat small snacks rather than large meals. · Avoid using illegal drugs or marijuana and drinking alcohol. Do not take medicines that have not been prescribed for you. They may interfere with medicines you may be taking for depression, or they may make your depression worse. · Take your medicines exactly as they are prescribed. You may start to feel better within 1 to 3 weeks of taking antidepressant medicine. But it can take as many as 6 to 8 weeks to see more improvement. If you have questions or concerns about your medicines, or if you do not notice any improvement by 3 weeks, talk to your doctor. · Continue to take your medicine after your symptoms improve. Taking your medicine for at least 6 months after you feel better can help keep you from getting depressed again. If this isn't the first time you have been depressed, your doctor may recommend you to take medicine even longer. · If you have any side effects from your medicine, tell your doctor. Many side effects are mild and will go away on their own after you have been taking the medicine for a few weeks. Some may last longer. Talk to your doctor if side effects are bothering you too much. You might be able to try a different medicine. · Continue counseling. It may help prevent depression from returning, especially if you've had multiple episodes of depression. Talk with your counselor if you are having a hard time attending your sessions or you think the sessions aren't working. Don't just stop going. · Get enough sleep. Talk to your doctor if you are having problems sleeping. · Avoid sleeping pills unless they are prescribed by the doctor treating your depression.  Sleeping pills may make you groggy during the day, and they may interact with other medicine you are taking. · If you have any other illnesses, such as diabetes, heart disease, or high blood pressure, make sure to continue with your treatment. Tell your doctor about all of the medicines you take, including those with or without a prescription. · If you or someone you know talks about suicide, self-harm, or feeling hopeless, get help right away. Call the [address] [place] [address] at 1-800-273-talk (4-269.721.2232) or text HOME to 374772 to access the Crisis Text Line. Consider saving these numbers in your phone. When should you call for help? Call 191 anytime you think you may need emergency care. For example, call if:    · You feel like hurting yourself or someone else.     · Someone you know has depression and is about to attempt or is attempting suicide. Call your doctor now or seek immediate medical care if:    · You hear voices.     · Someone you know has depression and:  ? Starts to give away his or her possessions. ? Uses illegal drugs or drinks alcohol heavily. ? Talks or writes about death, including writing suicide notes or talking about guns, knives, or pills. ? Starts to spend a lot of time alone. ? Acts very aggressively or suddenly appears calm. Watch closely for changes in your health, and be sure to contact your doctor if:    · You do not get better as expected. Where can you learn more? Go to [email or web address]://[email or web address] org and sign in to your [employer] account. Enter Z448 in the [facility] box to learn more about \"Recovering From Depression: Care Instructions. \"     If you do not have an account, please click on the \"Sign Up Now\" link. Current as of: June 16, 2021               Content Version: 13.1  © 9530-9394 Healthwise, Incorporated. Care instructions adapted under license by [facility]"[employer] [facility] ([facility]).  If you have questions about a medical condition or this instruction, always ask your healthcare professional. [place] [address] any warranty or liability for your use of this information.

## 2022-01-07 NOTE — PROGRESS NOTES
Romi Culver (:  1980) is a 46 y.o. male,Established patient, here for evaluation of the following chief complaint(s): Hypertension (ROUTINE MED CHECK AND REFILLS, DISCUSS MEDS FOR QUITTING SMOKING ) and Anxiety (MED CHECK AND REFILLS)      Patient identification was verified at the start of the visit: Yes    Patient located for today's visit in [place]: Yes    ASSESSMENT/PLAN:  1. Social anxiety disorder  -     clonazePAM (KLONOPIN) 1 MG tablet; Take 1 tablet by mouth 2 times daily for 30 days. , Disp-60 tablet, R-0Normal   Pt working on getting established with new neurologist and then to see psychiatrist   Neurology filled Dec 2021 refill but Dr Chalo Sage has been filling it otherwise   Pt trying to get with psych to manage scripts and mental health   Low concern for misuse or abuse   Pt overdue however for UDS and med contract   Today's visit was virtual d/t COVID pandemic spike in cases in city and office  2. Psychophysiological insomnia  -     traZODone (DESYREL) 100 MG tablet; Take 1/2-1 tablets by mouth nightly, Disp-30 tablet, R-0Normal   Manages well   Discussed healthy sleep habits  3. Bipolar 1 disorder, mixed, full remission (HCC)  -     ziprasidone (GEODON) 40 MG capsule; Take 1 capsule by mouth daily Take with 60 mg at dinner, Disp-30 capsule, R-5Normal  -     ziprasidone (GEODON) 60 MG capsule; Take a 60 mg with a 40 mg at dinner, Disp-60 capsule, R-5Normal    Pt tolerates dosing well   Working to get in with psych  4. Major depression in complete remission (HCC)  -     ziprasidone (GEODON) 40 MG capsule; Take 1 capsule by mouth daily Take with 60 mg at dinner, Disp-30 capsule, R-5Normal  -     ziprasidone (GEODON) 60 MG capsule; Take a 60 mg with a 40 mg at dinner, Disp-60 capsule, R-5Normal   Depression screen negative, more issues with anxiety than depression currently per pt   No thoughts of suicide  5.  Gastroesophageal reflux disease, unspecified whether esophagitis present  -     omeprazole (PRILOSEC) 20 MG delayed release capsule; Take 1 capsule by mouth every morning (before breakfast), Disp-30 capsule, R-11Normal   Stable  6. Essential hypertension  -     amLODIPine (NORVASC) 2.5 MG tablet; TAKE 1 TABLET DAILY, Disp-30 tablet, R-5Normal   BP well controlled at home, last in office check was stable   Follow a low sodium diet   Physical activity 150 minutes weekly recommended    Weight loss, initial goal 10% of body weight recommended  7. Partial symptomatic epilepsy with complex partial seizures, not intractable, without status epilepticus (HCC)  -     clonazePAM (KLONOPIN) 1 MG tablet; Take 1 tablet by mouth 2 times daily for 30 days. , Disp-60 tablet, R-0Normal  -     gabapentin (NEURONTIN) 400 MG capsule; TAKE FOUR CAPSULES BY MOUTH NIGHTLY FOR SLEEP., Disp-120 capsule, R-2Normal   Stable, follows with neurology  8. Homocystinuria ([facility] [address]   Due for labs, virtual visit today  9. Encounter for tobacco use cessation counseling  -     varenicline (CHANTIX STARTING MONTH WEI) 0.5 MG X 11 & 1 MG X 42 tablet; Take by mouth., Disp-1 box, R-0Normal   Pt requested chantix, successfully quit in past using this   Discussed side effects, reports he was on geodon and similar meds in past when he last used this and tolerated it well   Stop smoking by day 5-7 of medication  10. Tobacco abuse  -     varenicline (CHANTIX STARTING MONTH WEI) 0.5 MG X 11 & 1 MG X 42 tablet; Take by mouth., Disp-1 box, R-0Normal   Working on quitting smoking    Return in about 3 months (around 4/7/2022) for Physical and fasting labs and anxiety and diabetes screen. SUBJECTIVE/OBJECTIVE:  HPI     Pt checked BP periodically at home with parents, says it is well controlled. Follow a low sodium diet    Bipolar- working to get established with psych. Overall well managed but working on making life changes and being productive and considering job change. uses klonopin BID as maintenance, not only PRN per pt.      Today's visit was a check in for med refills, due for numerous meds. Stable on current meds and doses. Review of Systems   Constitutional: Negative for activity change, appetite change, fatigue, fever and unexpected weight change. HENT: Negative for congestion, ear pain, sinus pressure and sinus pain. Eyes: Negative for discharge and visual disturbance. Respiratory: Negative for cough, chest tightness and shortness of breath. Cardiovascular: Negative for chest pain, palpitations and leg swelling. Gastrointestinal: Negative for abdominal distention, abdominal pain, constipation, diarrhea and nausea. Endocrine: Negative for cold intolerance, heat intolerance, polydipsia, polyphagia and polyuria. Genitourinary: Negative for decreased urine volume, difficulty urinating, dysuria, flank pain, frequency and urgency. Musculoskeletal: Negative for arthralgias, back pain, gait problem, joint swelling, myalgias and neck pain. Skin: Negative for color change, rash and wound. Allergic/Immunologic: Negative for food allergies and immunocompromised state. Neurological: Negative for dizziness, tremors, speech difficulty, weakness, light-headedness, numbness and headaches. Hematological: Negative for adenopathy. Does not bruise/bleed easily. Psychiatric/Behavioral: Negative for confusion, decreased concentration, self-injury, sleep disturbance and suicidal ideas. The patient is nervous/anxious.         Patient-Reported Vitals 4/1/2021   Patient-Reported Weight 192   Patient-Reported Height 5' 9\"        Physical Exam    [INSTRUCTIONS:  \"[x]\" Indicates a positive item  \"[]\" Indicates a negative item  -- DELETE ALL ITEMS NOT EXAMINED]    Constitutional: [x] Appears well-developed and well-nourished [x] No apparent distress      [] Abnormal -     Mental status: [x] Alert and awake  [x] Oriented to person/place/time [x] Able to follow commands    [] Abnormal -     Eyes:   EOM    [x]  Normal    [] Abnormal -   Sclera  [x] Normal    [] Abnormal -          Discharge [x]  None visible   [] Abnormal -     HENT: [x] Normocephalic, atraumatic  [] Abnormal -   [x] Mouth/Throat: Mucous membranes are moist    External Ears [x] Normal  [] Abnormal -    Neck: [x] No visualized mass [] Abnormal -     Pulmonary/Chest: [x] Respiratory effort normal   [x] No visualized signs of difficulty breathing or respiratory distress        [] Abnormal -      Musculoskeletal:   [x] Normal gait with no signs of ataxia         [x] Normal range of motion of neck        [] Abnormal -     Neurological:        [x] No Facial Asymmetry (Cranial nerve 7 motor function) (limited exam due to video visit)          [x] No gaze palsy        [] Abnormal -          Skin:        [x] No significant exanthematous lesions or discoloration noted on facial skin         [] Abnormal -            Psychiatric:       [x] Normal Affect [] Abnormal -        [x] No Hallucinations    Other pertinent observable physical exam findings:-          On this date 1/7/2022 I have spent 25 minutes reviewing previous notes, test results and face to face (virtual) with the patient discussing the diagnosis and importance of compliance with the treatment plan as well as documenting on the day of the visit. Care Gaps Addressed  Hep C screen recommended with next blood draw   Flu vaccine recommended   Diabetes screen- needs checked  Lipids overdue  Chicken pox as child  PNA vaccine recommended      I have reviewed patient's pertinent medical history, relevant laboratory and imaging studies, and past/future health maintenance. Discussed with the patient the importance of adhering to their current medication regimen as directed. Advised the patient that they should continue to work on eating a healthy balanced diet and staying active by exercising within their personal limits. Orders as listed above. Patient was advised to keep future appointments with their respective specialty care team(s).  Patient had the opportunity to ask questions, all of which were answered to the best of my ability and with patient satisfaction. Patient understands and is agreeable with the care plan following today's visit. Patient is to schedule an appointment for any new or worsening symptoms. Go to ER for significant shortness of breath, chest pain, or uncontrolled pain or fever. I discussed with patient the risk and benefits of any medications that were prescribed today. I verified that the patient understands their medications, labs, and/or procedures. The patient is doing well with current medication regimen and does not have any barriers to adherence. The patient's self-management abilities are good. Follow Up in 3 Months for Physical and fasting labs and anxiety and diabetes screen    Dasha French is a 46 y.o. male being evaluated by a Virtual Visit (video visit) encounter to address concerns as mentioned above. A caregiver was present when appropriate. Due to this being a TeleHealth encounter (During [facility] public health emergency), evaluation of the following organ systems was limited: Vitals/Constitutional/EENT/Resp/CV/GI//MS/Neuro/Skin/Heme-Lymph-Imm. Pursuant to the emergency declaration under the [facility], [address] [place] [address] authority and the [employer] and [employer] Act, this Virtual Visit was conducted with patient's (and/or legal guardian's) consent, to reduce the patient's risk of exposure to COVID-19 and provide necessary medical care. The patient (and/or legal guardian) has also been advised to contact this office for worsening conditions or problems, and seek emergency medical treatment and/or call 911 if deemed necessary. Services were provided through a video synchronous discussion virtually to substitute for in-person clinic visit. Patient was located at home and provider was located in office or at home.      An electronic signature was used to authenticate this note.     --Sonal Almanzar, APRN - CNP

## 2022-01-10 ENCOUNTER — HOSPITAL ENCOUNTER (EMERGENCY)
Age: 42
Discharge: HOME | End: 2022-01-10
Attending: EMERGENCY MEDICINE
Payer: COMMERCIAL

## 2022-01-10 VITALS
OXYGEN SATURATION: 97 % | DIASTOLIC BLOOD PRESSURE: 83 MMHG | RESPIRATION RATE: 19 BRPM | WEIGHT: 201 LBS | BODY MASS INDEX: 30.56 KG/M2 | HEART RATE: 78 BPM | SYSTOLIC BLOOD PRESSURE: 120 MMHG | TEMPERATURE: 98.7 F

## 2022-01-10 DIAGNOSIS — B34.9 VIRAL ILLNESS: Primary | ICD-10-CM

## 2022-01-10 DIAGNOSIS — Z20.822 SUSPECTED COVID-19 VIRUS INFECTION: ICD-10-CM

## 2022-01-10 LAB
SARS-COV-2, NAAT: NOT DETECTED
SARS-COV-2: NOT DETECTED

## 2022-01-10 PROCEDURE — 87635 SARS-COV-2 COVID-19 AMP PRB: CPT

## 2022-01-10 PROCEDURE — U0005 INFEC AGEN DETEC AMPLI PROBE: HCPCS

## 2022-01-10 PROCEDURE — U0003 INFECTIOUS AGENT DETECTION BY NUCLEIC ACID (DNA OR RNA); SEVERE ACUTE RESPIRATORY SYNDROME CORONAVIRUS 2 (SARS-COV-2) (CORONAVIRUS DISEASE [COVID-19]), AMPLIFIED PROBE TECHNIQUE, MAKING USE OF HIGH THROUGHPUT TECHNOLOGIES AS DESCRIBED BY CMS-2020-01-R: HCPCS

## 2022-01-10 PROCEDURE — 99284 EMERGENCY DEPT VISIT MOD MDM: CPT

## 2022-01-10 ASSESSMENT — PAIN DESCRIPTION - PAIN TYPE: TYPE: ACUTE PAIN

## 2022-01-10 ASSESSMENT — PAIN DESCRIPTION - LOCATION: LOCATION: HEAD

## 2022-01-10 ASSESSMENT — PAIN SCALES - GENERAL: PAINLEVEL_OUTOF10: 7

## 2022-01-10 NOTE — ED PROVIDER NOTES
Emergency Department Provider Note  Location: [facility]  ED  1/10/2022     Patient Identification  Al Hernández is a 46 y.o. male    Chief Complaint  Concern For COVID-19 (patients states two days ago started with \"metallic taste in mouth\". Patient now has weakness, diarrhea, cough and feeling \"cloudy\". Unsure of covid exposure. )          HPI  (History provided by patient)  Patient is a 44-year-old male who presents for COVID testing. Patient reports he needs a work note otherwise \"I will be fired\". Patient endorses metallic taste in the mouth and abnormal taste sensation. He reports feeling fatigued. He endorsed some diarrhea and general weakness at triage however per my questioning he reports that he is had a few episodes of loose stool but otherwise just feels \"I feel when you have the flu\". No exacerbating or alleviating factors. He denies any chest pain shortness of breath lightheadedness loss of consciousness. Unsure of any COVID exposures but makes contact with multiple people through work. He is vaccinated x2. I have reviewed the following nursing documentation:  Allergies:    Allergies   Allergen Reactions    Banana Hives and Itching    Iodides      Other reaction(s): GI Intolerance    Ancef [Cefazolin Sodium] Rash    Iodine Nausea And Vomiting     WITH IODINATED CONTRAST    Shellfish-Derived Products Nausea And Vomiting       Past medical history:  has a past medical history of Abscess of elbow (2012), Alcoholism /alcohol abuse (01/01/2005), Arthritis, Durham's esophagus, Bipolar 1 disorder, mixed, full remission ([address] (01/01/2006), Contusion of wrist, right (04/01/2016), Dislocation of part of left shoulder girdle (01/01/1985), Erosive gastropathy (03/23/2018), Essential hypertension (01/01/2006), GERD (gastroesophageal reflux disease), Head injury due to trauma, Major depression in complete remission ([address] (01/01/2006), Osteoarthritis of left AC (acromioclavicular) trihexyphenidyl (ARTANE) 2 MG tablet Take 1 tablet by mouth 2 times daily 8/4/21   Felecia Shaw DO   nadolol (CORGARD) 20 MG tablet 1/2 tab a daily by mouth For high blood pressure. 8/4/21   Felecia Shaw, DO   lamoTRIgine (LAMICTAL) 200 MG tablet TAKE 2 TABLETS 1 TIME DAILY AT DINNER 8/4/21   Felecia Ordonezs DO   clonazePAM (KLONOPIN) 2 MG tablet Take 1 tablet by mouth 2 times daily as needed (SEIZURES) for up to 91 days. TAKE TO ABORT SEIZURES 8/4/21 1/7/22  Felecia Shaw DO   folic acid (FOLVITE) 053 MCG tablet Take 1 tablet by mouth daily 8/4/21   Felecia Ordonezs DO   Diclofenac Sodium POWD Apply 2 g topically 4 times daily Formula #5D Diclo 3% Lido 2% Prilo 2% 7/1/21   Rafi Dang MD   diclofenac sodium (VOLTAREN) 1 % GEL Apply 4 g topically 4 times daily as needed for Pain    Historical Provider, MD   fexofenadine (ALLEGRA) 180 MG tablet Take 1 tablet by mouth daily 9/25/20   Felecia Shaw DO   acetaminophen (TYLENOL) 500 MG tablet Take 2 tablets by mouth 3 times daily as needed for Pain 2/15/20   Erik Zamora,    GLUCOSAMINE-CHONDROITIN ER PO Take by mouth 2 times daily    Historical Provider, MD   guaiFENesin (MUCINEX) 600 MG extended release tablet Take 600 mg by mouth daily     Historical Provider, MD   Melatonin 5 MG TABS tablet Take 1 tablet by mouth nightly    Historical Provider, MD   therapeutic multivitamin-minerals (THERAGRAN-M) tablet Take 1 tablet by mouth daily. Historical Provider, MD       Social history:  reports that he has been smoking cigarettes. He has been smoking about 1.00 pack per day. He has never used smokeless tobacco. He reports current alcohol use of about 6.0 standard drinks of alcohol per week. He reports previous drug use. Drug: Marijuana Mert Brim).     Family history:    Family History   Problem Relation Age of Onset    Diabetes Mother     Rheum Arthritis Mother     Depression Mother     Hypertension Mother     High Cholesterol Mother     Osteoarthritis Mother Mother had a knee replacement x 2    Obesity Mother     Other Mother         varicose veins    Diabetes Father     High Blood Pressure Father     High Cholesterol Father     Other Father         interstitial cystitis, DDD C spine    Heart Disease Father         TACHYCARDIA    Mental Illness Brother         schizophrenia - dissociated    Obesity Brother     Diabetes Brother     Liver Disease Brother         fatty liver    High Cholesterol Brother     Hypothyroidism Brother     Obesity Brother     Other Brother         ervin, appendicitis    Glaucoma Brother         occular htn    Other Brother         tonsilitis, gall bladder    Stroke Maternal Grandmother     Diabetes Maternal Grandfather     Coronary Art Dis Maternal Grandfather     Heart Surgery Maternal Grandfather 54        CABG    Other Maternal Grandfather         PAD with amputations    Diabetes Paternal Grandmother     Glaucoma Paternal Great Grandmother     Macular Degen Paternal Great Grandmother     Dementia Paternal Great Grandmother          ROS  Review of Systems   Constitutional: Positive for fatigue. Negative for chills and fever. HENT: Negative for congestion and rhinorrhea. Eyes: Negative for photophobia and visual disturbance. Respiratory: Negative for cough, shortness of breath and wheezing. Cardiovascular: Negative for chest pain and palpitations. Gastrointestinal: Negative for abdominal pain, diarrhea, nausea and vomiting. Genitourinary: Negative for dysuria and hematuria. Musculoskeletal: Positive for myalgias. Negative for back pain and neck pain. Skin: Negative for rash and wound. Neurological: Negative for syncope and weakness. Psychiatric/Behavioral: Negative for agitation and confusion. Exam  ED Triage Vitals   BP Temp Temp src Pulse Resp SpO2 Height Weight   -- -- -- -- -- -- -- --       Physical Exam  Vitals and nursing note reviewed.    Constitutional:       General: He is not in acute distress. Appearance: He is well-developed. HENT:      Head: Normocephalic and atraumatic. Nose: Nose normal. No congestion. Eyes:      General: No scleral icterus. Extraocular Movements: Extraocular movements intact. Pupils: Pupils are equal, round, and reactive to light. Cardiovascular:      Rate and Rhythm: Normal rate and regular rhythm. Heart sounds: No murmur heard. Pulmonary:      Effort: Pulmonary effort is normal.      Breath sounds: Normal breath sounds. Abdominal:      General: There is no distension. Palpations: Abdomen is soft. Tenderness: There is no abdominal tenderness. Musculoskeletal:         General: No deformity. Normal range of motion. Cervical back: Normal range of motion and neck supple. No rigidity or tenderness. Skin:     General: Skin is warm. Findings: No rash. Neurological:      Mental Status: He is alert and oriented to person, place, and time. Motor: No abnormal muscle tone. Coordination: Coordination normal.   Psychiatric:         Mood and Affect: Mood normal.         Behavior: Behavior normal.           ED Course    ED Medication Orders (From admission, onward)    None            Radiology  No results found. Labs  Results for orders placed or performed during the hospital encounter of 01/10/22   SARS-CoV-2 NAAT (Rapid)    Specimen: Nasopharyngeal Swab   Result Value Ref Range    SARS-CoV-2, NAAT Not Detected Not Detected   COVID-19   Result Value Ref Range    SARS-CoV-2 Not Detected Not detected         MDM  Patient seen and evaluated. Relevant records reviewed. 80-year-old male who presents with generalized myalgias and abnormal taste sensation concern for COVID-19. On exam he is very well-appearing no acute distress reassuring vital signs. His exam is nonfocal unremarkable and overall very reassuring. His breath sounds are clear.  I do not suspect any complicating factors and therefore did not see any indication for chest x-ray or lab work as this is unlikely to . COVID swab obtained. I discussed appropriate quarantine supportive care return precautions and follow-up with the patient. He is agreeable to plan expressed understanding plan. Clinical Impression:  1. Viral illness    2. Suspected COVID-19 virus infection          Disposition:  Discharge to home in good condition. Blood pressure 120/83, pulse 78, temperature 98.7 °F (37.1 °C), temperature source Oral, resp. rate 19, weight 201 lb (91.2 kg), SpO2 97 %. Patient was given scripts for the following medications. I counseled patient how to take these medications. Discharge Medication List as of 1/10/2022 11:41 AM          Disposition referral (if applicable):  Imer Kaye [address]  [phone]    Call           Total critical care time is 0 minutes, which excludes separately billable procedures and updating family. Time spent is specifically for management of the presenting complaint and symptoms initially, direct bedside care, reevaluation, review of records, and consultation. There was a high probability of clinically significant life-threatening deterioration in the patient's condition, which required my urgent intervention. This chart was generated in part by using Dragon Dictation system and may contain errors related to that system including errors in grammar, punctuation, and spelling, as well as words and phrases that may be inappropriate. If there are any questions or concerns please feel free to contact the dictating provider for clarification.      Claudeen Bishop, MD  [address] MD  01/11/22 2019

## 2022-01-10 NOTE — Clinical Note
Shae Martinez was seen and treated in our emergency department on 1/10/2022. He may return to work on 01/15/2022. We believe you have a viral illness and you have a pending COVID swab. You need to quarantine until swab is resulted. If the swab is positive you need to quarantine for a minimum of 5 days and be fever free and minimal symptoms at day 5 to return to public wearing a mask. Otherwise you need to quarantine for total of 10 days. Return to emergency department if you have difficulty breathing chest pain loss of consciousness or other emergent concerns. If you have any questions or concerns, please don't hesitate to call.       Gavino Quinones MD

## 2022-01-11 ASSESSMENT — ENCOUNTER SYMPTOMS
DIARRHEA: 0
RHINORRHEA: 0
ABDOMINAL PAIN: 0
VOMITING: 0
COUGH: 0
NAUSEA: 0
WHEEZING: 0
SHORTNESS OF BREATH: 0
PHOTOPHOBIA: 0
BACK PAIN: 0

## 2022-01-25 ENCOUNTER — HOSPITAL ENCOUNTER (OUTPATIENT)
Age: 42
Discharge: HOME | End: 2022-01-25
Payer: COMMERCIAL

## 2022-01-25 DIAGNOSIS — R73.01 IMPAIRED FASTING GLUCOSE: Primary | ICD-10-CM

## 2022-01-25 DIAGNOSIS — Z11.59 ENCOUNTER FOR HEPATITIS C SCREENING TEST FOR LOW RISK PATIENT: ICD-10-CM

## 2022-01-25 DIAGNOSIS — R79.83 HOMOCYSTEINEMIA: ICD-10-CM

## 2022-01-25 DIAGNOSIS — F32.5 MAJOR DEPRESSION IN COMPLETE REMISSION (HCC): ICD-10-CM

## 2022-01-25 DIAGNOSIS — I10 ESSENTIAL HYPERTENSION: ICD-10-CM

## 2022-01-25 DIAGNOSIS — E55.9 VITAMIN D DEFICIENCY: ICD-10-CM

## 2022-01-25 DIAGNOSIS — R53.83 FATIGUE, UNSPECIFIED TYPE: ICD-10-CM

## 2022-01-25 DIAGNOSIS — R73.01 IMPAIRED FASTING GLUCOSE: ICD-10-CM

## 2022-01-25 DIAGNOSIS — E78.00 PURE HYPERCHOLESTEROLEMIA: ICD-10-CM

## 2022-01-25 LAB
A/G RATIO: 1.7 (ref 1.1–2.2)
ALBUMIN SERPL-MCNC: 5 G/DL (ref 3.4–5)
ALP BLD-CCNC: 58 U/L (ref 40–129)
ALT SERPL-CCNC: 29 U/L (ref 10–40)
ANION GAP SERPL CALCULATED.3IONS-SCNC: 14 MMOL/L (ref 3–16)
AST SERPL-CCNC: 31 U/L (ref 15–37)
BILIRUB SERPL-MCNC: 0.6 MG/DL (ref 0–1)
BUN BLDV-MCNC: 15 MG/DL (ref 7–20)
CALCIUM SERPL-MCNC: 9.8 MG/DL (ref 8.3–10.6)
CHLORIDE BLD-SCNC: 99 MMOL/L (ref 99–110)
CHOLESTEROL, TOTAL: 213 MG/DL (ref 0–199)
CO2: 24 MMOL/L (ref 21–32)
CREAT SERPL-MCNC: 0.9 MG/DL (ref 0.9–1.3)
FOLATE: >20 NG/ML (ref 4.78–24.2)
GFR AFRICAN AMERICAN: >60
GFR NON-AFRICAN AMERICAN: >60
GLUCOSE BLD-MCNC: 98 MG/DL (ref 70–99)
HDLC SERPL-MCNC: 58 MG/DL (ref 40–60)
HEPATITIS C ANTIBODY INTERPRETATION: NORMAL
HOMOCYSTEINE: 9 UMOL/L (ref 0–10)
LDL CHOLESTEROL CALCULATED: 134 MG/DL
POTASSIUM SERPL-SCNC: 4.8 MMOL/L (ref 3.5–5.1)
SODIUM BLD-SCNC: 137 MMOL/L (ref 136–145)
TOTAL PROTEIN: 8 G/DL (ref 6.4–8.2)
TRIGL SERPL-MCNC: 105 MG/DL (ref 0–150)
VITAMIN B-12: 712 PG/ML (ref 211–911)
VITAMIN D 25-HYDROXY: 65.9 NG/ML
VLDLC SERPL CALC-MCNC: 21 MG/DL

## 2022-01-25 PROCEDURE — 86803 HEPATITIS C AB TEST: CPT

## 2022-01-25 PROCEDURE — 80061 LIPID PANEL: CPT

## 2022-01-25 PROCEDURE — 83036 HEMOGLOBIN GLYCOSYLATED A1C: CPT

## 2022-01-25 PROCEDURE — 83090 ASSAY OF HOMOCYSTEINE: CPT

## 2022-01-25 PROCEDURE — 36415 COLL VENOUS BLD VENIPUNCTURE: CPT

## 2022-01-25 PROCEDURE — 80053 COMPREHEN METABOLIC PANEL: CPT

## 2022-01-25 PROCEDURE — 82306 VITAMIN D 25 HYDROXY: CPT

## 2022-01-25 PROCEDURE — 82607 VITAMIN B-12: CPT

## 2022-01-25 PROCEDURE — 82746 ASSAY OF FOLIC ACID SERUM: CPT

## 2022-01-26 LAB
ESTIMATED AVERAGE GLUCOSE: 108.3 MG/DL
HBA1C MFR BLD: 5.4 %

## 2022-02-02 ENCOUNTER — TELEPHONE (OUTPATIENT)
Dept: FAMILY MEDICINE CLINIC | Age: 42
End: 2022-02-02

## 2022-02-02 ENCOUNTER — OFFICE VISIT (OUTPATIENT)
Dept: FAMILY MEDICINE CLINIC | Age: 42
End: 2022-02-02
Payer: COMMERCIAL

## 2022-02-02 VITALS
SYSTOLIC BLOOD PRESSURE: 110 MMHG | RESPIRATION RATE: 20 BRPM | DIASTOLIC BLOOD PRESSURE: 70 MMHG | BODY MASS INDEX: 30.62 KG/M2 | OXYGEN SATURATION: 98 % | WEIGHT: 202 LBS | HEART RATE: 70 BPM | HEIGHT: 68 IN

## 2022-02-02 DIAGNOSIS — Z79.899 LONG-TERM USE OF HIGH-RISK MEDICATION: ICD-10-CM

## 2022-02-02 DIAGNOSIS — T50.905A EXTRAPYRAMIDAL MOVEMENT DISORDER, DRUG-INDUCED: ICD-10-CM

## 2022-02-02 DIAGNOSIS — F40.10 SOCIAL ANXIETY DISORDER: ICD-10-CM

## 2022-02-02 DIAGNOSIS — F32.5 MAJOR DEPRESSION IN COMPLETE REMISSION (HCC): Chronic | ICD-10-CM

## 2022-02-02 DIAGNOSIS — F31.78 BIPOLAR 1 DISORDER, MIXED, FULL REMISSION (HCC): ICD-10-CM

## 2022-02-02 DIAGNOSIS — F32.5 MAJOR DEPRESSION IN COMPLETE REMISSION (HCC): ICD-10-CM

## 2022-02-02 DIAGNOSIS — F31.78 BIPOLAR 1 DISORDER, MIXED, FULL REMISSION (HCC): Primary | ICD-10-CM

## 2022-02-02 DIAGNOSIS — G25.89 EXTRAPYRAMIDAL MOVEMENT DISORDER, DRUG-INDUCED: ICD-10-CM

## 2022-02-02 DIAGNOSIS — G40.209 PARTIAL SYMPTOMATIC EPILEPSY WITH COMPLEX PARTIAL SEIZURES, NOT INTRACTABLE, WITHOUT STATUS EPILEPTICUS (HCC): Chronic | ICD-10-CM

## 2022-02-02 DIAGNOSIS — F51.04 PSYCHOPHYSIOLOGICAL INSOMNIA: ICD-10-CM

## 2022-02-02 LAB
ALCOHOL URINE: NORMAL
AMPHETAMINE SCREEN, URINE: NEGATIVE
BARBITURATE SCREEN, URINE: NEGATIVE
BENZODIAZEPINE SCREEN, URINE: NEGATIVE
BUPRENORPHINE URINE: NEGATIVE
COCAINE METABOLITE SCREEN URINE: NEGATIVE
FENTANYL SCREEN, URINE: NORMAL
GABAPENTIN SCREEN, URINE: NORMAL
MDMA URINE: NEGATIVE
METHADONE SCREEN, URINE: NEGATIVE
METHAMPHETAMINE, URINE: NEGATIVE
OPIATE SCREEN URINE: NEGATIVE
OXYCODONE SCREEN URINE: NEGATIVE
PHENCYCLIDINE SCREEN URINE: NEGATIVE
PROPOXYPHENE SCREEN, URINE: NORMAL
SYNTHETIC CANNABINOIDS(K2) SCREEN, URINE: NORMAL
THC SCREEN, URINE: NEGATIVE
TRAMADOL SCREEN URINE: NORMAL
TRICYCLIC ANTIDEPRESSANTS, UR: NEGATIVE

## 2022-02-02 PROCEDURE — 80305 DRUG TEST PRSMV DIR OPT OBS: CPT | Performed by: FAMILY MEDICINE

## 2022-02-02 PROCEDURE — G8484 FLU IMMUNIZE NO ADMIN: HCPCS | Performed by: FAMILY MEDICINE

## 2022-02-02 PROCEDURE — G8427 DOCREV CUR MEDS BY ELIG CLIN: HCPCS | Performed by: FAMILY MEDICINE

## 2022-02-02 PROCEDURE — G8417 CALC BMI ABV UP PARAM F/U: HCPCS | Performed by: FAMILY MEDICINE

## 2022-02-02 PROCEDURE — 4004F PT TOBACCO SCREEN RCVD TLK: CPT | Performed by: FAMILY MEDICINE

## 2022-02-02 PROCEDURE — 99215 OFFICE O/P EST HI 40 MIN: CPT | Performed by: FAMILY MEDICINE

## 2022-02-02 RX ORDER — TRIHEXYPHENIDYL HYDROCHLORIDE 2 MG/1
2 TABLET ORAL 2 TIMES DAILY
Qty: 60 TABLET | Refills: 5 | Status: SHIPPED | OUTPATIENT
Start: 2022-02-02 | End: 2022-05-03 | Stop reason: SDUPTHER

## 2022-02-02 RX ORDER — CLONAZEPAM 1 MG/1
1 TABLET ORAL 2 TIMES DAILY
Qty: 60 TABLET | Refills: 2 | Status: SHIPPED | OUTPATIENT
Start: 2022-02-02 | End: 2022-05-03 | Stop reason: SDUPTHER

## 2022-02-02 RX ORDER — TRAZODONE HYDROCHLORIDE 50 MG/1
TABLET ORAL
Qty: 30 TABLET | Refills: 5 | Status: SHIPPED | OUTPATIENT
Start: 2022-02-02 | End: 2022-05-03 | Stop reason: SDUPTHER

## 2022-02-02 RX ORDER — ZIPRASIDONE HYDROCHLORIDE 60 MG/1
60 CAPSULE ORAL 2 TIMES DAILY WITH MEALS
Qty: 60 CAPSULE | Refills: 2 | Status: SHIPPED | OUTPATIENT
Start: 2022-02-02 | End: 2022-05-03 | Stop reason: SDUPTHER

## 2022-02-02 RX ORDER — ZIPRASIDONE HYDROCHLORIDE 60 MG/1
CAPSULE ORAL
Qty: 60 CAPSULE | Refills: 5 | Status: SHIPPED | OUTPATIENT
Start: 2022-02-02 | End: 2022-02-02 | Stop reason: DRUGHIGH

## 2022-02-02 RX ORDER — CLONAZEPAM 2 MG/1
2 TABLET ORAL 2 TIMES DAILY PRN
Qty: 60 TABLET | Refills: 1 | Status: SHIPPED | OUTPATIENT
Start: 2022-02-02 | End: 2022-08-09

## 2022-02-02 RX ORDER — GABAPENTIN 400 MG/1
CAPSULE ORAL
Qty: 120 CAPSULE | Refills: 2 | Status: SHIPPED | OUTPATIENT
Start: 2022-02-02 | End: 2022-05-03 | Stop reason: SDUPTHER

## 2022-02-02 NOTE — PROGRESS NOTES
Dakota Hui (:  1980) is a 46 y.o. male,Established patient, here for evaluation of the following chief complaint(s):  Depression (follow up on depression) and Seizures (follow up on seizures)       ASSESSMENT/PLAN:   417    Patient Instructions   David Murray was seen today for depression and seizures. Diagnoses and all orders for this visit:    Bipolar 1 disorder, mixed, full remission ([facility]  -     ziprasidone (GEODON) 60 MG capsule; Take a 60 mg with a 40 mg at dinner  Find a psychiatrist.    Major depression in complete remission ([facility] [address]  -     ziprasidone (GEODON) 60 MG capsule; Take a 60 mg with a 40 mg at dinner   Find a psychiatrist.    Partial symptomatic epilepsy with complex partial seizures, not intractable, without status epilepticus ([facility]  -     clonazePAM (KLONOPIN) 1 MG tablet; Take 1 tablet by mouth 2 times daily for 90 days. -     clonazePAM (KLONOPIN) 2 MG tablet; Take 1 tablet by mouth 2 times daily as needed (SEIZURES) for up to 91 days. TAKE TO ABORT SEIZURES  -     gabapentin (NEURONTIN) 400 MG capsule; TAKE FOUR CAPSULES BY MOUTH NIGHTLY FOR SLEEP. Neurologist.    Social anxiety disorder  -     clonazePAM (KLONOPIN) 1 MG tablet; Take 1 tablet by mouth 2 times daily for 90 days.  -     Drug Panel-PM-HI Res-UR Interp-A; Future  Find a psychiatrist.    Psychophysiological insomnia  -     traZODone (DESYREL) 50 MG tablet; Take 1/2-1 tablets by mouth nightly  Stable. Extrapyramidal movement disorder, drug-induced  -     trihexyphenidyl (ARTANE) 2 MG tablet; Take 1 tablet by mouth 2 times daily  No symptoms. Long-term use of high-risk medication  -     POCT Rapid Drug Screen  -     Drug Panel-PM-HI Res-UR Interp-A; Future    You can lower your LDL cholesterol by decreasing your saturated fats, eating fewer egg yolks and using egg substitutes, eating smaller portions of red meat and using more skinless poultry and fish to meet your protein needs.   Greek yogurt and low-fat cottage cheese are good sources of protein which are low in cholesterol. Try to get some of your proteins from plant sources such as beans, nuts, peas and / or soy products such as tofu. Adding fruits and vegetables to your diet (a total of 5 servings or more between the two) can help also. They help to fill you up so you eat less of the cholesterol raising foods and the fiber lowers cholesterol. Return in about 3 months (around 5/4/2022) for Hypertension, Anxiety, Tobacco.     Subjective   SUBJECTIVE/OBJECTIVE:  Chief Complaint   Patient presents with    Depression     follow up on depression    Seizures     follow up on seizures   334  HPI    Long-term use of high-risk medication  -     POCT Rapid Drug Screen  He never shows the med on the 1st test. Always has to do the 2nd test.  Bipolar 1 disorder, mixed, full remission ([address]  Feels like he is level. Job is making him feel better. He is happier than has been for some time. No SE's. Does have a dry mouth. Major depression in complete remission ([address]  Sleeps like a rock. Not exercising because wants to take his dog but dog is too old. Partial symptomatic epilepsy with complex partial seizures, not intractable, without status epilepticus ([address]  Had a few instances where he got rattled and had seizure-like symptoms. Social anxiety disorder  Has anxiety but not social.    Psychophysiological insomnia  Sleeps like a rock. Extrapyramidal movement disorder, drug-induced  No symptoms on the current medication regimen.     Review of Systems   Past Medical History:   Diagnosis Date    Abscess of elbow 2012    right    Alcoholism /alcohol abuse 01/01/2005    Stopped 2014    Arthritis     Durham's esophagus     Bipolar 1 disorder, mixed, full remission ([address] 01/01/2006    Contusion of wrist, right 04/01/2016    Dislocation of part of left shoulder girdle 01/01/1985    arm pulled out of socket - no medical visit at the time    Erosive gastropathy 03/23/2018 antrum    Essential hypertension 01/01/2006    lost 100 lbs in 1 year    GERD (gastroesophageal reflux disease)     Head injury due to trauma     Frontal lobe trauma causing seizures and shakes. No workup till after seizures/shakes appeared    Major depression in complete remission ([facility] 01/01/2006    while on medicine    Osteoarthritis of left AC (acromioclavicular) joint     Osteomyelitis ([facility] 01/01/2003    right tibia    Partial complex seizure disorder without intractable epilepsy ([facility] [facility] 01/01/2006    fist clenching is a typical pre-seizure activity for him    Psychiatric diagnosis     Saw Dr. Nicolas Boston  [phone]    Social anxiety disorder 01/01/2006    moderate       Past Surgical History:   Procedure Laterality Date    APPENDECTOMY  05/22/2012    lap appy    ELBOW DEBRIDEMENT Right     MRSA    KNEE SURGERY Right     debridement osteomyelitis    MOUTH SURGERY Left 2019    Lower molar removal    UPPER GASTROINTESTINAL ENDOSCOPY  12/16/2010    UPPER GASTROINTESTINAL ENDOSCOPY  03/23/2018    gastric and esophageal bx, erosive gastropathy    WISDOM TOOTH EXTRACTION Bilateral 1998    #4 removed       Social History     Socioeconomic History    Marital status: Single     Spouse name: Not on file    Number of children: 0    Years of education: 15    Highest education level: Not on file   Occupational History    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 Tobacco Use    Smoking status: Current Every Day Smoker     Packs/day: 1.00     Types: Cigarettes     Last attempt to quit: 3/6/2018     Years since quitting: 3.9    Smokeless tobacco: Never Used    Tobacco comment: social.  Have almost quit (nicorette) 3/13/17. Restarted 12/21/18 < 1 PPD. .5/6/19. 1-1.5 PPD. Plans on quit. 9/25/20. He is at 1 pack/day. 4/1/21. 1/2 PPD. Vaping Use    Vaping Use: Never used   Substance and Sexual Activity    Alcohol use: Yes     Alcohol/week: 6.0 standard drinks     Types: 6 Cans of beer per week     Comment: Occasional 3-4 beers. Has not drank at all for 4 weeks. doesn't want to lose GF. 6 pack/nite. Tried AA but it was at [facility].  Drug use: Not Currently     Types: Marijuana Chikiszahra Yousif)    Sexual activity: Not Currently   Other Topics Concern    Not on file   Social History Narrative    Only exercise is work. 6/8/17. 9/18/17. 4/12/18. 7/23/18. 10/22/18. 1/21/19. 5/6/19. 8/9/19.11/27/19. Less time shoeing horse. Only exercises work. 9/25/20. No exercise. 1/8/21. 4/1/21. Plans walking trails. 8/4/21. Not currently hiking. 2/2/22. Social Determinants of Health     Financial Resource Strain: Low Risk     Difficulty of Paying Living Expenses: Not hard at all   Food Insecurity: No Food Insecurity    Worried About Running Out of Food in the Last Year: Never true    Es of Food in the Last Year: Never true   Transportation Needs: No Transportation Needs    Lack of Transportation (Medical): No    Lack of Transportation (Non-Medical):  No   Physical Activity:     Days of Exercise per Week: Not on file    Minutes of Exercise per Session: Not on file   Stress:     Feeling of Stress : Not on file   Social Connections:     Frequency of Communication with Friends and Family: Not on file    Frequency of Social Gatherings with Friends and Family: Not on file    Attends Druze Services: Not on file    Active Member of Clubs or Organizations: Not on file    Attends Club or Organization Meetings: Not on file    Marital Status: Not on file   Intimate Partner Violence:     Fear of Current or Ex-Partner: Not on file    Emotionally Abused: Not on file    Physically Abused: Not on file    Sexually Abused: Not on file   Housing Stability:     Unable to Pay for Housing in the Last Year: Not on file    Number of [place] in the Last Year: Not on file    Unstable Housing in the Last Year: Not on file       Family History   Problem Relation Age of Onset    Diabetes Mother    [facility] Rheum Arthritis Mother     Depression Mother     Hypertension Mother     High Cholesterol Mother     Osteoarthritis Mother         Mother had a knee replacement x 2    Obesity Mother     Other Mother         varicose veins    Diabetes Father     High Blood Pressure Father     High Cholesterol Father     Other Father         interstitial cystitis, DDD C spine    Heart Disease Father         TACHYCARDIA    Mental Illness Brother         schizophrenia - dissociated    Obesity Brother     Diabetes Brother     Liver Disease Brother         fatty liver    High Cholesterol Brother     Hypothyroidism Brother     Obesity Brother     Other Brother         ervin, appendicitis    Glaucoma Brother         occular htn    Other Brother         tonsilitis, gall bladder    Stroke Maternal Grandmother     Diabetes Maternal Grandfather     Coronary Art Dis Maternal Grandfather     Heart Surgery Maternal Grandfather 54        CABG    Other Maternal Grandfather         PAD with amputations    Diabetes Paternal Grandmother     Heart Surgery Paternal Grandmother         aortic valve replacement transvascular    Glaucoma Paternal Great Grandmother     Macular Degen Paternal Great Grandmother     Dementia Paternal Great Grandmother        Allergies   Allergen Reactions    Banana Hives and Itching    Iodides      Other reaction(s): GI Intolerance    Ancef [Cefazolin Sodium] Rash    Iodine Nausea And Vomiting     WITH IODINATED CONTRAST    Shellfish-Derived Products Nausea And Vomiting       Current Outpatient Medications   Medication Sig Dispense Refill    ziprasidone (GEODON) 40 MG capsule Take 1 capsule by mouth daily Take with 60 mg at dinner 30 capsule 5    ziprasidone (GEODON) 60 MG capsule Take a 60 mg with a 40 mg at dinner 60 capsule 5    omeprazole (PRILOSEC) 20 MG delayed release capsule Take 1 capsule by mouth every morning (before breakfast) 30 capsule 11    amLODIPine (NORVASC) 2.5 MG tablet TAKE 1 TABLET DAILY 30 tablet 5    clonazePAM (KLONOPIN) 1 MG tablet Take 1 tablet by mouth 2 times daily for 30 days. 60 tablet 0    gabapentin (NEURONTIN) 400 MG capsule TAKE FOUR CAPSULES BY MOUTH NIGHTLY FOR SLEEP. 120 capsule 2    traZODone (DESYREL) 100 MG tablet Take 1/2-1 tablets by mouth nightly 30 tablet 0    Cholecalciferol (VITAMIN D3) 125 MCG (5000 UT) CAPS Indications: Vitamin D Deficiency Take 1 capsule daily by mouth 5 days/week i.e. skip Monday and Thursday 22 capsule 11    atorvastatin (LIPITOR) 10 MG tablet Take 1 tablet by mouth daily 30 tablet 11    trihexyphenidyl (ARTANE) 2 MG tablet Take 1 tablet by mouth 2 times daily 60 tablet 5    nadolol (CORGARD) 20 MG tablet 1/2 tab a daily by mouth For high blood pressure. 45 tablet 1    lamoTRIgine (LAMICTAL) 200 MG tablet TAKE 2 TABLETS 1 TIME DAILY AT DINNER 60 tablet 5    clonazePAM (KLONOPIN) 2 MG tablet Take 1 tablet by mouth 2 times daily as needed (SEIZURES) for up to 91 days. TAKE TO ABORT SEIZURES 60 tablet 1    folic acid (FOLVITE) 816 MCG tablet Take 1 tablet by mouth daily 30 tablet 11    Diclofenac Sodium POWD Apply 2 g topically 4 times daily Formula #5D Diclo 3% Lido 2% Prilo 2% 240 g 11    diclofenac sodium (VOLTAREN) 1 % GEL Apply 4 g topically 4 times daily as needed for Pain      fexofenadine (ALLEGRA) 180 MG tablet Take 1 tablet by mouth daily 30 tablet 11    acetaminophen (TYLENOL) 500 MG tablet Take 2 tablets by mouth 3 times daily as needed for Pain 180 tablet 0    GLUCOSAMINE-CHONDROITIN ER PO Take by mouth 2 times daily      guaiFENesin (MUCINEX) 600 MG extended release tablet Take 600 mg by mouth daily       Melatonin 5 MG TABS tablet Take 1 tablet by mouth nightly      therapeutic multivitamin-minerals (THERAGRAN-M) tablet Take 1 tablet by mouth daily.       varenicline (CHANTIX STARTING MONTH WEI) 0.5 MG X 11 & 1 MG X 42 tablet Take by mouth. (Patient not taking: Reported on 2/2/2022) 1 box 0     No current facility-administered medications for this visit. Objective   Physical Exam  Vitals and nursing note reviewed. Constitutional:       General: He is not in acute distress. Appearance: He is well-developed. He is obese. He is not ill-appearing or diaphoretic. Eyes:      General: No scleral icterus. Right eye: No discharge. Left eye: No discharge. Conjunctiva/sclera: Conjunctivae normal.   Neck:      Thyroid: No thyroid mass or thyromegaly. Vascular: No carotid bruit or JVD. Trachea: Trachea and phonation normal. No tracheal tenderness or tracheal deviation. Cardiovascular:      Rate and Rhythm: Normal rate and regular rhythm. No extrasystoles are present. Heart sounds: Normal heart sounds, S1 normal and S2 normal. Heart sounds not distant. No murmur heard. No systolic murmur is present. No diastolic murmur is present. No friction rub. No gallop. No S3 or S4 sounds. Pulmonary:      Effort: Pulmonary effort is normal. No respiratory distress. Breath sounds: Normal breath sounds and air entry. No stridor. No decreased breath sounds, wheezing, rhonchi or rales. Musculoskeletal:      Cervical back: Neck supple. Right lower leg: No edema. Left lower leg: No edema. Lymphadenopathy:      Head:      Right side of head: No submandibular or tonsillar adenopathy. Left side of head: No submandibular or tonsillar adenopathy. Cervical: No cervical adenopathy. Right cervical: No superficial, deep or posterior cervical adenopathy. Left cervical: No superficial, deep or posterior cervical adenopathy. Skin:     General: Skin is warm and dry. Coloration: Skin is not pale. Neurological:      Mental Status: He is alert.       Deep Tendon Reflexes:      Reflex Scores:       Patellar reflexes are 2+ on the right side and 2+ on the left side. Psychiatric:         Attention and Perception: Attention and perception normal.         Mood and Affect: Mood and affect normal. Mood is not anxious or depressed. Speech: Speech normal. Speech is not rapid and pressured. Behavior: Behavior normal. Behavior is cooperative. Cognition and Memory: Cognition and memory normal.         Judgment: Judgment normal.        Vitals:    02/02/22 1516   BP: 110/70   Site: Right Upper Arm   Position: Sitting   Cuff Size: Medium Adult   Pulse: 70   Resp: 20   SpO2: 98%   Weight: 202 lb (91.6 kg)   Height: 5' 8\" (1.727 m)     BP Readings from Last 3 Encounters:   02/02/22 110/70   01/10/22 120/83   08/04/21 120/80     Pulse Readings from Last 3 Encounters:   02/02/22 70   01/10/22 78   08/04/21 77     Wt Readings from Last 3 Encounters:   02/02/22 202 lb (91.6 kg)   01/10/22 201 lb (91.2 kg)   08/18/21 206 lb (93.4 kg)     Body mass index is 30.71 kg/m². 1/10/2022 10:41 1/10/2022 11:47 1/25/2022 10:59   Sodium   137   Potassium   4.8   Chloride   99   CO2   24   BUN   15   Creatinine   0.9   Anion Gap   14   GFR Non-African American   >60   Glucose   98   CALCIUM, SERUM, [id number]   9.8   Total Protein   8.0   Cholesterol, Total   213 (H)   HDL Cholesterol   58   LDL Calculated   134 (H)   Triglycerides   105   VLDL Cholesterol Calculated   21   Homocysteine   9   Albumin   5.0   Albumin/Globulin Ratio   1.7   Alk Phos   58   ALT   29   AST   31   Bilirubin   0.6   Hemoglobin A1C   5.4   eAG (mg/dL)   108. 3   Vit D, 25-Hydroxy   65.9   Folate   >20.00   Vitamin B-12   712   SARS-CoV-2  Not Detected    SARS-CoV-2, NAAT Not Detected     Hep C Ab Interp   Non-reactive      On this date 2/2/2022 I have spent 43 minutes reviewing previous notes, test results and face to face with the patient discussing the diagnosis and importance of compliance with the treatment plan as well as documenting on the day of the visit. An electronic signature was used to authenticate this note.     --Rachael Gaming, DO

## 2022-02-02 NOTE — PATIENT INSTRUCTIONS
David Murray was seen today for depression and seizures. Diagnoses and all orders for this visit:    Bipolar 1 disorder, mixed, full remission ([facility]  -     ziprasidone (GEODON) 60 MG capsule; Take a 60 mg with a 40 mg at dinner  Find a psychiatrist.    Major depression in complete [facility]  -     ziprasidone (GEODON) 60 MG capsule; Take a 60 mg with a 40 mg at dinner   Find a psychiatrist.    Partial symptomatic epilepsy with complex partial seizures, not intractable, without status epilepticus ([facility]  -     clonazePAM (KLONOPIN) 1 MG tablet; Take 1 tablet by mouth 2 times daily for 90 days. -     clonazePAM (KLONOPIN) 2 MG tablet; Take 1 tablet by mouth 2 times daily as needed (SEIZURES) for up to 91 days. TAKE TO ABORT SEIZURES  -     gabapentin (NEURONTIN) 400 MG capsule; TAKE FOUR CAPSULES BY MOUTH NIGHTLY FOR SLEEP. Neurologist.    Social anxiety disorder  -     clonazePAM (KLONOPIN) 1 MG tablet; Take 1 tablet by mouth 2 times daily for 90 days.  -     Drug Panel-PM-HI Res-UR Interp-A; Future  Find a psychiatrist.    Psychophysiological insomnia  -     traZODone (DESYREL) 50 MG tablet; Take 1/2-1 tablets by mouth nightly  Stable. Extrapyramidal movement disorder, drug-induced  -     trihexyphenidyl (ARTANE) 2 MG tablet; Take 1 tablet by mouth 2 times daily  No symptoms. Long-term use of high-risk medication  -     POCT Rapid Drug Screen  -     Drug Panel-PM-HI Res-UR Interp-A; Future    You can lower your LDL cholesterol by decreasing your saturated fats, eating fewer egg yolks and using egg substitutes, eating smaller portions of red meat and using more skinless poultry and fish to meet your protein needs. Greek yogurt and low-fat cottage cheese are good sources of protein which are low in cholesterol. Try to get some of your proteins from plant sources such as beans, nuts, peas and / or soy products such as tofu.   Adding fruits and vegetables to your diet (a total of 5 servings or more between the two) can help also. They help to fill you up so you eat less of the cholesterol raising foods and the fiber lowers cholesterol.

## 2022-02-10 LAB
6-ACETYLMORPHINE: NOT DETECTED
7-AMINOCLONAZEPAM: PRESENT
ALPHA-OH-ALPRAZOLAM: NOT DETECTED
ALPHA-OH-MIDAZOLAM, URINE: NOT DETECTED
ALPRAZOLAM: NOT DETECTED
AMPHETAMINE: NOT DETECTED
BARBITURATES: NOT DETECTED
BENZOYLECGONINE: NOT DETECTED
BUPRENORPHINE: NOT DETECTED
CARISOPRODOL: NOT DETECTED
CLONAZEPAM: NOT DETECTED
CODEINE: NOT DETECTED
CREATININE URINE: 20.9 MG/DL (ref 20–400)
DIAZEPAM: NOT DETECTED
DRUGS EXPECTED: NORMAL
EER PAIN MGT DRUG PANEL, HIGH RES/EMIT U: NORMAL
ETHYL GLUCURONIDE: PRESENT
FENTANYL: NOT DETECTED
GABAPENTIN: PRESENT
HYDROCODONE: NOT DETECTED
HYDROMORPHONE: NOT DETECTED
LORAZEPAM: NOT DETECTED
MARIJUANA METABOLITE: NOT DETECTED
MDA: NOT DETECTED
MDEA: NOT DETECTED
MDMA URINE: NOT DETECTED
MEPERIDINE: NOT DETECTED
METHADONE: NOT DETECTED
METHAMPHETAMINE: NOT DETECTED
METHYLPHENIDATE: NOT DETECTED
MIDAZOLAM: NOT DETECTED
MORPHINE: NOT DETECTED
NALOXONE: NOT DETECTED
NORBUPRENORPHINE, FREE: NOT DETECTED
NORDIAZEPAM: NOT DETECTED
NORFENTANYL: NOT DETECTED
NORHYDROCODONE, URINE: NOT DETECTED
NOROXYCODONE: NOT DETECTED
NOROXYMORPHONE, URINE: NOT DETECTED
OXAZEPAM: NOT DETECTED
OXYCODONE: NOT DETECTED
OXYMORPHONE: NOT DETECTED
PAIN MANAGEMENT DRUG PANEL: NORMAL
PAIN MANAGEMENT DRUG PANEL: NORMAL
PCP: NOT DETECTED
PHENTERMINE: NOT DETECTED
PREGABALIN: NOT DETECTED
TAPENTADOL, URINE: NOT DETECTED
TAPENTADOL-O-SULFATE, URINE: NOT DETECTED
TEMAZEPAM: NOT DETECTED
TRAMADOL: NOT DETECTED
ZOLPIDEM: NOT DETECTED

## 2022-03-02 DIAGNOSIS — I10 ESSENTIAL HYPERTENSION: ICD-10-CM

## 2022-03-02 RX ORDER — NADOLOL 20 MG/1
TABLET ORAL
Qty: 45 TABLET | Refills: 0 | Status: SHIPPED | OUTPATIENT
Start: 2022-03-02 | End: 2022-05-03 | Stop reason: SDUPTHER

## 2022-03-02 NOTE — TELEPHONE ENCOUNTER
LV 2/2/22 WITH DR Ijeoma Smith NV NONE    Component Ref Range & Units 1/25/22 1059 2/15/20 1257 12/3/19 0241 3/25/19 1152 1/12/18 1631 9/13/16 1040 6/13/15 1000   Sodium 136 - 145 mmol/L 137  138  140  141  142  139  143    Potassium 3.5 - 5.1 mmol/L 4.8  4.7  4.5  4.9  4.2  5.3 High   4.5    Chloride 99 - 110 mmol/L 99  99  102  100  101  103  104    CO2 21 - 32 mmol/L 24  24  23  29  29  27  29    Anion Gap 3 - 16 14  15  15  12  12  9  10    Glucose 70 - 99 mg/dL 98  108 High   112 High   107 High   89  101 High   84    BUN 7 - 20 mg/dL 15  19  19  13  13  22 High   18    CREATININE 0.9 - 1.3 mg/dL 0.9  0.7 Low   0.9  1.0  0.8 Low   0.8 Low   0.7 Low     GFR Non- >60 >60  >60 CM  >60 CM  >60 CM  >60 CM  >60 CM  >60 CM    Comment: >60 mL/min/1.73m2 EGFR, calc. for ages 25 and older using the   MDRD formula (not corrected for weight), is valid for stable   renal function. GFR  >60 >60  >60 CM  >60 CM  >60 CM  >60 CM  >60 CM  >60 CM    Comment: Chronic Kidney Disease: less than 60 ml/min/1.73 sq. m.         Kidney Failure: less than 15 ml/min/1.73 sq.m. Results valid for patients 18 years and older.     Calcium 8.3 - 10.6 mg/dL 9.8  9.5  10.5  9.7  9.5  9.1  9.4    Total Protein 6.4 - 8.2 g/dL 8.0  7.8  9.7 High     6.7  6.9    Albumin 3.4 - 5.0 g/dL 5.0  4.7  4.8    4.2  4.3    Albumin/Globulin Ratio 1.1 - 2.2 1.7  1.5  1.0 Low     1.7  1.7    Total Bilirubin 0.0 - 1.0 mg/dL 0.6  <0.2  0.4    <0.2  <0.2    Alkaline Phosphatase 40 - 129 U/L 58  77  76    51  47    ALT 10 - 40 U/L 29  25  22    17  19    AST 15 - 37 U/L 31  25  25 CM    19  17    Globulin   3.1 R  4.9 R    2.5 R  2.6 R

## 2022-03-31 ENCOUNTER — TELEPHONE (OUTPATIENT)
Dept: FAMILY MEDICINE CLINIC | Age: 42
End: 2022-03-31

## 2022-03-31 DIAGNOSIS — S22.49XS CLOSED FRACTURE OF MULTIPLE RIBS, UNSPECIFIED LATERALITY, SEQUELA: Primary | ICD-10-CM

## 2022-03-31 RX ORDER — NAPROXEN 500 MG/1
500 TABLET ORAL 2 TIMES DAILY WITH MEALS
Qty: 60 TABLET | Refills: 0 | Status: SHIPPED | OUTPATIENT
Start: 2022-03-31 | End: 2022-08-09

## 2022-03-31 NOTE — TELEPHONE ENCOUNTER
Has 3 broken ribs. This happened on Monday or Tuesday. Cannot sleep because of the pain. The urgent care did not give him any pain medication. He tried to go to work but he can hardly move. He used some ice packs. He tried Voltaren gel. He is taking Tylenol. It is not enough. They have a disc with his x-rays on them that they can bring to the practice. They called [facility]. Mario Hammans said that they do not see rib fractures. A/P   Closed fracture of multiple ribs, unspecified laterality, sequela  -     naproxen (NAPROSYN) 500 MG tablet; Take 1 tablet by mouth 2 times daily (with meals)  Continue with Voltaren gel over the site of pain. Okay to take Tylenol 1000 mg up to 3 times a day. Schedule a visit to be seen in the office ASAP. We have a visit available for tomorrow at 430. Explained that normally rib fractures are seen by chest surgeon if surgery is needed. Surgery is usually done if the rib is at risk for puncturing the lung. Otherwise they are treated with pain medicine. We can use Miacalcin nasal spray for bone healing and to decrease pain.

## 2022-03-31 NOTE — TELEPHONE ENCOUNTER
----- Message from Oxana Cruz MA sent at 3/31/2022 12:04 PM EDT -----  Subject: Message to Provider    QUESTIONS  Information for Provider? Donte Fowler states pt was seen at urgent care   today, he was kicked by horse and has 3 broken ribs, was referred to   ortho, mom has questions like who would be best to see for rib injury,   closest, what does pcp think of [facility] where he was referred to? Request a call back as soon as possible.   ---------------------------------------------------------------------------  --------------  CALL BACK INFO  What is the best way for the office to contact you? OK to leave message on   voicemail  Preferred Call Back Phone Number? [phone]  ---------------------------------------------------------------------------  --------------  SCRIPT ANSWERS  Relationship to Patient? Parent  Representative Name? Margoth  Patient is under 25 and the Parent has custody? No  Is the Representative on the appropriate HIPAA document in Epic?  Yes

## 2022-04-01 ENCOUNTER — OFFICE VISIT (OUTPATIENT)
Dept: FAMILY MEDICINE CLINIC | Age: 42
End: 2022-04-01
Payer: COMMERCIAL

## 2022-04-01 VITALS
DIASTOLIC BLOOD PRESSURE: 84 MMHG | WEIGHT: 202 LBS | SYSTOLIC BLOOD PRESSURE: 130 MMHG | BODY MASS INDEX: 30.62 KG/M2 | HEART RATE: 81 BPM | HEIGHT: 68 IN | RESPIRATION RATE: 20 BRPM | OXYGEN SATURATION: 97 %

## 2022-04-01 DIAGNOSIS — K21.9 GASTROESOPHAGEAL REFLUX DISEASE, UNSPECIFIED WHETHER ESOPHAGITIS PRESENT: ICD-10-CM

## 2022-04-01 DIAGNOSIS — S22.49XS CLOSED FRACTURE OF MULTIPLE RIBS, UNSPECIFIED LATERALITY, SEQUELA: ICD-10-CM

## 2022-04-01 DIAGNOSIS — S22.49XS CLOSED FRACTURE OF MULTIPLE RIBS, UNSPECIFIED LATERALITY, SEQUELA: Primary | ICD-10-CM

## 2022-04-01 DIAGNOSIS — G47.01 INSOMNIA DUE TO MEDICAL CONDITION: ICD-10-CM

## 2022-04-01 PROCEDURE — G8427 DOCREV CUR MEDS BY ELIG CLIN: HCPCS | Performed by: FAMILY MEDICINE

## 2022-04-01 PROCEDURE — 4004F PT TOBACCO SCREEN RCVD TLK: CPT | Performed by: FAMILY MEDICINE

## 2022-04-01 PROCEDURE — G8417 CALC BMI ABV UP PARAM F/U: HCPCS | Performed by: FAMILY MEDICINE

## 2022-04-01 PROCEDURE — 99215 OFFICE O/P EST HI 40 MIN: CPT | Performed by: FAMILY MEDICINE

## 2022-04-01 RX ORDER — CALCITONIN SALMON 200 [IU]/.09ML
1 SPRAY, METERED NASAL DAILY
Qty: 3.7 ML | Refills: 0 | Status: SHIPPED | OUTPATIENT
Start: 2022-04-01 | End: 2022-05-03 | Stop reason: ALTCHOICE

## 2022-04-01 RX ORDER — TRAMADOL HYDROCHLORIDE 50 MG/1
50-100 TABLET ORAL EVERY 8 HOURS PRN
Qty: 120 TABLET | Refills: 0 | Status: SHIPPED | OUTPATIENT
Start: 2022-04-01 | End: 2022-05-01

## 2022-04-01 RX ORDER — TRAMADOL HYDROCHLORIDE 50 MG/1
50-100 TABLET ORAL EVERY 8 HOURS PRN
Qty: 120 TABLET | Refills: 0 | Status: SHIPPED | OUTPATIENT
Start: 2022-04-01 | End: 2022-04-01 | Stop reason: SDUPTHER

## 2022-04-01 RX ORDER — OMEPRAZOLE 40 MG/1
40 CAPSULE, DELAYED RELEASE ORAL DAILY
Qty: 30 CAPSULE | Refills: 5 | Status: SHIPPED | OUTPATIENT
Start: 2022-04-01 | End: 2022-08-09

## 2022-04-01 NOTE — PATIENT INSTRUCTIONS
Viridiana Roland was seen today for other. Diagnoses and all orders for this visit:    Closed fracture of multiple ribs, unspecified laterality, sequela  -     traMADol (ULTRAM) 50 MG tablet; Take 1-2 tablets by mouth every 8 hours as needed for Pain for up to 20 days. Intended supply: 5 days. Take lowest dose possible to manage pain  -     calcitonin (MIACALCIN) 200 UNIT/ACT nasal spray; 1 spray by Nasal route daily krupa  Drink 3  8 oz Glasses of milk or take Calcium 500 mg twice daily. Continue Naproxen 50 mg Twice daily with meals. Can add Tylenol 1000 mg up to 3 x/day in between as needed. Gastroesophageal reflux disease, unspecified whether esophagitis present  -     omeprazole (PRILOSEC) 40 MG delayed release capsule; Take 1 capsule by mouth Daily    Insomnia due to medical condition  Trazodone 50 mg 2 at bedtime. Constipation  Miralax 17 mg or less daily. Constipation, ACUTE  IF NO BM FOR 36 HOURS TAKE MILK OF MAGNESIA 2 TABLESPOONS OR A LAXATIVE LIKE DULCOLAX OR SENOKOT OVER THE COUNTER. THEN 12 HOURS LATER IF NO BM USE A DULCOLAX SUPPOSITORY 10 MG PER RECTUM. THEN IF NO BM IN 2 HOURS AFTER THE 1ST SUPPOSITORY REPEAT THE SUPPOSITORY. THEN IF NO BM IN 2 HOURS AFTER THE 2ND SUPPOSITORY  USE A FLEETS ENEMA. Patient Education   calcitonin nasal  Pronunciation: tommy si TOE karie  Brand: Fortical, Miacalcin Nasal  What is the most important information I should know about calcitonin nasal?  Follow all directions on your medicine label and package. Tell each of your healthcare providers about all your medical conditions, allergies, and allmedicines you use. What is calcitonin nasal?  Calcitonin is a man-made form of a hormone that occurs naturally in the thyroidgland. Calcitonin nasal is used to treat osteoporosis in women who have been inmenopause for at least 5 years. Calcitonin nasal may also be used for purposes not listed in this medicationguide.   What should I discuss with my healthcare provider before using calcitonin nasal?  You should not use this medicine if you are allergic to salmon calcitonin. To make sure calcitonin nasal is safe for you, tell your doctor if you have:   high levels of calcium in your blood (hypercalcemia); or   any nasal or sinus problems such as nasal deformities, a chronic infection, or nasal pain. Using calcitonin nasal may increase your risk of developing certain types ofcancer. Ask your doctor about your specific risk. FDA pregnancy category C. It is not known whether calcitonin nasal will harm an unborn baby. Tell your doctor if you are pregnant or plan to become pregnantwhile using this medicine. It is not known whether calcitonin nasal passes into breast milk or if it couldharm a nursing baby. You should not breast-feed while using this medicine. How should I use calcitonin nasal?  To make sure you are not allergic to calcitonin, your doctor may perform anallergy skin test before your first dose of this medicine. Follow all directions on your prescription label. Do not use this medicine inlarger or smaller amounts or for longer than recommended. Calcitonin nasal comes with patient instructions for safe and effective use. Follow these directions carefully. Ask your doctor or pharmacist if you haveany questions. Store unopened calcitonin nasal in the refrigerator but do not allow it tofreeze. Once you put the bottle and pump assembly together, you may keep the medicine at room temperature, away from light and heat. Store the bottle upright withthe pump attached. Before your first use of the assembled bottle and pump, you must prime thespray. Allow the medicine to reach room temperature before priming. To prime calcitonin nasal, press the pump at least 5 times until a fine mist appears. Prime the spray only before your first use of the newly assembled bottle. To use the nasal spray:   Blow your nose gently.  Keep your head upright and insert the tip of bottle into one nostril. Hold the nasal spray upright with your first and second fingers on each side of the pump and your thumb on the bottom of the bottle.  Pump the spray unit firmly to spray the medicine into your nostril. You do not need to sniff or inhale deeply.  Do not blow your nose for at least a few minutes after using the nasal spray.  If the spray gets in your eyes or mouth or on your skin, rinse with water. Calcitonin nasal is usually given as one spray per day into only one of your nostrils. Use the other nostril the next day and continue alternating back and forth foreach daily dose. Calcitonin nasal spray delivers a fine mist into the nose. Even if you do not feel the spray while using it, the medication is still being absorbed by yournasal passages. Each bottle contains 30 doses of calcitonin nasal. Throw away the medicineafter 30 uses, even if there is still medicine left in the bottle. To be sure this medication is not causing harmful side effects to the inside ofyour nose, your doctor may want to check your nasal passages often. Your doctor may have you take extra vitamin D while you are using calcitoninnasal. Take only the amount of vitamin D that your doctor has prescribed. What happens if I miss a dose? Use the missed dose as soon as you remember. Skip the missed dose if it is almost time for your next scheduled dose. Do not use extra medicine to make up the missed dose. What happens if I overdose? Seek emergency medical attention or call the Poison Help line at 1-108.966.7445. What should I avoid while using calcitonin nasal?  Avoid using any other nasal sprays or nasal medicines at the same time you usecalcitonin nasal unless your doctor has told you to.   What are the possible side effects of calcitonin nasal?  Get emergency medical help if you have any of these signs of an allergic reaction:  hives; fast heartbeats; feeling lightheaded; chest pain, difficult breathing;swelling of your face, lips, tongue, or throat. Call your doctor at once if you have:   a light-headed feeling, like you might pass out;   severe nasal irritation; or   low levels of calcium in your blood --numbness or tingly feeling around your mouth, fast or slow heart rate, muscle tightness or contraction, overactive reflexes. Common side effects may include:   bleeding from your nose;   runny or stuffy nose;   dryness, itching, tenderness, or general discomfort of your nose; or   crusting, scabs, or sores inside your nose. This is not a complete list of side effects and others may occur. Call your doctor for medical advice about side effects. You may report side effects toFDA at 0-595-UDC-[phone]. What other drugs will affect calcitonin nasal?  Other drugs may interact with calcitonin, including prescription and over-the-counter medicines, vitamins, and herbal products. Tell each of your health care providers about all medicines you use now and any medicine youstart or stop using. Where can I get more information? Your pharmacist can provide more information about calcitonin nasal.  Remember, keep this and all other medicines out of the reach of children, never share your medicines with others, and use this medication only for the indication prescribed. Every effort has been made to ensure that the information provided by [facility] [address] is accurate, up-to-date, and complete, but no guarantee is made to that effect. Drug information contained herein may be time sensitive. [facility] information has been compiled for use by healthcare practitioners and consumers in the [place] and therefore [employer] does not warrant that uses outside of the [place] are appropriate, unless specifically indicated otherwise. [facility]'s drug information does not endorse drugs, diagnose patients or recommend therapy.  [employer]'s drug information is an informational resource designed to assist licensed healthcare practitioners in caring for their patients and/or to serve consumers viewing this service as a supplement to, and not a substitute for, the expertise, skill, knowledge and judgment of healthcare practitioners. The absence of a warning for a given drug or drug combination in no way should be construed to indicate that the drug or drug combination is safe, effective or appropriate for any given patient. [facility] does not assume any responsibility for any aspect of healthcare administered with the aid of information [facility] provides. The information contained herein is not intended to cover all possible uses, directions, precautions, warnings, drug interactions, allergic reactions, or adverse effects. If you have questions about the drugs you are taking, check with yourdoctor, nurse or pharmacist.  Copyright 7026-9051 [address]. Version: 6.01. Revision date:11/17/2014. Care instructions adapted under license by [facility] ([facility]). If you have questions about a medical condition or this instruction, always ask your healthcare professional. [place] [address] any warranty or liability for your use of this information.

## 2022-04-01 NOTE — PROGRESS NOTES
Huyen Huitron (:  1980) is a 46 y.o. male,Established patient, here for evaluation of the following chief complaint(s): Other (pt c/o broken ribs after being kicked by a horse, 3 broken ribs, right hip apin and rigth knee all black and blue x 4 days ago)       ASSESSMENT/PLAN:  56    Patient Instructions   Miya Horn was seen today for other. Diagnoses and all orders for this visit:    Closed fracture of multiple ribs, unspecified laterality, sequela  -     traMADol (ULTRAM) 50 MG tablet; Take 1-2 tablets by mouth every 8 hours as needed for Pain for up to 20 days. Intended supply: 5 days. Take lowest dose possible to manage pain  -     calcitonin (MIACALCIN) 200 UNIT/ACT nasal spray; 1 spray by Nasal route daily krupa  Drink 3  8 oz Glasses of milk or take Calcium 500 mg twice daily. Continue Naproxen 50 mg Twice daily with meals. Can add Tylenol 1000 mg up to 3 x/day in between as needed. Gastroesophageal reflux disease, unspecified whether esophagitis present  -     omeprazole (PRILOSEC) 40 MG delayed release capsule; Take 1 capsule by mouth Daily    Insomnia due to medical condition  Trazodone 50 mg 2 at bedtime. Constipation  Miralax 17 mg or less daily. Constipation, ACUTE  IF NO BM FOR 36 HOURS TAKE MILK OF MAGNESIA 2 TABLESPOONS OR A LAXATIVE LIKE DULCOLAX OR SENOKOT OVER THE COUNTER. THEN 12 HOURS LATER IF NO BM USE A DULCOLAX SUPPOSITORY 10 MG PER RECTUM. THEN IF NO BM IN 2 HOURS AFTER THE 1ST SUPPOSITORY REPEAT THE SUPPOSITORY. THEN IF NO BM IN 2 HOURS AFTER THE 2ND SUPPOSITORY  USE A FLEETS ENEMA. Patient Education   calcitonin nasal  Pronunciation: tommy si TOE karie  Brand: Fortical, Miacalcin Nasal  What is the most important information I should know about calcitonin nasal?  Follow all directions on your medicine label and package. Tell each of your healthcare providers about all your medical conditions, allergies, and allmedicines you use.   What is calcitonin nasal?  Calcitonin is a man-made form of a hormone that occurs naturally in the thyroidgland. Calcitonin nasal is used to treat osteoporosis in women who have been inmenopause for at least 5 years. Calcitonin nasal may also be used for purposes not listed in this medicationguide. What should I discuss with my healthcare provider before using calcitonin nasal?  You should not use this medicine if you are allergic to salmon calcitonin. To make sure calcitonin nasal is safe for you, tell your doctor if you have:   high levels of calcium in your blood (hypercalcemia); or   any nasal or sinus problems such as nasal deformities, a chronic infection, or nasal pain. Using calcitonin nasal may increase your risk of developing certain types ofcancer. Ask your doctor about your specific risk. FDA pregnancy category C. It is not known whether calcitonin nasal will harm an unborn baby. Tell your doctor if you are pregnant or plan to become pregnantwhile using this medicine. It is not known whether calcitonin nasal passes into breast milk or if it couldharm a nursing baby. You should not breast-feed while using this medicine. How should I use calcitonin nasal?  To make sure you are not allergic to calcitonin, your doctor may perform anallergy skin test before your first dose of this medicine. Follow all directions on your prescription label. Do not use this medicine inlarger or smaller amounts or for longer than recommended. Calcitonin nasal comes with patient instructions for safe and effective use. Follow these directions carefully. Ask your doctor or pharmacist if you haveany questions. Store unopened calcitonin nasal in the refrigerator but do not allow it tofreeze. Once you put the bottle and pump assembly together, you may keep the medicine at room temperature, away from light and heat. Store the bottle upright withthe pump attached.   Before your first use of the assembled bottle and pump, you must prime thespray. Allow the medicine to reach room temperature before priming. To prime calcitonin nasal, press the pump at least 5 times until a fine mist appears. Prime the spray only before your first use of the newly assembled bottle. To use the nasal spray:   Blow your nose gently. Keep your head upright and insert the tip of bottle into one nostril. Hold the nasal spray upright with your first and second fingers on each side of the pump and your thumb on the bottom of the bottle.  Pump the spray unit firmly to spray the medicine into your nostril. You do not need to sniff or inhale deeply.  Do not blow your nose for at least a few minutes after using the nasal spray.  If the spray gets in your eyes or mouth or on your skin, rinse with water. Calcitonin nasal is usually given as one spray per day into only one of your nostrils. Use the other nostril the next day and continue alternating back and forth foreach daily dose. Calcitonin nasal spray delivers a fine mist into the nose. Even if you do not feel the spray while using it, the medication is still being absorbed by yournasal passages. Each bottle contains 30 doses of calcitonin nasal. Throw away the medicineafter 30 uses, even if there is still medicine left in the bottle. To be sure this medication is not causing harmful side effects to the inside ofyour nose, your doctor may want to check your nasal passages often. Your doctor may have you take extra vitamin D while you are using calcitoninnasal. Take only the amount of vitamin D that your doctor has prescribed. What happens if I miss a dose? Use the missed dose as soon as you remember. Skip the missed dose if it is almost time for your next scheduled dose. Do not use extra medicine to make up the missed dose. What happens if I overdose? Seek emergency medical attention or call the Poison Help line at [phone].   What should I avoid while using calcitonin nasal?  Avoid using any other nasal sprays or nasal medicines at the same time you usecalcitonin nasal unless your doctor has told you to. What are the possible side effects of calcitonin nasal?  Get emergency medical help if you have any of these signs of an allergic reaction:  hives; fast heartbeats; feeling lightheaded; chest pain, difficult breathing;swelling of your face, lips, tongue, or throat. Call your doctor at once if you have:   a light-headed feeling, like you might pass out;   severe nasal irritation; or   low levels of calcium in your blood --numbness or tingly feeling around your mouth, fast or slow heart rate, muscle tightness or contraction, overactive reflexes. Common side effects may include:   bleeding from your nose;   runny or stuffy nose;   dryness, itching, tenderness, or general discomfort of your nose; or   crusting, scabs, or sores inside your nose. This is not a complete list of side effects and others may occur. Call your doctor for medical advice about side effects. You may report side effects toFDA at 2-829-RBW-[phone]. What other drugs will affect calcitonin nasal?  Other drugs may interact with calcitonin, including prescription and over-the-counter medicines, vitamins, and herbal products. Tell each of your health care providers about all medicines you use now and any medicine youstart or stop using. Where can I get more information? Your pharmacist can provide more information about calcitonin nasal.  Remember, keep this and all other medicines out of the reach of children, never share your medicines with others, and use this medication only for the indication prescribed. Every effort has been made to ensure that the information provided by [address] is accurate, up-to-date, and complete, but no guarantee is made to that effect. Drug information contained herein may be time sensitive.  [employer] information has been compiled for use by healthcare practitioners and consumers in the [place] and therefore [employer] does not warrant that uses outside of the [place] are appropriate, unless specifically indicated otherwise. [employer] drug information does not endorse drugs, diagnose patients or recommend therapy. [facility] drug information is an informational resource designed to assist licensed healthcare practitioners in caring for their patients and/or to serve consumers viewing this service as a supplement to, and not a substitute for, the expertise, skill, knowledge and judgment of healthcare practitioners. The absence of a warning for a given drug or drug combination in no way should be construed to indicate that the drug or drug combination is safe, effective or appropriate for any given patient. [facility] does not assume any responsibility for any aspect of healthcare administered with the aid of information [facility] provides. The information contained herein is not intended to cover all possible uses, directions, precautions, warnings, drug interactions, allergic reactions, or adverse effects. If you have questions about the drugs you are taking, check with yourdoctor, nurse or pharmacist.  Copyright 7295-0248 [address] Version: 6.01. Revision date:11/17/2014. Care instructions adapted under license by [facility] ([facility]). If you have questions about a medical condition or this instruction, always ask your healthcare professional. [place] [address] any warranty or liability for your use of this information. Mucinex DM recommended this often cough to decrease pain. Also encourage use of a pillow to hold against chest at the site fractures during coughing. Return in about 1 month (around 5/3/2022). Subjective   SUBJECTIVE/OBJECTIVE:  Chief Complaint   Patient presents with    Other     pt c/o broken ribs after being kicked by a horse, 3 broken ribs, right hip apin and rigth knee all black and blue x 4 days ago   428  HPI  Broken ribs per patient.   Monday Contusion of wrist, right 2016    Dislocation of part of left shoulder girdle 1985    arm pulled out of socket - no medical visit at the time    Erosive gastropathy 2018    antrum    Essential hypertension 2006    lost 100 lbs in 1 year    GERD (gastroesophageal reflux disease)     Head injury due to trauma     Frontal lobe trauma causing seizures and shakes. No workup till after seizures/shakes appeared    Major depression in complete remission ([address] 2006    while on medicine    Osteoarthritis of left AC (acromioclavicular) joint     Osteomyelitis ([address] 2003    right tibia    Partial complex seizure disorder without intractable epilepsy ([address] 2006    fist clenching is a typical pre-seizure activity for him    Psychiatric diagnosis     Saw Dr. Moises Cali  [phone]    Social anxiety disorder 2006    moderate       Past Surgical History:   Procedure Laterality Date    APPENDECTOMY  2012    lap appy    ELBOW DEBRIDEMENT Right     MRSA    KNEE SURGERY Right     debridement osteomyelitis    MOUTH SURGERY Left 2019    Lower molar removal    UPPER GASTROINTESTINAL ENDOSCOPY  2010    UPPER GASTROINTESTINAL ENDOSCOPY  2018    gastric and esophageal bx, erosive gastropathy    WISDOM TOOTH EXTRACTION Bilateral     #4 removed       Social History     Socioeconomic History    Marital status: Single     Spouse name: Not on file    Number of children: 0    Years of education: 15    Highest education level: Not on file   Occupational History    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    Tobacco Use    Smoking status: Current Every Day Smoker     Packs/day: 1.00     Types: Cigarettes     Last attempt to quit: 3/6/2018     Years since quittin.0    Smokeless tobacco: Never Used    Tobacco comment: social.  Have almost quit (nicorette) 3/13/17. Restarted 12/21/18 < 1 PPD. .5/6/19. 1-1.5 PPD. Plans on quit. 9/25/20. He is at 1 pack/day. 4/1/21. 1/2 PPD. 1.5 PPD. 4/1/22   Vaping Use    Vaping Use: Never used   Substance and Sexual Activity    Alcohol use: Yes     Alcohol/week: 6.0 standard drinks     Types: 6 Cans of beer per week     Comment: Occasional 3-4 beers. Has not drank at all for 4 weeks. doesn't want to lose GF. 6 pack/nite. Tried AA but it was at [employer].  Drug use: Not Currently     Types: Marijuana Tracie Kales)    Sexual activity: Not Currently   Other Topics Concern    Not on file   Social History Narrative    Only exercise is work. 6/8/17. 9/18/17. 4/12/18. 7/23/18. 10/22/18. 1/21/19. 5/6/19. 8/9/19.11/27/19. Less time shoeing horse. Only exercises work. 9/25/20. No exercise. 1/8/21. 4/1/21. Plans walking trails. 8/4/21. Not currently hiking. 2/2/22. None with injury. Social Determinants of Health     Financial Resource Strain: Low Risk     Difficulty of Paying Living Expenses: Not hard at all   Food Insecurity: No Food Insecurity    Worried About Running Out of Food in the Last Year: Never true    Es of Food in the Last Year: Never true   Transportation Needs: No Transportation Needs    Lack of Transportation (Medical): No    Lack of Transportation (Non-Medical):  No   Physical Activity:     Days of Exercise per Week: Not on file    Minutes of Exercise per Session: Not on file   Stress:     Feeling of Stress : Not on file   Social Connections:     Frequency of Communication with Friends and Family: Not on file    Frequency of Social Gatherings with Friends and Family: Not on file    Attends [employer] Services: Not on file    Active Member of Clubs or Organizations: Not on file    Attends Club or Organization Meetings: Not on file    Marital Status: Not on file   Intimate Partner Violence:     Fear of Current or Ex-Partner: Not on file   Fritz Dickens Emotionally Abused: Not on file    Physically Abused: Not on file    Sexually Abused: Not on file   Housing Stability:     Unable to Pay for Housing in the Last Year: Not on file    Number of Places Lived in the Last Year: Not on file    Unstable Housing in the Last Year: Not on file       Family History   Problem Relation Age of Onset    Diabetes Mother    Edith Barone Rheum Arthritis Mother     Depression Mother     Hypertension Mother     High Cholesterol Mother     Osteoarthritis Mother         Mother had a knee replacement x 2    Obesity Mother     Other Mother         varicose veins    Diabetes Father     High Blood Pressure Father     High Cholesterol Father     Other Father         interstitial cystitis, DDD C spine    Heart Disease Father         TACHYCARDIA    Mental Illness Brother         schizophrenia - dissociated    Obesity Brother     Diabetes Brother     Liver Disease Brother         fatty liver    High Cholesterol Brother     Hypothyroidism Brother     Obesity Brother     Other Brother         ervin, appendicitis    Glaucoma Brother         occular htn    Other Brother         tonsilitis, gall bladder    Stroke Maternal Grandmother     Diabetes Maternal Grandfather     Coronary Art Dis Maternal Grandfather     Heart Surgery Maternal Grandfather 54        CABG    Other Maternal Grandfather         PAD with amputations    Diabetes Paternal Grandmother     Heart Surgery Paternal Grandmother         aortic valve replacement transvascular    Glaucoma Paternal Great Grandmother     Macular Degen Paternal Great Grandmother     Dementia Paternal Great Grandmother        Allergies   Allergen Reactions    Banana Hives and Itching    Iodides      Other reaction(s): GI Intolerance    Ancef [Cefazolin Sodium] Rash    Iodine Nausea And Vomiting     WITH IODINATED CONTRAST    Shellfish-Derived Products Nausea And Vomiting       Current Outpatient Medications   Medication Sig Dispense Refill    traMADol (ULTRAM) 50 MG tablet Take 1-2 tablets by mouth every 8 hours as needed for Pain for up to 20 days. Intended supply: 5 days. Take lowest dose possible to manage pain 120 tablet 0    calcitonin (MIACALCIN) 200 UNIT/ACT nasal spray 1 spray by Nasal route daily 3.7 mL 0    omeprazole (PRILOSEC) 40 MG delayed release capsule Take 1 capsule by mouth Daily 30 capsule 5    naproxen (NAPROSYN) 500 MG tablet Take 1 tablet by mouth 2 times daily (with meals) 60 tablet 0    nadolol (CORGARD) 20 MG tablet 1/2 tab a daily by mouth For high blood pressure. 45 tablet 0    clonazePAM (KLONOPIN) 1 MG tablet Take 1 tablet by mouth 2 times daily for 90 days. 60 tablet 2    clonazePAM (KLONOPIN) 2 MG tablet Take 1 tablet by mouth 2 times daily as needed (SEIZURES) for up to 91 days.  TAKE TO ABORT SEIZURES 60 tablet 1    gabapentin (NEURONTIN) 400 MG capsule TAKE FOUR CAPSULES BY MOUTH NIGHTLY FOR SLEEP. 120 capsule 2    traZODone (DESYREL) 50 MG tablet Take 1/2-1 tablets by mouth nightly 30 tablet 5    trihexyphenidyl (ARTANE) 2 MG tablet Take 1 tablet by mouth 2 times daily 60 tablet 5    ziprasidone (GEODON) 60 MG capsule Take 1 capsule by mouth 2 times daily (with meals) Take a 60 mg with a 40 mg at dinner 60 capsule 2    ziprasidone (GEODON) 40 MG capsule Take 1 capsule by mouth daily Take with 60 mg at dinner 30 capsule 5    amLODIPine (NORVASC) 2.5 MG tablet TAKE 1 TABLET DAILY 30 tablet 5    Cholecalciferol (VITAMIN D3) 125 MCG (5000 UT) CAPS Indications: Vitamin D Deficiency Take 1 capsule daily by mouth 5 days/week i.e. skip Monday and Thursday 22 capsule 11    atorvastatin (LIPITOR) 10 MG tablet Take 1 tablet by mouth daily 30 tablet 11    lamoTRIgine (LAMICTAL) 200 MG tablet TAKE 2 TABLETS 1 TIME DAILY AT DINNER 60 tablet 5    folic acid (FOLVITE) 313 MCG tablet Take 1 tablet by mouth daily 30 tablet 11    Diclofenac Sodium POWD Apply 2 g topically 4 times daily Formula #5D Diclo 3% Lido 2% Prilo 2% 240 g 11    diclofenac sodium (VOLTAREN) 1 % GEL Apply 4 g topically 4 times daily as needed for Pain      fexofenadine (ALLEGRA) 180 MG tablet Take 1 tablet by mouth daily 30 tablet 11    acetaminophen (TYLENOL) 500 MG tablet Take 2 tablets by mouth 3 times daily as needed for Pain 180 tablet 0    GLUCOSAMINE-CHONDROITIN ER PO Take by mouth 2 times daily      guaiFENesin (MUCINEX) 600 MG extended release tablet Take 600 mg by mouth daily       Melatonin 5 MG TABS tablet Take 1 tablet by mouth nightly      therapeutic multivitamin-minerals (THERAGRAN-M) tablet Take 1 tablet by mouth daily. No current facility-administered medications for this visit. Objective   Physical Exam  Vitals and nursing note reviewed. Constitutional:       Appearance: Normal appearance. Pulmonary:      Breath sounds: No decreased breath sounds, wheezing, rhonchi or rales. Chest:       Musculoskeletal:        Arms:         Legs:    Neurological:      Mental Status: He is alert. Psychiatric:         Attention and Perception: Attention and perception normal.         Mood and Affect: Mood and affect normal.         Speech: Speech normal.         Behavior: Behavior normal. Behavior is cooperative. Cognition and Memory: Cognition and memory normal.         Judgment: Judgment normal.       Right rib x-rays done at urgent care 3/31/22:  \"Multiple right rib fractures including sixth and seventh ribs laterally to anterolaterally with displacement. .  Acute or subacute. Suspected additional chronic or subacute fracture of the adjacent fifth rib. Chronic appearing fractures of the posterior lateral aspect of the right seventh and eighth ribs. No pleural effusion or pneumothorax. Heart size is normal.  Impression: Acute rib fractures.   See above\"       On this date 4/1/2022 I have spent 50 minutes reviewing previous notes, test results and face to face with the patient discussing the diagnosis and

## 2022-04-01 NOTE — PROGRESS NOTES
Received a call from the patient this evening while on call. He states that he went to  the tramadol that was prescribed today at [facility] but was told the medication would cost $90. He cannot afford this. Has broken ribs and saw Dr. Kerri Leon today. Resent the prescription to [facility] to use Good[facility]. [place]AR[place] reviewed, and patient did not  from [facility]. Educated that controlled substances will not routinely be filled while on call.

## 2022-04-02 NOTE — PROGRESS NOTES
Called the patient. He used the Good Rx recommendation and the prescription only cost him $10.  Encouraged him to download the good Rx goldy on his cell phone which was free at the [facility]. Patient agreed.

## 2022-04-04 ENCOUNTER — TELEPHONE (OUTPATIENT)
Dept: ADMINISTRATIVE | Age: 42
End: 2022-04-04

## 2022-04-05 NOTE — TELEPHONE ENCOUNTER
Received APPROVAL for Tramadol. Medication has been approved through 07/03/2022. Letter is attached. If this requires a response please respond to the pool. [address])    Please advise patient. Thank you.

## 2022-04-19 ENCOUNTER — TELEPHONE (OUTPATIENT)
Dept: FAMILY MEDICINE CLINIC | Age: 42
End: 2022-04-19

## 2022-04-19 NOTE — TELEPHONE ENCOUNTER
Spoke with Maryam CARPIO. Patient was sent to the ER for evaluation of hemoptysis after rib fractures.

## 2022-04-19 NOTE — TELEPHONE ENCOUNTER
JUST FYI - per Dr. Rodolfo Barrett should go to the ER - patient is coughing up blood - broken rib from being kick by a horse.

## 2022-04-20 ENCOUNTER — HOSPITAL ENCOUNTER (EMERGENCY)
Age: 42
Discharge: HOME | End: 2022-04-20
Attending: EMERGENCY MEDICINE
Payer: COMMERCIAL

## 2022-04-20 VITALS
DIASTOLIC BLOOD PRESSURE: 107 MMHG | WEIGHT: 200 LBS | RESPIRATION RATE: 14 BRPM | BODY MASS INDEX: 30.31 KG/M2 | HEIGHT: 68 IN | TEMPERATURE: 98.1 F | SYSTOLIC BLOOD PRESSURE: 132 MMHG | OXYGEN SATURATION: 97 % | HEART RATE: 101 BPM

## 2022-04-20 DIAGNOSIS — R56.9 SEIZURE-LIKE ACTIVITY (HCC): Primary | ICD-10-CM

## 2022-04-20 LAB
A/G RATIO: 2 (ref 1.1–2.2)
ALBUMIN SERPL-MCNC: 4.9 G/DL (ref 3.4–5)
ALP BLD-CCNC: 70 U/L (ref 40–129)
ALT SERPL-CCNC: 28 U/L (ref 10–40)
ANION GAP SERPL CALCULATED.3IONS-SCNC: 14 MMOL/L (ref 3–16)
AST SERPL-CCNC: 26 U/L (ref 15–37)
BASOPHILS ABSOLUTE: 0.1 K/UL (ref 0–0.2)
BASOPHILS RELATIVE PERCENT: 1 %
BILIRUB SERPL-MCNC: 0.5 MG/DL (ref 0–1)
BUN BLDV-MCNC: 17 MG/DL (ref 7–20)
CALCIUM SERPL-MCNC: 9.9 MG/DL (ref 8.3–10.6)
CHLORIDE BLD-SCNC: 99 MMOL/L (ref 99–110)
CO2: 24 MMOL/L (ref 21–32)
CREAT SERPL-MCNC: 0.8 MG/DL (ref 0.9–1.3)
EOSINOPHILS ABSOLUTE: 0.1 K/UL (ref 0–0.6)
EOSINOPHILS RELATIVE PERCENT: 2.1 %
GFR AFRICAN AMERICAN: >60
GFR NON-AFRICAN AMERICAN: >60
GLUCOSE BLD-MCNC: 89 MG/DL (ref 70–99)
HCT VFR BLD CALC: 41 % (ref 40.5–52.5)
HEMOGLOBIN: 14.1 G/DL (ref 13.5–17.5)
LYMPHOCYTES ABSOLUTE: 2.6 K/UL (ref 1–5.1)
LYMPHOCYTES RELATIVE PERCENT: 43.2 %
MCH RBC QN AUTO: 32.2 PG (ref 26–34)
MCHC RBC AUTO-ENTMCNC: 34.5 G/DL (ref 31–36)
MCV RBC AUTO: 93.3 FL (ref 80–100)
MONOCYTES ABSOLUTE: 0.6 K/UL (ref 0–1.3)
MONOCYTES RELATIVE PERCENT: 9.9 %
NEUTROPHILS ABSOLUTE: 2.6 K/UL (ref 1.7–7.7)
NEUTROPHILS RELATIVE PERCENT: 43.8 %
PDW BLD-RTO: 13.9 % (ref 12.4–15.4)
PLATELET # BLD: 316 K/UL (ref 135–450)
PMV BLD AUTO: 6.6 FL (ref 5–10.5)
POTASSIUM REFLEX MAGNESIUM: 4 MMOL/L (ref 3.5–5.1)
RBC # BLD: 4.39 M/UL (ref 4.2–5.9)
SODIUM BLD-SCNC: 137 MMOL/L (ref 136–145)
TOTAL PROTEIN: 7.3 G/DL (ref 6.4–8.2)
WBC # BLD: 6 K/UL (ref 4–11)

## 2022-04-20 PROCEDURE — 85025 COMPLETE CBC W/AUTO DIFF WBC: CPT

## 2022-04-20 PROCEDURE — 99283 EMERGENCY DEPT VISIT LOW MDM: CPT

## 2022-04-20 PROCEDURE — 36415 COLL VENOUS BLD VENIPUNCTURE: CPT

## 2022-04-20 PROCEDURE — 80053 COMPREHEN METABOLIC PANEL: CPT

## 2022-04-20 ASSESSMENT — PAIN SCALES - GENERAL: PAINLEVEL_OUTOF10: 8

## 2022-04-20 ASSESSMENT — PAIN - FUNCTIONAL ASSESSMENT: PAIN_FUNCTIONAL_ASSESSMENT: 0-10

## 2022-04-20 NOTE — ED PROVIDER NOTES
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  No workup till after seizures/shakes appeared    Major depression in complete remission ([facility] [address] 01/01/2006    while on medicine    Osteoarthritis of left AC (acromioclavicular) joint     Osteomyelitis ([facility] [address] 01/01/2003    right tibia    Partial complex seizure disorder without intractable epilepsy ([facility] [address] 01/01/2006    fist clenching is a typical pre-seizure activity for him    Psychiatric diagnosis     Saw Dr. Nelson Needs  [phone]    Social anxiety disorder 01/01/2006    moderate     Past Surgical History:   Procedure Laterality Date    APPENDECTOMY  05/22/2012    lap appy    ELBOW DEBRIDEMENT Right     MRSA    KNEE SURGERY Right     debridement osteomyelitis    MOUTH SURGERY Left 2019    Lower molar removal    UPPER GASTROINTESTINAL ENDOSCOPY  12/16/2010    UPPER GASTROINTESTINAL ENDOSCOPY  03/23/2018    gastric and esophageal bx, erosive gastropathy    WISDOM TOOTH EXTRACTION Bilateral 1998    #4 removed     Family History   Problem Relation Age of Onset    Diabetes Mother    [facility] Rheum Arthritis Mother     Depression Mother     Hypertension Mother     High Cholesterol Mother     Osteoarthritis Mother         Mother had a knee replacement x 2    Obesity Mother     Other Mother         varicose veins    Diabetes Father     High Blood Pressure Father     High Cholesterol Father     Other Father         interstitial cystitis, DDD C spine    Heart Disease Father         TACHYCARDIA    Mental Illness Brother         schizophrenia - dissociated    Obesity Brother     Diabetes Brother     Liver Disease Brother         fatty liver    High Cholesterol Brother     Hypothyroidism Brother     Obesity Brother     Other Brother         ervin, appendicitis    Glaucoma Brother         occular htn    Other Brother         tonsilitis, gall bladder    Stroke Maternal Grandmother     Diabetes Maternal Grandfather     Coronary Art Dis Maternal Grandfather  Heart Surgery Maternal Grandfather 54        CABG    Other Maternal Grandfather         PAD with amputations    Diabetes Paternal Grandmother     Heart Surgery Paternal Grandmother         aortic valve replacement transvascular    Glaucoma Paternal Great Grandmother     Macular Degen Paternal Great Grandmother     Dementia Paternal Great Grandmother      Social History     Socioeconomic History    Marital status: Single     Spouse name: Not on file    Number of children: 0    Years of education: 15    Highest education level: Not on file   Occupational History    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 Tobacco Use    Smoking status: Current Every Day Smoker     Packs/day: 1.00     Types: Cigarettes     Last attempt to quit: 3/6/2018     Years since quittin.1    Smokeless tobacco: Never Used    Tobacco comment: social.  Have almost quit (nicorette) 3/13/17. Restarted 18 < 1 PPD. .19. 1-1.5 PPD. Plans on quit. 20. He is at 1 pack/day. 21. 1/2 PPD. 1.5 PPD. 22   Vaping Use    Vaping Use: Never used   Substance and Sexual Activity    Alcohol use: Yes     Alcohol/week: 6.0 standard drinks     Types: 6 Cans of beer per week     Comment: Occasional 3-4 beers. Has not drank at all for 4 weeks. doesn't want to lose GF. 6 pack/nite. Tried AA but it was at [facility].  Drug use: Not Currently     Types: Marijuana [place])    Sexual activity: Not Currently   Other Topics Concern    Not on file   Social History Narrative    Only exercise is work. 17. 9//17. 4//18. 7/23/18. 10/22/18. 1//. 5//. 8//./. Less time shoeing horse. Only exercises work. 20. No exercise. 21. 21. Plans walking trails. 21. Not currently hiking. 22. None with injury.      Social Determinants of Health     Financial Resource Strain: Low Risk     Difficulty of Paying Living Expenses: Not hard at all   Food Insecurity: No Food Insecurity    Worried About Running Out of Food in the Last Year: Never true    Es of Food in the Last Year: Never true   Transportation Needs: No Transportation Needs    Lack of Transportation (Medical): No    Lack of Transportation (Non-Medical): No   Physical Activity:     Days of Exercise per Week: Not on file    Minutes of Exercise per Session: Not on file   Stress:     Feeling of Stress : Not on file   Social Connections:     Frequency of Communication with Friends and Family: Not on file    Frequency of Social Gatherings with Friends and Family: Not on file    Attends Sabianism Services: Not on file    Active Member of [address] [place] [address] [employer] or Organizations: Not on file    Attends Club or Organization Meetings: Not on file    Marital Status: Not on file   Intimate Partner Violence:     Fear of Current or Ex-Partner: Not on file    Emotionally Abused: Not on file    Physically Abused: Not on file    Sexually Abused: Not on file   Housing Stability:     Unable to Pay for Housing in the Last Year: Not on file    Number of [place] in the Last Year: Not on file    Unstable Housing in the Last Year: Not on file     No current facility-administered medications for this encounter. Current Outpatient Medications   Medication Sig Dispense Refill    calcitonin (MIACALCIN) 200 UNIT/ACT nasal spray 1 spray by Nasal route daily 3.7 mL 0    omeprazole (PRILOSEC) 40 MG delayed release capsule Take 1 capsule by mouth Daily 30 capsule 5    traMADol (ULTRAM) 50 MG tablet Take 1-2 tablets by mouth every 8 hours as needed for Pain for up to 30 days. Intended supply: 5 days.  Take lowest dose possible to manage pain 120 tablet 0    naproxen (NAPROSYN) 500 MG tablet Take 1 tablet by mouth 2 times daily (with meals) 60 tablet 0    nadolol (CORGARD) 20 MG tablet 1/2 tab a daily by mouth For high blood pressure. 45 tablet 0    clonazePAM (KLONOPIN) 1 MG tablet Take 1 tablet by mouth 2 times daily for 90 days. 60 tablet 2    clonazePAM (KLONOPIN) 2 MG tablet Take 1 tablet by mouth 2 times daily as needed (SEIZURES) for up to 91 days.  TAKE TO ABORT SEIZURES 60 tablet 1    gabapentin (NEURONTIN) 400 MG capsule TAKE FOUR CAPSULES BY MOUTH NIGHTLY FOR SLEEP. 120 capsule 2    traZODone (DESYREL) 50 MG tablet Take 1/2-1 tablets by mouth nightly 30 tablet 5    trihexyphenidyl (ARTANE) 2 MG tablet Take 1 tablet by mouth 2 times daily 60 tablet 5    ziprasidone (GEODON) 60 MG capsule Take 1 capsule by mouth 2 times daily (with meals) Take a 60 mg with a 40 mg at dinner 60 capsule 2    ziprasidone (GEODON) 40 MG capsule Take 1 capsule by mouth daily Take with 60 mg at dinner 30 capsule 5    amLODIPine (NORVASC) 2.5 MG tablet TAKE 1 TABLET DAILY 30 tablet 5    Cholecalciferol (VITAMIN D3) 125 MCG (5000 UT) CAPS Indications: Vitamin D Deficiency Take 1 capsule daily by mouth 5 days/week i.e. skip Monday and Thursday 22 capsule 11    atorvastatin (LIPITOR) 10 MG tablet Take 1 tablet by mouth daily 30 tablet 11    lamoTRIgine (LAMICTAL) 200 MG tablet TAKE 2 TABLETS 1 TIME DAILY AT DINNER 60 tablet 5    folic acid (FOLVITE) 771 MCG tablet Take 1 tablet by mouth daily 30 tablet 11    Diclofenac Sodium POWD Apply 2 g topically 4 times daily Formula #5D Diclo 3% Lido 2% Prilo 2% 240 g 11    diclofenac sodium (VOLTAREN) 1 % GEL Apply 4 g topically 4 times daily as needed for Pain      fexofenadine (ALLEGRA) 180 MG tablet Take 1 tablet by mouth daily 30 tablet 11    acetaminophen (TYLENOL) 500 MG tablet Take 2 tablets by mouth 3 times daily as needed for Pain 180 tablet 0    GLUCOSAMINE-CHONDROITIN ER PO Take by mouth 2 times daily      guaiFENesin (MUCINEX) 600 MG extended release tablet Take 600 mg by mouth daily       Melatonin 5 MG TABS tablet Take 1 tablet by mouth nightly      therapeutic multivitamin-minerals (THERAGRAN-M) tablet Take 1 tablet by mouth daily. Allergies   Allergen Reactions    Banana Hives and Itching    Iodides      Other reaction(s): GI Intolerance    Ancef [Cefazolin Sodium] Rash    Iodine Nausea And Vomiting     WITH IODINATED CONTRAST    Shellfish-Derived Products Nausea And Vomiting       REVIEW OF SYSTEMS    General:  No fevers  Eyes:  No recent vison changes  ENT:  No sore throat, no nasal congestion  Cardiovascular:  no palpitations  Respiratory:   no cough, no wheezing  Gastrointestinal:  No abdominal pain, no vomiting, no diarrhea  Musculoskeletal:  No muscle pain, no joint pain  Skin:  No rash   Neurologic:  No speech problems, no headache, no extremity numbness, no extremity weakness. Seizure-like activity. Genitourinary:  No dysuria  Extremities:  no edema, no pain      Unless otherwise stated in this report, this patient's positive and negative responses for review of systems (constitutional, eyes, ENT, cardiovascular, respiratory, gastrointestinal, neurological, genitourinary, musculoskeletal, integument systems and systems related to the presenting problem) are either stated in the preceding paragraph, were not pertinent or were negative for the symptoms and/or complaints related to the medical problem. PHYSICAL EXAM  BP (!) 132/107   Pulse 101   Temp 98.1 °F (36.7 °C) (Oral)   Resp 14   Ht 5' 8\" (1.727 m)   Wt 200 lb (90.7 kg)   SpO2 97%   BMI 30.41 kg/m²   GENERAL APPEARANCE: Awake and alert. Cooperative. No acute distress. HEAD: Normocephalic. Atraumatic. EYES: PERRL. EOM's grossly intact. ENT: Mucous membranes are moist.   NECK: Supple, trachea midline. No C-spine tenderness. HEART: RRR. LUNGS: Respirations unlabored. CTAB. Good air exchange. No wheezes, rales, or rhonchi. Speaking comfortably in full sentences. ABDOMEN: Soft. Non-distended. Non-tender. No guarding or rebound. EXTREMITIES: No peripheral edema. MAEE.  No acute deformities. SKIN: Warm, dry and intact. No acute rashes. NEUROLOGICAL: Alert and oriented X 3. CN II-XII grossly intact. Strength 5/5, sensation intact. Normal coordination. Steady gait. PSYCHIATRIC: Normal mood and affect. LABS  I have reviewed all labs for this visit. Results for orders placed or performed during the hospital encounter of 04/20/22   CBC with Auto Differential   Result Value Ref Range    WBC 6.0 4.0 - 11.0 K/uL    RBC 4.39 4.20 - 5.90 M/uL    Hemoglobin 14.1 13.5 - 17.5 g/dL    Hematocrit 41.0 40.5 - 52.5 %    MCV 93.3 80.0 - 100.0 fL    MCH 32.2 26.0 - 34.0 pg    MCHC 34.5 31.0 - 36.0 g/dL    RDW 13.9 12.4 - 15.4 %    Platelets 790 090 - 133 K/uL    MPV 6.6 5.0 - 10.5 fL    Neutrophils % 43.8 %    Lymphocytes % 43.2 %    Monocytes % 9.9 %    Eosinophils % 2.1 %    Basophils % 1.0 %    Neutrophils Absolute 2.6 1.7 - 7.7 K/uL    Lymphocytes Absolute 2.6 1.0 - 5.1 K/uL    Monocytes Absolute 0.6 0.0 - 1.3 K/uL    Eosinophils Absolute 0.1 0.0 - 0.6 K/uL    Basophils Absolute 0.1 0.0 - 0.2 K/uL   Comprehensive Metabolic Panel w/ Reflex to MG   Result Value Ref Range    Sodium 137 136 - 145 mmol/L    Potassium reflex Magnesium 4.0 3.5 - 5.1 mmol/L    Chloride 99 99 - 110 mmol/L    CO2 24 21 - 32 mmol/L    Anion Gap 14 3 - 16    Glucose 89 70 - 99 mg/dL    BUN 17 7 - 20 mg/dL    CREATININE 0.8 (L) 0.9 - 1.3 mg/dL    GFR Non-African American >60 >60    GFR African American >60 >60    Calcium 9.9 8.3 - 10.6 mg/dL    Total Protein 7.3 6.4 - 8.2 g/dL    Albumin 4.9 3.4 - 5.0 g/dL    Albumin/Globulin Ratio 2.0 1.1 - 2.2    Total Bilirubin 0.5 0.0 - 1.0 mg/dL    Alkaline Phosphatase 70 40 - 129 U/L    ALT 28 10 - 40 U/L    AST 26 15 - 37 U/L           RADIOLOGY  X-RAYS: ALL IMAGES INCLUDING PLAIN FILMS, CT, ULTRASOUND AND MRI HAVE BEEN READ BY THE RADIOLOGIST. I have personally reviewed plain film images and have reviewed the radiology reports.   No orders to display              Rechecks: Physical assessment performed. Patient is resting comfortably here. Prior to my exam the patient took some of his home medications that he was due for. He has no complaints and is requesting discharge. ED COURSE/MDM  Patient seen and evaluated. Here the patient is afebrile with normal vitals signs. Old records reviewed. Here the patient has no complaints. He has no external signs of trauma. No signs of head trauma. He is awake and alert, NIH stroke scale is 0. He has a history of similar episodes and has been diagnosed with seizures in the past.  Lab work here is reassuring. He has been compliant with his Lamictal at home. I think he is appropriate for discharge. I have counseled him not to drive and not to take a bath or swim unsupervised. He states understanding. I will place a new referral to neurology today as he was lost to follow-up with his prior neurologist.  Genna Going and imaging reviewed and results discussed with patient. Patient was reassessed as noted above . Plan of care discussed with patient. Patient in agreement with plan. Strict return precautions have been given. Patient was given scripts for the following medications. I counseled patient how to take these medications. Discharge Medication List as of 4/20/2022  7:52 PM          No prescriptions given. CLINICAL IMPRESSION  1. Seizure-like activity (HCC)        Blood pressure (!) 132/107, pulse 101, temperature 98.1 °F (36.7 °C), temperature source Oral, resp. rate 14, height 5' 8\" (1.727 m), weight 200 lb (90.7 kg), SpO2 97 %. DISPOSITION  Juan Jose Connors was discharged to home in stable condition.     (Please note this note was completed with a voice recognition program.  Efforts were made to edit the dictations but occasionally words are mis-transcribed.)        Tanja Emmanuel MD  04/22/22 [id number]

## 2022-04-20 NOTE — ED TRIAGE NOTES
Chief Complaint   Patient presents with    Loss of Consciousness     passed out at a gas station. hx of seizures.

## 2022-04-20 NOTE — ED NOTES
Went into to check on patient. States, \" get me to the spicket or get my water. All these pills are gross. \" writer asked patient what meds he took. \" my evening meds.  I take them at 7.\"      Elinor Mims RN  04/20/22 [id number]

## 2022-05-03 ENCOUNTER — OFFICE VISIT (OUTPATIENT)
Dept: FAMILY MEDICINE CLINIC | Age: 42
End: 2022-05-03
Payer: COMMERCIAL

## 2022-05-03 VITALS
RESPIRATION RATE: 20 BRPM | BODY MASS INDEX: 30.62 KG/M2 | DIASTOLIC BLOOD PRESSURE: 72 MMHG | HEART RATE: 70 BPM | SYSTOLIC BLOOD PRESSURE: 126 MMHG | WEIGHT: 202 LBS | OXYGEN SATURATION: 98 % | HEIGHT: 68 IN

## 2022-05-03 DIAGNOSIS — G25.89 EXTRAPYRAMIDAL MOVEMENT DISORDER, DRUG-INDUCED: ICD-10-CM

## 2022-05-03 DIAGNOSIS — G47.01 INSOMNIA DUE TO MEDICAL CONDITION: ICD-10-CM

## 2022-05-03 DIAGNOSIS — F51.04 PSYCHOPHYSIOLOGICAL INSOMNIA: ICD-10-CM

## 2022-05-03 DIAGNOSIS — G40.209 PARTIAL SYMPTOMATIC EPILEPSY WITH COMPLEX PARTIAL SEIZURES, NOT INTRACTABLE, WITHOUT STATUS EPILEPTICUS (HCC): Chronic | ICD-10-CM

## 2022-05-03 DIAGNOSIS — F40.10 SOCIAL ANXIETY DISORDER: ICD-10-CM

## 2022-05-03 DIAGNOSIS — T50.905A EXTRAPYRAMIDAL MOVEMENT DISORDER, DRUG-INDUCED: ICD-10-CM

## 2022-05-03 DIAGNOSIS — F32.5 MAJOR DEPRESSION IN COMPLETE REMISSION (HCC): Chronic | ICD-10-CM

## 2022-05-03 DIAGNOSIS — S22.49XS CLOSED FRACTURE OF MULTIPLE RIBS, UNSPECIFIED LATERALITY, SEQUELA: ICD-10-CM

## 2022-05-03 DIAGNOSIS — I10 ESSENTIAL HYPERTENSION: Primary | ICD-10-CM

## 2022-05-03 DIAGNOSIS — F31.78 BIPOLAR 1 DISORDER, MIXED, FULL REMISSION (HCC): ICD-10-CM

## 2022-05-03 PROCEDURE — G8417 CALC BMI ABV UP PARAM F/U: HCPCS | Performed by: FAMILY MEDICINE

## 2022-05-03 PROCEDURE — G8427 DOCREV CUR MEDS BY ELIG CLIN: HCPCS | Performed by: FAMILY MEDICINE

## 2022-05-03 PROCEDURE — 99215 OFFICE O/P EST HI 40 MIN: CPT | Performed by: FAMILY MEDICINE

## 2022-05-03 PROCEDURE — 4004F PT TOBACCO SCREEN RCVD TLK: CPT | Performed by: FAMILY MEDICINE

## 2022-05-03 RX ORDER — AMLODIPINE BESYLATE 2.5 MG/1
TABLET ORAL
Qty: 30 TABLET | Refills: 5 | Status: SHIPPED | OUTPATIENT
Start: 2022-05-03 | End: 2022-08-09 | Stop reason: SDUPTHER

## 2022-05-03 RX ORDER — NADOLOL 20 MG/1
TABLET ORAL
Qty: 45 TABLET | Refills: 0 | Status: SHIPPED | OUTPATIENT
Start: 2022-05-03 | End: 2022-08-09 | Stop reason: SDUPTHER

## 2022-05-03 RX ORDER — TRIHEXYPHENIDYL HYDROCHLORIDE 2 MG/1
2 TABLET ORAL 2 TIMES DAILY
Qty: 60 TABLET | Refills: 5 | Status: SHIPPED | OUTPATIENT
Start: 2022-05-03 | End: 2022-08-09 | Stop reason: SDUPTHER

## 2022-05-03 RX ORDER — TRAZODONE HYDROCHLORIDE 50 MG/1
TABLET ORAL
Qty: 30 TABLET | Refills: 5 | Status: SHIPPED | OUTPATIENT
Start: 2022-05-03

## 2022-05-03 RX ORDER — GABAPENTIN 400 MG/1
CAPSULE ORAL
Qty: 120 CAPSULE | Refills: 2 | Status: SHIPPED | OUTPATIENT
Start: 2022-05-03 | End: 2022-10-07

## 2022-05-03 RX ORDER — ZIPRASIDONE HYDROCHLORIDE 40 MG/1
CAPSULE ORAL
Qty: 30 CAPSULE | Refills: 5 | Status: SHIPPED | OUTPATIENT
Start: 2022-05-03 | End: 2022-08-09 | Stop reason: SDUPTHER

## 2022-05-03 RX ORDER — ZIPRASIDONE HYDROCHLORIDE 60 MG/1
60 CAPSULE ORAL 2 TIMES DAILY WITH MEALS
Qty: 60 CAPSULE | Refills: 5 | Status: SHIPPED | OUTPATIENT
Start: 2022-05-03 | End: 2022-08-09 | Stop reason: SDUPTHER

## 2022-05-03 RX ORDER — CLONAZEPAM 1 MG/1
1 TABLET ORAL 2 TIMES DAILY
Qty: 60 TABLET | Refills: 2 | Status: SHIPPED | OUTPATIENT
Start: 2022-05-03 | End: 2022-08-09 | Stop reason: SDUPTHER

## 2022-05-03 NOTE — PATIENT INSTRUCTIONS
[facility] was seen today for anxiety and depression. Diagnoses and all orders for this visit:    Essential hypertension  -     amLODIPine (NORVASC) 2.5 MG tablet; TAKE 1 TABLET DAILY  -     nadolol (CORGARD) 20 MG tablet; 1/2 TABLET DAILY BY MOUTH FOR HIGH BLOOD PRESSURE  -     Good control.  -     Continue meds and lifestyle control. Partial symptomatic epilepsy with complex partial seizures, not intractable, without status epilepticus (HCC)  -     clonazePAM (KLONOPIN) 1 MG tablet; Take 1 tablet by mouth 2 times daily for 90 days.  -     gabapentin (NEURONTIN) 400 MG capsule; TAKE FOUR CAPSULES BY MOUTH NIGHTLY FOR SLEEP.  -     Good control.  -     Continue meds and lifestyle control. Social anxiety disorder  -     clonazePAM (KLONOPIN) 1 MG tablet; Take 1 tablet by mouth 2 times daily for 90 days.  -     Good control.  -     Continue meds and lifestyle control. Psychophysiological insomnia  -     traZODone (DESYREL) 50 MG tablet; Take 1/2-1 tablets by mouth nightly  -     Good control.  -     Continue meds and lifestyle control. Extrapyramidal movement disorder, drug-induced  -     trihexyphenidyl (ARTANE) 2 MG tablet; Take 1 tablet by mouth 2 times daily  -     Good control.  -     Continue meds and lifestyle control. Bipolar 1 disorder, mixed, full remission ([address]  -     ziprasidone (GEODON) 60 MG capsule; Take 1 capsule by mouth 2 times daily (with meals) Take a 60 mg with a 40 mg at dinner  -     ziprasidone (GEODON) 40 MG capsule; Take 1 capsule by mouth daily Take with 60 mg at dinner  -     Good control.  -     Continue meds and lifestyle control. Major depression in complete remission (HCC)  -     ziprasidone (GEODON) 60 MG capsule; Take 1 capsule by mouth 2 times daily (with meals) Take a 60 mg with a 40 mg at dinner  -     ziprasidone (GEODON) 40 MG capsule; Take 1 capsule by mouth daily Take with 60 mg at dinner  Stable.     Closed fracture of multiple ribs, unspecified laterality, sequela  Use the Voltaren gel. Insomnia due to medical condition  Continue Trazodone 2 pills nightly.

## 2022-05-03 NOTE — PROGRESS NOTES
Allen Davis (:  1980) is a 46 y.o. male,Established patient, here for evaluation of the following chief complaint(s): Anxiety (FOLLOW UP ON ANXIETY) and Depression (FOLLOW UP ON DEPRESSION)       ASSESSMENT/PLAN:  1215    Patient Instructions   Tomeka Rodríguez was seen today for anxiety and depression. Diagnoses and all orders for this visit:    Essential hypertension  -     amLODIPine (NORVASC) 2.5 MG tablet; TAKE 1 TABLET DAILY  -     nadolol (CORGARD) 20 MG tablet; 1/2 TABLET DAILY BY MOUTH FOR HIGH BLOOD PRESSURE  -     Good control.  -     Continue meds and lifestyle control. Partial symptomatic epilepsy with complex partial seizures, not intractable, without status epilepticus (HCC)  -     clonazePAM (KLONOPIN) 1 MG tablet; Take 1 tablet by mouth 2 times daily for 90 days.  -     gabapentin (NEURONTIN) 400 MG capsule; TAKE FOUR CAPSULES BY MOUTH NIGHTLY FOR SLEEP.  -     Good control.  -     Continue meds and lifestyle control. Social anxiety disorder  -     clonazePAM (KLONOPIN) 1 MG tablet; Take 1 tablet by mouth 2 times daily for 90 days.  -     Good control.  -     Continue meds and lifestyle control. Psychophysiological insomnia  -     traZODone (DESYREL) 50 MG tablet; Take 1/2-1 tablets by mouth nightly  -     Good control.  -     Continue meds and lifestyle control. Extrapyramidal movement disorder, drug-induced  -     trihexyphenidyl (ARTANE) 2 MG tablet; Take 1 tablet by mouth 2 times daily  -     Good control.  -     Continue meds and lifestyle control. Bipolar 1 disorder, mixed, full remission ([facility] [address]  -     ziprasidone (GEODON) 60 MG capsule; Take 1 capsule by mouth 2 times daily (with meals) Take a 60 mg with a 40 mg at dinner  -     ziprasidone (GEODON) 40 MG capsule; Take 1 capsule by mouth daily Take with 60 mg at dinner  -     Good control.  -     Continue meds and lifestyle control.      Major depression in complete remission (HCC)  -     ziprasidone (GEODON) 60 MG capsule; Take 1 capsule by mouth 2 times daily (with meals) Take a 60 mg with a 40 mg at dinner  -     ziprasidone (GEODON) 40 MG capsule; Take 1 capsule by mouth daily Take with 60 mg at dinner  Stable. Closed fracture of multiple ribs, unspecified laterality, sequela  Use the Voltaren gel. Insomnia due to medical condition  Continue Trazodone 2 pills nightly. Return in about 3 months (around 8/3/2022) for Hypertension, Anxiety, GERD, Depression. Subjective   SUBJECTIVE/OBJECTIVE:   Chief Complaint   Patient presents with    Anxiety     FOLLOW UP ON ANXIETY    Depression     FOLLOW UP ON DEPRESSION   1135  HPI     Essential hypertension  Not checking. Is eating frozen dinners. Eats a lot of berries and fruit. Not as much veggies. Adds salt (Solitario's light) eggs. Partial symptomatic epilepsy with complex partial seizures, not intractable, without status epilepticus ([address]  No sz activity since last visit. No SEs with meds. Social anxiety disorder  Is having more anxiety at work. Eduardo Zaman is weird. No panic attacks. Psychophysiological insomnia  More physical.    Extrapyramidal movement disorder, drug-induced  For tremors. Still has slight tremor. PGM also has shakes. Father slight. Bipolar 1 disorder, mixed, full remission ([address]  Passed out. Missed his evening dose of meds. Major depression in complete remission (HCC)  Feels like crying but doesn't. Not sleeping all day. Enjoys recreational activity. Closed fracture of multiple ribs, unspecified laterality, sequela  Not using Diclofenac. Was given the Lidocaine Rx patches - can he used. (Suggested use only at night. Insomnia due to medical condition  Due to rib fractures. For pain takes Tylenol. Taking 2 Trazodone qhs. GERD  He was coughing up blood. Did not go to the ER. Thinks due to the taking Naproxen. He quit.       Review of Systems   Past Medical History:   Diagnosis Date    Abscess of elbow 2012    right    Alcoholism /alcohol abuse 01/01/2005    Stopped 2014    Arthritis     Durham's esophagus     Bipolar 1 disorder, mixed, full remission ([facility] [address] 01/01/2006    Contusion of wrist, right 04/01/2016    Dislocation of part of left shoulder girdle 01/01/1985    arm pulled out of socket - no medical visit at the time    Erosive gastropathy 03/23/2018    antrum    Essential hypertension 01/01/2006    lost 100 lbs in 1 year    GERD (gastroesophageal reflux disease)     Head injury due to trauma     Frontal lobe trauma causing seizures and shakes. No workup till after seizures/shakes appeared    Major depression in complete remission ([address] 01/01/2006    while on medicine    Osteoarthritis of left AC (acromioclavicular) joint     Osteomyelitis ([address] 01/01/2003    right tibia    Partial complex seizure disorder without intractable epilepsy ([address] 01/01/2006    fist clenching is a typical pre-seizure activity for him    Psychiatric diagnosis     Saw Dr. Saji White  [phone]    Social anxiety disorder 01/01/2006    moderate       Past Surgical History:   Procedure Laterality Date    APPENDECTOMY  05/22/2012    lap appy    ELBOW DEBRIDEMENT Right     MRSA    KNEE SURGERY Right     debridement osteomyelitis    MOUTH SURGERY Left 2019    Lower molar removal    UPPER GASTROINTESTINAL ENDOSCOPY  12/16/2010    UPPER GASTROINTESTINAL ENDOSCOPY  03/23/2018    gastric and esophageal bx, erosive gastropathy    WISDOM TOOTH EXTRACTION Bilateral 1998    #4 removed       Social History     Socioeconomic History    Marital status: Single     Spouse name: Not on file    Number of children: 0    Years of education: 15    Highest education level: Not on file   Occupational History    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    Tobacco Use    Smoking status: Current Every Day Smoker     Packs/day: 1.00     Types: Cigarettes     Last attempt to quit: 3/6/2018     Years since quittin.1    Smokeless tobacco: Never Used    Tobacco comment: social.  Have almost quit (nicorette) 3/13/17. Restarted 18 < 1 PPD. .19. 1-1.5 PPD. Plans on quit. 20. He is at 1 pack/day. 21. 1/2 PPD. 1.5 PPD. 22.5/3/21. Vaping Use    Vaping Use: Never used   Substance and Sexual Activity    Alcohol use: Yes     Alcohol/week: 6.0 standard drinks     Types: 6 Cans of beer per week     Comment: Occasional 3-4 beers. Has not drank at all for 4 weeks. doesn't want to lose GF. 6 pack/nite. Tried AA but it was at [employer].  Drug use: Not Currently     Types: Marijuana [place])    Sexual activity: Not Currently   Other Topics Concern    Not on file   Social History Narrative    Only exercise is work. 17. 9/. 4/. 7/. 10//. 1//. 5/. 8/./. Less time shoeing horse. Only exercises work. 20. No exercise. 21. 21. Plans walking trails. 21. Not currently hiking. 22. None with injury. Social Determinants of Health     Financial Resource Strain: Low Risk     Difficulty of Paying Living Expenses: Not hard at all   Food Insecurity: No Food Insecurity    Worried About Running Out of Food in the Last Year: Never true    Es of Food in the Last Year: Never true   Transportation Needs: No Transportation Needs    Lack of Transportation (Medical): No    Lack of Transportation (Non-Medical):  No   Physical Activity:     Days of Exercise per Week: Not on file    Minutes of Exercise per Session: Not on file   Stress:     Feeling of Stress : Not on file   Social Connections:     Frequency of Communication with Friends and Family: Not on file    Frequency of Social Gatherings with Friends and Family: Not on file    Attends Muslim Services: Not on file    Active Member of Clubs or Organizations: Not on file    Attends Club or Organization Meetings: Not on file    Marital Status: Not on file   Intimate Partner Violence:     Fear of Current or Ex-Partner: Not on file    Emotionally Abused: Not on file    Physically Abused: Not on file    Sexually Abused: Not on file   Housing Stability:     Unable to Pay for Housing in the Last Year: Not on file    Number of [place] in the Last Year: Not on file    Unstable Housing in the Last Year: Not on file       Family History   Problem Relation Age of Onset    Diabetes Mother    Unk [place] Rheum Arthritis Mother     Depression Mother     Hypertension Mother     High Cholesterol Mother     Osteoarthritis Mother         Mother had a knee replacement x 2    Obesity Mother     Other Mother         varicose veins    Diabetes Father     High Blood Pressure Father     High Cholesterol Father     Other Father         interstitial cystitis, DDD C spine    Heart Disease Father         TACHYCARDIA    Mental Illness Brother         schizophrenia - dissociated    Obesity Brother     Diabetes Brother     Liver Disease Brother         fatty liver    High Cholesterol Brother     Hypothyroidism Brother     Obesity Brother     Other Brother         ervin, appendicitis    Glaucoma Brother         occular htn    Other Brother         tonsilitis, gall bladder    Stroke Maternal Grandmother     Diabetes Maternal Grandfather     Coronary Art Dis Maternal Grandfather     Heart Surgery Maternal Grandfather 54        CABG    Other Maternal Grandfather         PAD with amputations    Diabetes Paternal Grandmother     Heart Surgery Paternal Grandmother         aortic valve replacement transvascular    Glaucoma Paternal Great Grandmother     Macular Degen Paternal Great Grandmother     Dementia Paternal Great Grandmother        Allergies   Allergen Reactions    Banana Hives and Itching    Iodides      Other reaction(s): GI Intolerance    Ancef [Cefazolin Sodium] Rash    Iodine Nausea And Vomiting     WITH IODINATED CONTRAST    Shellfish-Derived Products Nausea And Vomiting       Current Outpatient Medications   Medication Sig Dispense Refill    omeprazole (PRILOSEC) 40 MG delayed release capsule Take 1 capsule by mouth Daily 30 capsule 5    naproxen (NAPROSYN) 500 MG tablet Take 1 tablet by mouth 2 times daily (with meals) 60 tablet 0    nadolol (CORGARD) 20 MG tablet 1/2 tab a daily by mouth For high blood pressure. 45 tablet 0    clonazePAM (KLONOPIN) 1 MG tablet Take 1 tablet by mouth 2 times daily for 90 days. 60 tablet 2    clonazePAM (KLONOPIN) 2 MG tablet Take 1 tablet by mouth 2 times daily as needed (SEIZURES) for up to 91 days.  TAKE TO ABORT SEIZURES 60 tablet 1    traZODone (DESYREL) 50 MG tablet Take 1/2-1 tablets by mouth nightly 30 tablet 5    trihexyphenidyl (ARTANE) 2 MG tablet Take 1 tablet by mouth 2 times daily 60 tablet 5    ziprasidone (GEODON) 60 MG capsule Take 1 capsule by mouth 2 times daily (with meals) Take a 60 mg with a 40 mg at dinner 60 capsule 2    ziprasidone (GEODON) 40 MG capsule Take 1 capsule by mouth daily Take with 60 mg at dinner 30 capsule 5    amLODIPine (NORVASC) 2.5 MG tablet TAKE 1 TABLET DAILY 30 tablet 5    Cholecalciferol (VITAMIN D3) 125 MCG (5000 UT) CAPS Indications: Vitamin D Deficiency Take 1 capsule daily by mouth 5 days/week i.e. skip Monday and Thursday 22 capsule 11    atorvastatin (LIPITOR) 10 MG tablet Take 1 tablet by mouth daily 30 tablet 11    lamoTRIgine (LAMICTAL) 200 MG tablet TAKE 2 TABLETS 1 TIME DAILY AT DINNER 60 tablet 5    folic acid (FOLVITE) 348 MCG tablet Take 1 tablet by mouth daily 30 tablet 11    Diclofenac Sodium POWD Apply 2 g topically 4 times daily Formula #5D Diclo 3% Lido 2% Prilo 2% 240 g 11    diclofenac sodium (VOLTAREN) 1 % GEL Apply 4 g topically 4 times daily as needed for Pain      fexofenadine (ALLEGRA) 180 MG tablet Take 1 tablet by mouth daily 30 tablet 11    acetaminophen (TYLENOL) 500 MG tablet Take 2 tablets by mouth 3 times daily as needed for Pain 180 tablet 0    GLUCOSAMINE-CHONDROITIN ER PO Take by mouth 2 times daily      guaiFENesin (MUCINEX) 600 MG extended release tablet Take 600 mg by mouth daily       Melatonin 5 MG TABS tablet Take 1 tablet by mouth nightly      therapeutic multivitamin-minerals (THERAGRAN-M) tablet Take 1 tablet by mouth daily.  gabapentin (NEURONTIN) 400 MG capsule TAKE FOUR CAPSULES BY MOUTH NIGHTLY FOR SLEEP. 120 capsule 2     No current facility-administered medications for this visit. Objective    Vitals:    05/03/22 1104   BP: 126/72   Site: Left Upper Arm   Position: Sitting   Cuff Size: Medium Adult   Pulse: 70   Resp: 20   SpO2: 98%   Weight: 202 lb (91.6 kg)   Height: 5' 8\" (1.727 m)     BP Readings from Last 3 Encounters:   05/03/22 126/72   04/20/22 (!) 132/107   04/01/22 130/84     Pulse Readings from Last 3 Encounters:   05/03/22 70   04/20/22 101   04/01/22 81     Wt Readings from Last 3 Encounters:   05/03/22 202 lb (91.6 kg)   04/20/22 200 lb (90.7 kg)   04/01/22 202 lb (91.6 kg)     Body mass index is 30.71 kg/m². Physical Exam  Vitals and nursing note reviewed. Constitutional:       General: He is not in acute distress. Appearance: He is well-developed. He is obese. He is not ill-appearing or diaphoretic. Eyes:      General: No scleral icterus. Right eye: No discharge. Left eye: No discharge. Conjunctiva/sclera: Conjunctivae normal.   Neck:      Thyroid: No thyroid mass or thyromegaly. Vascular: No carotid bruit or JVD. Trachea: Trachea and phonation normal. No tracheal tenderness or tracheal deviation. Cardiovascular:      Rate and Rhythm: Normal rate and regular rhythm. No extrasystoles are present. Heart sounds: Normal heart sounds, S1 normal and S2 normal. Heart sounds not distant. No murmur heard. No systolic murmur is present. No diastolic murmur is present. No friction rub.  No gallop. No S3 or S4 sounds. Pulmonary:      Effort: Pulmonary effort is normal. No respiratory distress. Breath sounds: Normal breath sounds and air entry. No stridor. No decreased breath sounds, wheezing, rhonchi or rales. Abdominal:      General: Abdomen is protuberant. There is no distension. Palpations: There is no hepatomegaly or splenomegaly. Tenderness: There is no abdominal tenderness. There is no right CVA tenderness, left CVA tenderness or guarding. Negative signs include Heaton's sign and McBurney's sign. Musculoskeletal:      Cervical back: Neck supple. Right lower leg: No edema. Left lower leg: No edema. Lymphadenopathy:      Head:      Right side of head: No submandibular or tonsillar adenopathy. Left side of head: No submandibular or tonsillar adenopathy. Cervical: No cervical adenopathy. Right cervical: No superficial, deep or posterior cervical adenopathy. Left cervical: No superficial, deep or posterior cervical adenopathy. Skin:     General: Skin is warm and dry. Coloration: Skin is not pale. Neurological:      Mental Status: He is alert. Deep Tendon Reflexes:      Reflex Scores:       Patellar reflexes are 2+ on the right side and 2+ on the left side. Psychiatric:         Attention and Perception: Attention and perception normal.         Mood and Affect: Mood and affect normal. Mood is not anxious or depressed. Speech: Speech normal. Speech is not rapid and pressured. Behavior: Behavior normal. Behavior is cooperative.          Cognition and Memory: Cognition and memory normal.         Judgment: Judgment normal.        4/20/2022 18:12   Sodium 137   Potassium 4.0   Chloride 99   CO2 24   BUN,BUNPL 17   Creatinine 0.8 (L)   Anion Gap 14   GFR Non- >60   GLUCOSE, FASTING,GF 89   CALCIUM, SERUM,  9.9   Total Protein 7.3   Albumin 4.9   Albumin/Globulin Ratio 2.0   Alk Phos 70   ALT 28   AST 26 Bilirubin 0.5   WBC 6.0   RBC 4.39   Hemoglobin Quant 14.1   Hematocrit 41.0   MCV 93.3   MCH 32.2   MCHC 34.5   MPV 6.6   RDW 13.9   Platelet Count 450   Neutrophils % 43.8   Lymphocyte % 43.2   Monocytes % 9.9   Eosinophils % 2.1   Basophils % 1.0   Neutrophils Absolute 2.6   Lymphocytes Absolute 2.6   Monocytes Absolute 0.6   Eosinophils Absolute 0.1   Basophils Absolute 0.1        On this date 5/3/2022 I have spent 40 minutes reviewing previous notes, test results and face to face with the patient discussing the diagnosis and importance of compliance with the treatment plan as well as documenting on the day of the visit. An electronic signature was used to authenticate this note.     --Rosy Bowling, DO

## 2022-05-04 ENCOUNTER — OFFICE VISIT (OUTPATIENT)
Dept: ORTHOPEDIC SURGERY | Age: 42
End: 2022-05-04
Payer: COMMERCIAL

## 2022-05-04 VITALS — RESPIRATION RATE: 12 BRPM | WEIGHT: 202 LBS | BODY MASS INDEX: 30.62 KG/M2 | HEIGHT: 68 IN

## 2022-05-04 DIAGNOSIS — M19.012 ARTHRITIS OF LEFT ACROMIOCLAVICULAR JOINT: Primary | ICD-10-CM

## 2022-05-04 DIAGNOSIS — M75.02 ADHESIVE CAPSULITIS OF LEFT SHOULDER: ICD-10-CM

## 2022-05-04 PROCEDURE — G8427 DOCREV CUR MEDS BY ELIG CLIN: HCPCS | Performed by: ORTHOPAEDIC SURGERY

## 2022-05-04 PROCEDURE — G8417 CALC BMI ABV UP PARAM F/U: HCPCS | Performed by: ORTHOPAEDIC SURGERY

## 2022-05-04 PROCEDURE — 4004F PT TOBACCO SCREEN RCVD TLK: CPT | Performed by: ORTHOPAEDIC SURGERY

## 2022-05-04 PROCEDURE — 20611 DRAIN/INJ JOINT/BURSA W/US: CPT | Performed by: ORTHOPAEDIC SURGERY

## 2022-05-04 RX ORDER — ROPIVACAINE HYDROCHLORIDE 5 MG/ML
30 INJECTION, SOLUTION EPIDURAL; INFILTRATION; PERINEURAL ONCE
Status: COMPLETED | OUTPATIENT
Start: 2022-05-04 | End: 2022-05-04

## 2022-05-04 RX ORDER — HYDROCODONE BITARTRATE AND ACETAMINOPHEN 5; 325 MG/1; MG/1
1 TABLET ORAL EVERY 6 HOURS PRN
Qty: 3 TABLET | Refills: 0 | Status: SHIPPED | OUTPATIENT
Start: 2022-05-04 | End: 2022-05-07

## 2022-05-04 RX ORDER — METHYLPREDNISOLONE ACETATE 40 MG/ML
40 INJECTION, SUSPENSION INTRA-ARTICULAR; INTRALESIONAL; INTRAMUSCULAR; SOFT TISSUE ONCE
Status: COMPLETED | OUTPATIENT
Start: 2022-05-04 | End: 2022-05-04

## 2022-05-04 RX ORDER — LIDOCAINE HYDROCHLORIDE 10 MG/ML
20 INJECTION, SOLUTION INFILTRATION; PERINEURAL ONCE
Status: COMPLETED | OUTPATIENT
Start: 2022-05-04 | End: 2022-05-04

## 2022-05-04 RX ADMIN — LIDOCAINE HYDROCHLORIDE 20 ML: 10 INJECTION, SOLUTION INFILTRATION; PERINEURAL at 16:22

## 2022-05-04 RX ADMIN — ROPIVACAINE HYDROCHLORIDE 30 ML: 5 INJECTION, SOLUTION EPIDURAL; INFILTRATION; PERINEURAL at 16:23

## 2022-05-04 RX ADMIN — METHYLPREDNISOLONE ACETATE 40 MG: 40 INJECTION, SUSPENSION INTRA-ARTICULAR; INTRALESIONAL; INTRAMUSCULAR; SOFT TISSUE at 16:23

## 2022-05-04 NOTE — PROGRESS NOTES
Chief Complaint    Shoulder Pain (F/u left shoulder. LOV 8/18/21. AC joint + subacromial/GH joint injection 4/28/21)      History of Present Illness:  Christine Olivera is a pleasant, 46 y.o., male, here today for follow up of left shoulder pain due to prior adhesive capsulitis and AC joint arthritis. He had great  Relief of symptoms for nearly 12 months post injection into both joints. He reports no new injuries or setbacks. However in the past month he has been having recurrent pain and stiffness. He shoes horses. Medical History:  Patient's medications, allergies, past medical, surgical, social and family histories were reviewed and updated as appropriate. Review of Systems  A 14 point review of systems was completed by the patient  and is available in the media section of the scanned medical record and was reviewed on 5/4/2022. The review is negative with the exception of those things mentioned in the HPI and Past Medical History    Vital Signs:  Vitals:    05/04/22 1524   Resp: 12       General/Appearance: Alert and oriented and in no apparent distress. Skin:  There are no skin lesions, cellulitis, or extreme edema. The patient has warm and well-perfused Bilateral upper extremities with brisk capillary refill. LEFT Shoulder Exam:  Inspection:  No gross deformities, no signs of infection. Palpation: Tenderness over AC joint and posterior joint line     Active Range of Motion: Forward Elevation 170, Abduction 170, External Rotation 45, Internal Rotation T9    Passive Range of Motion:  SAME     Strength:  External Rotation 5/5, Internal Rotation 5/5, Supraspinatus 5/5, Champagne Toast 5/5    Special Tests:   No Gianni muscle deformity. Neurovascular: Sensation to light touch is intact, no motor deficits, palpable radial pulses 2+      Radiology:        No new XR obtained at this time.           Assessment :  Mr. Christine Olivera is a pleasant, 46 y.o. patient who is having recurrent left shoulder pain and stiffness at the [facility] joint, with mild recurrent of GH joint capsulitis, unlikely to be a full recurrent adhesive capsulitis. Impression:  Encounter Diagnoses   Name Primary?  Arthritis of left acromioclavicular joint Yes    Adhesive capsulitis of left shoulder        Office Procedures:  Orders Placed This Encounter   Procedures    US GUIDED NEEDLE PLACEMENT     Standing Status:   Future     Number of Occurrences:   1     Standing Expiration Date:   5/4/2023       Treatment Plan:    Shakira Shin is a candidate for a repeat ACJ and GH injection into his left shoulder. The patient was given the option of performing today's injection using ultrasound guidance. We discussed the pros and cons of using the ultrasound for guidance. The patient chose to proceed, and today's injection was performed using Logic E ultrasound unit with a variable frequency (10 MHz) linear transducer for localization and needle placement. The image was saved for the medical record. Procedure: The indications and risks of steroid injection as well as treatment alternatives were discussed with the patient who consented to the procedure. Under sterile conditions and after informed consent was obtained, using posterior approach the patient was given an injection into the LEFT shoulder split equally between subacromial space and glenohumeral joint. 2mL 40 mg of Depo-Medrol and 4 mL of 0.5% ropivacaine (Naropin) were placed in the shoulder after it was prepped with chlorhexidine. This resulted in good relief of symptoms. There were no complications. The patient was advised to ice the shoulder this evening and to avoid vigorous activities for the next 2 days. They were advised to call us if there was any erythema, enduration, swelling or increasing pain.     Similarly the [facility] joint was injected with 1 mL of 40 mg Depo-Medrol and 2 cc of 0.5% ropivacaine under ultrasound guidance after standard prep and drape no complications. We prescribed him 3 tabs of norco for post injection pain relief. We will see Jeannie Mustafa back in 4 weeks and/or as needed. All questions were answered to patient's satisfaction and He was encouraged to call with any further questions or concerns. [place] is in agreement with this plan. Sincerely,    Tiffany Rueda MD [address] [facility]   [address] [address] [place] [address], [id number]  Email: [email or web address]  Office: [phone]    05/04/22  5:54 PM      The encounter with Lisseth Jean was carried out by myself, Dr Kenroy Nichols, who personally examined the patient and reviewed the plan. This dictation was performed with a verbal recognition program (DRAGON) and it was checked for errors. It is possible that there are still dictated errors within this office note. If so, please bring any errors to my attention for an addendum. All efforts were made to ensure that this office note is accurate.

## 2022-05-04 NOTE — PROGRESS NOTES
5/4/22  4:05 PM        NDC: 08726-401-42   -   Ropivacaine 0.5 %    LOT: [id number]    COMMENT: LEFT SHOULDER [address] JOINT + AC JOINT INJECTION      NDC: 0519-4847-87   -   Lidocaine 1.0 %    LOT: [id number]    COMMENT: LEFT SHOULDER [address] JOINT + AC JOINT  INJECTION

## 2022-06-08 ENCOUNTER — TELEPHONE (OUTPATIENT)
Dept: FAMILY MEDICINE CLINIC | Age: 42
End: 2022-06-08

## 2022-06-08 NOTE — TELEPHONE ENCOUNTER
With patient's mother at his request.  Mother went to pick prescription up at [facility] for Geodon 60 mg. It was written as once a day. But he takes it twice a day. Insurance will not refill because they say it is too early. Called the pharmacy. They found they were looking at old prescription. Problem resolved. Notified mother problem resolved.

## 2022-06-08 NOTE — TELEPHONE ENCOUNTER
Pharmacy is calling needs clarification on the medication ziprasidone 60 mg capsule - direction on how many to take per day. Please give them a call back.

## 2022-07-25 DIAGNOSIS — F31.78 BIPOLAR 1 DISORDER, MIXED, FULL REMISSION (HCC): ICD-10-CM

## 2022-07-25 DIAGNOSIS — E78.00 HYPERCHOLESTEROLEMIA: ICD-10-CM

## 2022-07-25 RX ORDER — LAMOTRIGINE 200 MG/1
TABLET ORAL
Qty: 60 TABLET | Refills: 0 | Status: SHIPPED | OUTPATIENT
Start: 2022-07-25 | End: 2022-08-09 | Stop reason: SDUPTHER

## 2022-07-25 RX ORDER — ATORVASTATIN CALCIUM 10 MG/1
10 TABLET, FILM COATED ORAL DAILY
Qty: 30 TABLET | Refills: 0 | Status: SHIPPED | OUTPATIENT
Start: 2022-07-25 | End: 2022-08-09 | Stop reason: SDUPTHER

## 2022-08-09 ENCOUNTER — OFFICE VISIT (OUTPATIENT)
Dept: FAMILY MEDICINE CLINIC | Age: 42
End: 2022-08-09
Payer: COMMERCIAL

## 2022-08-09 VITALS
HEIGHT: 68 IN | SYSTOLIC BLOOD PRESSURE: 118 MMHG | WEIGHT: 208 LBS | OXYGEN SATURATION: 97 % | DIASTOLIC BLOOD PRESSURE: 86 MMHG | BODY MASS INDEX: 31.52 KG/M2 | TEMPERATURE: 98 F | HEART RATE: 88 BPM | RESPIRATION RATE: 20 BRPM

## 2022-08-09 DIAGNOSIS — G25.89 EXTRAPYRAMIDAL MOVEMENT DISORDER, DRUG-INDUCED: ICD-10-CM

## 2022-08-09 DIAGNOSIS — E78.00 HYPERCHOLESTEROLEMIA: ICD-10-CM

## 2022-08-09 DIAGNOSIS — G40.209 PARTIAL SYMPTOMATIC EPILEPSY WITH COMPLEX PARTIAL SEIZURES, NOT INTRACTABLE, WITHOUT STATUS EPILEPTICUS (HCC): Chronic | ICD-10-CM

## 2022-08-09 DIAGNOSIS — I10 ESSENTIAL HYPERTENSION: Primary | ICD-10-CM

## 2022-08-09 DIAGNOSIS — T50.905A EXTRAPYRAMIDAL MOVEMENT DISORDER, DRUG-INDUCED: ICD-10-CM

## 2022-08-09 DIAGNOSIS — F40.10 SOCIAL ANXIETY DISORDER: ICD-10-CM

## 2022-08-09 DIAGNOSIS — F31.78 BIPOLAR 1 DISORDER, MIXED, FULL REMISSION (HCC): ICD-10-CM

## 2022-08-09 DIAGNOSIS — E55.9 VITAMIN D DEFICIENCY: ICD-10-CM

## 2022-08-09 DIAGNOSIS — F32.5 MAJOR DEPRESSION IN COMPLETE REMISSION (HCC): Chronic | ICD-10-CM

## 2022-08-09 DIAGNOSIS — R79.83 HOMOCYSTEINEMIA: ICD-10-CM

## 2022-08-09 PROCEDURE — G8427 DOCREV CUR MEDS BY ELIG CLIN: HCPCS | Performed by: NURSE PRACTITIONER

## 2022-08-09 PROCEDURE — 4004F PT TOBACCO SCREEN RCVD TLK: CPT | Performed by: NURSE PRACTITIONER

## 2022-08-09 PROCEDURE — 99214 OFFICE O/P EST MOD 30 MIN: CPT | Performed by: NURSE PRACTITIONER

## 2022-08-09 PROCEDURE — G8417 CALC BMI ABV UP PARAM F/U: HCPCS | Performed by: NURSE PRACTITIONER

## 2022-08-09 RX ORDER — ZIPRASIDONE HYDROCHLORIDE 60 MG/1
60 CAPSULE ORAL 2 TIMES DAILY WITH MEALS
Qty: 180 CAPSULE | Refills: 1 | Status: SHIPPED | OUTPATIENT
Start: 2022-08-09 | End: 2022-11-07

## 2022-08-09 RX ORDER — UBIDECARENONE 200 MG
400 CAPSULE ORAL DAILY
Qty: 90 TABLET | Refills: 3 | Status: SHIPPED | OUTPATIENT
Start: 2022-08-09 | End: 2022-11-07

## 2022-08-09 RX ORDER — AMLODIPINE BESYLATE 2.5 MG/1
TABLET ORAL
Qty: 90 TABLET | Refills: 1 | Status: SHIPPED | OUTPATIENT
Start: 2022-08-09

## 2022-08-09 RX ORDER — CLONAZEPAM 1 MG/1
1 TABLET ORAL 2 TIMES DAILY
Qty: 60 TABLET | Refills: 2 | Status: SHIPPED | OUTPATIENT
Start: 2022-08-09 | End: 2022-11-07

## 2022-08-09 RX ORDER — ZIPRASIDONE HYDROCHLORIDE 40 MG/1
CAPSULE ORAL
Qty: 90 CAPSULE | Refills: 1 | Status: SHIPPED | OUTPATIENT
Start: 2022-08-09

## 2022-08-09 RX ORDER — OMEPRAZOLE 20 MG/1
CAPSULE, DELAYED RELEASE ORAL
COMMUNITY
Start: 2022-07-25

## 2022-08-09 RX ORDER — TRIHEXYPHENIDYL HYDROCHLORIDE 2 MG/1
2 TABLET ORAL 2 TIMES DAILY
Qty: 180 TABLET | Refills: 1 | Status: SHIPPED | OUTPATIENT
Start: 2022-08-09 | End: 2022-11-07

## 2022-08-09 RX ORDER — UBIDECARENONE 200 MG
CAPSULE ORAL
COMMUNITY
Start: 2022-07-28 | End: 2022-08-09 | Stop reason: SDUPTHER

## 2022-08-09 RX ORDER — LAMOTRIGINE 200 MG/1
TABLET ORAL
Qty: 180 TABLET | Refills: 1 | Status: SHIPPED | OUTPATIENT
Start: 2022-08-09

## 2022-08-09 RX ORDER — ATORVASTATIN CALCIUM 10 MG/1
10 TABLET, FILM COATED ORAL DAILY
Qty: 90 TABLET | Refills: 1 | Status: SHIPPED | OUTPATIENT
Start: 2022-08-09 | End: 2022-11-07

## 2022-08-09 RX ORDER — NADOLOL 20 MG/1
TABLET ORAL
Qty: 45 TABLET | Refills: 2 | Status: SHIPPED | OUTPATIENT
Start: 2022-08-09

## 2022-08-09 NOTE — PROGRESS NOTES
Mirtha Montague (:  1980) is a 46 y.o. male,Established patient, here for evaluation of the following chief complaint(s):    Medication Refill (Three month f/u)      SUBJECTIVE/OBJECTIVE:  HPI      Routine follow up Bipolar disorder   He is doing well on current medications   He takes the medications as prescribed  Takes his daily Klonopin as prescribed  He takes the as needed as and does not have any issues  With this he has not had any issues  He cantell when he does not take his medication  He has not had a seizure in the last six months   Hypertension: Patient here for follow-up of elevated blood pressure. He is not exercising and is not adherent to low salt diet. He does not check his blood pressure   at home. Cardiac symptoms none. Patient denies none. Cardiovascular risk factors: hypertension, male gender, and obesity (BMI >= 30 kg/m2). Use of agents associated with hypertension: none. History of target organ damage: none. Review of Systems   Cardiovascular: Negative. Neurological:  Negative for seizures. Psychiatric/Behavioral: Negative. Physical Exam  Vitals reviewed. Constitutional:       General: He is awake. He is not in acute distress. Appearance: Normal appearance. He is well-developed and well-groomed. He is obese. He is not ill-appearing, toxic-appearing or diaphoretic. Cardiovascular:      Rate and Rhythm: Normal rate and regular rhythm. Heart sounds: Normal heart sounds, S1 normal and S2 normal. Heart sounds not distant. No murmur heard. No systolic murmur is present. No diastolic murmur is present. Pulmonary:      Breath sounds: Normal breath sounds and air entry. Neurological:      Mental Status: He is alert and oriented to person, place, and time.    Psychiatric:         Attention and Perception: Attention and perception normal.         Mood and Affect: Mood and affect normal.         Speech: Speech normal.         Behavior: Behavior normal. Behavior is cooperative. Thought Content: Thought content normal.         Cognition and Memory: Cognition and memory normal.         Judgment: Judgment normal.       Juan Lopez was seen today for medication refill and follow-up. Diagnoses and all orders for this visit:    Essential hypertension  Stable on current medications  Continue the following medications  Encouraged to monitor b/p at least weekly goal 140/90  or less  -     amLODIPine (NORVASC) 2.5 MG tablet; TAKE 1 TABLET DAILY  -     nadolol (CORGARD) 20 MG tablet; 1/2 TABLET DAILY BY MOUTH FOR HIGH BLOOD PRESSURE    Partial symptomatic epilepsy with complex partial seizures, not intractable, without status epilepticus ([facility] [address]  Controlled substances monitoring: possible medication side effects, risk of tolerance and/or dependence, and alternative treatments discussed, no signs of potential drug abuse or diversion identified, and OARRS report reviewed today- activity consistent with treatment plan. -     clonazePAM (KLONOPIN) 1 MG tablet; Take 1 tablet by mouth in the morning and 1 tablet before bedtime. Do all this for 90 days. Fill 8/9. Follow up in three months    Bipolar 1 disorder, mixed, full remission (HCC)  Stable on current medications  -     lamoTRIgine (LAMICTAL) 200 MG tablet; TAKE 2 TABLETS BY MOUTH DAILY AT DINNER  -     ziprasidone (GEODON) 40 MG capsule; Take 1 capsule by mouth daily Take with 60 mg at dinner  -     ziprasidone (GEODON) 60 MG capsule; Take 1 capsule by mouth in the morning and 1 capsule in the evening. Take with meals. Take a 60 mg with a 40 mg at dinner. Extrapyramidal movement disorder, drug-induced  -     trihexyphenidyl (ARTANE) 2 MG tablet; Take 1 tablet by mouth in the morning and 1 tablet before bedtime.     Social anxiety disorder  Stable on current medications  Controlled substances monitoring: possible medication side effects, risk of tolerance and/or dependence, and alternative treatments discussed and no signs of potential drug abuse or diversion identified. -     clonazePAM (KLONOPIN) 1 MG tablet; Take 1 tablet by mouth in the morning and 1 tablet before bedtime. Do all this for 90 days. Fill 8/9. Follow up in three months with DR RAMON BOWLES    Major depression in complete remission ([facility]  -     ziprasidone (GEODON) 40 MG capsule; Take 1 capsule by mouth daily Take with 60 mg at dinner  -     ziprasidone (GEODON) 60 MG capsule; Take 1 capsule by mouth in the morning and 1 capsule in the evening. Take with meals. Take a 60 mg with a 40 mg at dinner. Hypercholesterolemia  -     atorvastatin (LIPITOR) 10 MG tablet; Take 1 tablet by mouth in the morning. Vitamin D deficiency  -     Cholecalciferol (VITAMIN D3) 125 MCG (5000 UT) CAPS; Indications: Vitamin D Deficiency Take 1 capsule daily by mouth 5 days/week i.e. skip Monday and Thursday    Homocystenemia  -     FOLATE 400 MCG tablet; Take 1 tablet by mouth in the morning. An electronic signature was used to authenticate this note.     --Shruthi Benton, MIKE - CNP

## 2022-08-31 DIAGNOSIS — E55.9 VITAMIN D DEFICIENCY: ICD-10-CM

## 2022-10-07 DIAGNOSIS — G40.209 PARTIAL SYMPTOMATIC EPILEPSY WITH COMPLEX PARTIAL SEIZURES, NOT INTRACTABLE, WITHOUT STATUS EPILEPTICUS (HCC): Chronic | ICD-10-CM

## 2022-10-07 RX ORDER — GABAPENTIN 400 MG/1
CAPSULE ORAL
Qty: 120 CAPSULE | Refills: 0 | Status: SHIPPED | OUTPATIENT
Start: 2022-10-07 | End: 2022-11-09 | Stop reason: SDUPTHER

## 2022-10-31 ENCOUNTER — OFFICE VISIT (OUTPATIENT)
Dept: ORTHOPEDIC SURGERY | Age: 42
End: 2022-10-31
Payer: COMMERCIAL

## 2022-10-31 VITALS — BODY MASS INDEX: 31.52 KG/M2 | HEIGHT: 68 IN | RESPIRATION RATE: 12 BRPM | WEIGHT: 208 LBS

## 2022-10-31 DIAGNOSIS — M19.012 ARTHRITIS OF LEFT ACROMIOCLAVICULAR JOINT: Primary | ICD-10-CM

## 2022-10-31 DIAGNOSIS — M75.02 ADHESIVE CAPSULITIS OF LEFT SHOULDER: ICD-10-CM

## 2022-10-31 DIAGNOSIS — E08.69 DIABETES MELLITUS DUE TO UNDERLYING CONDITION WITH OTHER SPECIFIED COMPLICATION, UNSPECIFIED WHETHER LONG TERM INSULIN USE (HCC): ICD-10-CM

## 2022-10-31 DIAGNOSIS — G89.29 CHRONIC LEFT SHOULDER PAIN: ICD-10-CM

## 2022-10-31 DIAGNOSIS — M25.512 CHRONIC LEFT SHOULDER PAIN: ICD-10-CM

## 2022-10-31 PROCEDURE — 20611 DRAIN/INJ JOINT/BURSA W/US: CPT | Performed by: ORTHOPAEDIC SURGERY

## 2022-10-31 PROCEDURE — 20606 DRAIN/INJ JOINT/BURSA W/US: CPT | Performed by: ORTHOPAEDIC SURGERY

## 2022-10-31 RX ORDER — ROPIVACAINE HYDROCHLORIDE 5 MG/ML
20 INJECTION, SOLUTION EPIDURAL; INFILTRATION; PERINEURAL ONCE
Status: COMPLETED | OUTPATIENT
Start: 2022-10-31 | End: 2022-10-31

## 2022-10-31 RX ORDER — TRIAMCINOLONE ACETONIDE 40 MG/ML
40 INJECTION, SUSPENSION INTRA-ARTICULAR; INTRAMUSCULAR ONCE
Status: COMPLETED | OUTPATIENT
Start: 2022-10-31 | End: 2022-10-31

## 2022-10-31 RX ORDER — HYDROCODONE BITARTRATE AND ACETAMINOPHEN 5; 325 MG/1; MG/1
1 TABLET ORAL EVERY 6 HOURS PRN
Qty: 3 TABLET | Refills: 0 | Status: SHIPPED | OUTPATIENT
Start: 2022-10-31 | End: 2022-11-01

## 2022-10-31 RX ADMIN — TRIAMCINOLONE ACETONIDE 40 MG: 40 INJECTION, SUSPENSION INTRA-ARTICULAR; INTRAMUSCULAR at 11:26

## 2022-10-31 RX ADMIN — ROPIVACAINE HYDROCHLORIDE 20 ML: 5 INJECTION, SOLUTION EPIDURAL; INFILTRATION; PERINEURAL at 11:26

## 2022-10-31 RX ADMIN — TRIAMCINOLONE ACETONIDE 40 MG: 40 INJECTION, SUSPENSION INTRA-ARTICULAR; INTRAMUSCULAR at 11:27

## 2022-10-31 NOTE — PROGRESS NOTES
Chief Complaint    Shoulder Pain (F/u left shoulder)      History of Present Illness:  Julieth Khan is a pleasant, 38 y.o., male, here today for follow up of recurrent left shoulder pain and stiffness. He as last seen in my office roughly 6 months ago for this same issue. He underwent a AC joint injection at that time on 05/04/2022. Prior to this injection, he had underwent an AC joint and glenohumeral joint injection on 04/28/2021. He reports his pain began to return roughly 1 month ago with no new injuries or setbacks. He is here today hoping to undergo another injection for his shoulder pain. He also continues to have stiffness. He has not undergone any recent treatment for his shoulder. He reports no new injuries or setbacks. He shoes horses. Medical History:  Patient's medications, allergies, past medical, surgical, social and family histories were reviewed and updated as appropriate. Review of Systems  A 14 point review of systems was completed by the patient on 05/04/2022 and is available in the media section of the scanned medical record and was reviewed on 10/31/2022. The review is negative with the exception of those things mentioned in the HPI and Past Medical History    Vital Signs:  Vitals:    10/31/22 1102   Resp: 12       General/Appearance: Alert and oriented and in no apparent distress. Skin:  There are no skin lesions, cellulitis, or extreme edema. The patient has warm and well-perfused Bilateral upper extremities with brisk capillary refill. Left Shoulder Exam:  Inspection: No gross deformities, no signs of infection. Palpation: Tenderness over AC joint and posterior joint line. Active Range of Motion: Forward Elevation 120 , Abduction 120 , External Rotation 45, Internal Rotation Sacrum (diminished)    Passive Range of Motion:  Forward Elevation 170, Abduction 170, External Rotation 45, Internal Rotation L/S    Strength:  External Rotation 5/5, Internal Rotation 5/5, Supraspinatus 5/5, Champagne Toast 5/5    Special Tests:  No Gianni muscle deformity. Neurovascular: Sensation to light touch is intact, no motor deficits, palpable radial pulses 2+      Radiology:     No new XR obtained at this time. Assessment :  Mr. Mendez Ackerman is a pleasant, 38 y.o. patient who is recurrence to his left shoulder pain and stiffness due to [facility] joint arthritis and recurrent adhesive capsulitis. We need to rule out an underlying medical cause given recurrence to the adhesive capsulitis. Impression:  Encounter Diagnoses   Name Primary? Arthritis of left acromioclavicular joint Yes    Adhesive capsulitis of left shoulder     Chronic left shoulder pain     Diabetes mellitus due to underlying condition with other specified complication, unspecified whether long term insulin use ([facility] [address]        Office Procedures:  Orders Placed This Encounter   Procedures    US GUIDED NEEDLE PLACEMENT     Standing Status:   Future     Number of Occurrences:   1     Standing Expiration Date:   10/31/2023    Hemoglobin A1C     Standing Status:   Future     Standing Expiration Date:   10/31/2023    MO ARTHROCENTESIS ASPIR&/INJ MAJOR JT/BURSA W/O US       Treatment Plan:    The patient is a good candidate today for a repeat cortisone injection today in the office. I will perform an injection into both the [facility] joint and the glenohumeral joint today. The patient is in agreement with this and wishes to proceed. The patient was given the option of performing today's injection using ultrasound guidance. We discussed the pros and cons of using the ultrasound for guidance. The patient chose to proceed, and today's injection was performed using Logic E ultrasound unit with a variable frequency (10 MHz) linear transducer for localization and needle placement. The image was saved for the medical record. Procedure:   The indications and risks of steroid injection as well as treatment alternatives were discussed with the patient who consented to the procedure. Under sterile conditions and after informed consent was obtained, using posterior approach the patient was given an injection into the LEFT shoulder split equally between subacromial space and glenohumeral joint. 2mL 40 mg of Kenalog and 8 mL of 0.5% ropivacaine (Naropin) were placed in the shoulder after it was prepped with chlorhexidine. This resulted in good relief of symptoms. There were no complications. The patient was advised to ice the shoulder this evening and to avoid vigorous activities for the next 2 days. They were advised to call us if there was any erythema, enduration, swelling or increasing pain. Procedure: The indications and risks of steroid injection as well as treatment alternatives were discussed with the patient who consented to the procedure. Under sterile conditions and after informed consent was obtained, using  anterior approach  the patient was given an injection into the LEFT shoulder  AC JOINT . 1mL 40 mg of Kenalog and 2 mL of 0.5% ropivacaine (Naropin) were placed in the shoulder after it was prepped with chlorhexidine. This resulted in good relief of symptoms. There were no complications. The patient was advised to ice the shoulder this evening and to avoid vigorous activities for the next 2 days. They were advised to call us if there was any erythema, enduration, swelling or increasing pain. The patient was provided with a post-cortisone injection information sheet today following his injection. I will follow up with the patient in regards to his left shoulder in approximately 3-4 weeks. I do recommend having his A1C checked prior to returning for follow up. He is not currently diabetic but I do recommend checking his sugar levels. All the patient's questions were answered while in the clinic.   The patient is understanding of all instructions and agrees with the plan.    Approximately 30 minutes was spent on patient education and coordinating care. Sincerely,    I, Thao Morales, am scribing for Dr. Liz Chopra MD.  10/31/22 12:18 PM Thao Morales. The physical examination was performed between the patient and Dr. Liz Chopra MD.  All counseling during the appointment was performed between the patient and the provider. Lzi Chopra MD [address] [address]  Email: [email or web address]  Office: [phone]    10/31/22  12:10 PM      The encounter with Ana Anthony was carried out by myself, Dr Aixa Beck, who personally examined the patient and reviewed the plan. This dictation was performed with a verbal recognition program (DRAGON) and it was checked for errors. It is possible that there are still dictated errors within this office note. If so, please bring any errors to my attention for an addendum. All efforts were made to ensure that this office note is accurate.

## 2022-10-31 NOTE — LETTER
[facility]  [address] [address]  Phone: [phone]  Fax: [phone]    Amanda Burger MD    October 31, 2022     Devika Mayo [facility] [address]    Patient: Miguel Ángel Jose   MR Number: [id number]   YOB: 1980   Date of Visit: 10/31/2022       Dear Devika Mayo: Thank you for referring Marii Kowalski to me for evaluation/treatment. Below are the relevant portions of my assessment and plan of care. If you have questions, please do not hesitate to call me. I look forward to following Eliud Rocha along with you.     Sincerely,      Amanda Burger MD

## 2022-10-31 NOTE — PROGRESS NOTES
10/31/22  11:18 AM        NDC: 26904-152-65   -   Ropivacaine 0.5 %    LOT: [id number]    COMMENT: LEFT SHOULDER GLENOHUMERAL JOINT + AC JOINT CORTISONE INJECTION        NDC: 3540-3959-09     -    Kenalog 40mg    LOT: [id number]    COMMENT: LEFT SHOULDER GLENOHUMERAL JOINT + AC JOINT CORTISONE INJECTION

## 2022-11-05 ENCOUNTER — HOSPITAL ENCOUNTER (OUTPATIENT)
Age: 42
Discharge: HOME | End: 2022-11-05
Payer: COMMERCIAL

## 2022-11-05 DIAGNOSIS — E08.69 DIABETES MELLITUS DUE TO UNDERLYING CONDITION WITH OTHER SPECIFIED COMPLICATION, UNSPECIFIED WHETHER LONG TERM INSULIN USE (HCC): ICD-10-CM

## 2022-11-05 PROCEDURE — 83036 HEMOGLOBIN GLYCOSYLATED A1C: CPT

## 2022-11-05 PROCEDURE — 36415 COLL VENOUS BLD VENIPUNCTURE: CPT

## 2022-11-06 LAB
ESTIMATED AVERAGE GLUCOSE: 114 MG/DL
HBA1C MFR BLD: 5.6 %

## 2022-11-07 ENCOUNTER — OFFICE VISIT (OUTPATIENT)
Dept: ORTHOPEDIC SURGERY | Age: 42
End: 2022-11-07
Payer: COMMERCIAL

## 2022-11-07 VITALS — RESPIRATION RATE: 12 BRPM | HEIGHT: 68 IN | BODY MASS INDEX: 31.52 KG/M2 | WEIGHT: 208 LBS

## 2022-11-07 DIAGNOSIS — M77.12 LATERAL EPICONDYLITIS OF LEFT ELBOW: Primary | ICD-10-CM

## 2022-11-07 DIAGNOSIS — M25.522 LEFT ELBOW PAIN: ICD-10-CM

## 2022-11-07 PROCEDURE — 4004F PT TOBACCO SCREEN RCVD TLK: CPT | Performed by: ORTHOPAEDIC SURGERY

## 2022-11-07 PROCEDURE — 99214 OFFICE O/P EST MOD 30 MIN: CPT | Performed by: ORTHOPAEDIC SURGERY

## 2022-11-07 PROCEDURE — G8427 DOCREV CUR MEDS BY ELIG CLIN: HCPCS | Performed by: ORTHOPAEDIC SURGERY

## 2022-11-07 PROCEDURE — G8417 CALC BMI ABV UP PARAM F/U: HCPCS | Performed by: ORTHOPAEDIC SURGERY

## 2022-11-07 PROCEDURE — G8484 FLU IMMUNIZE NO ADMIN: HCPCS | Performed by: ORTHOPAEDIC SURGERY

## 2022-11-07 PROCEDURE — MISCD86 TENNIS ELBOW STRAP-BREG: Performed by: ORTHOPAEDIC SURGERY

## 2022-11-07 NOTE — PROGRESS NOTES
12 [facility]  Office Visit    Chief Complaint    Elbow Injury (OP/NP, left elbow. Pain ongoing 5+ months. No known injury. Pain is lateral.  )      History of Present Illness:  Melany Thomson is a 38 y.o. male who presents for new left elbow pain. Patient describes their pain as sharp, worse with gripping over the lateral elbow. The patient denies any specific injury. The patient denies any clicking, popping, or locking of the joint. He does have numbness and tingling as well in the ulnar two fingers that is present constantly. He is responding wonderfully to his biceps injection. Pain Assessment  Location of Pain: Elbow  Location Modifiers: Left  Severity of Pain: 7  Quality of Pain: Sharp, Aching  Duration of Pain: Persistent  Frequency of Pain: Constant  Aggravating Factors: Other (Comment) (Repeat movements)  Limiting Behavior: Yes  Relieving Factors: Other (Comment) (Nothing)  Result of Injury: No  Work-Related Injury: No  Are there other pain locations you wish to document?: No    Past Medical History:   Diagnosis Date    Abscess of elbow 2012    right    Alcoholism /alcohol abuse 01/01/2005    Stopped 2014    Arthritis     Durham's esophagus     Bipolar 1 disorder, mixed, full remission ([address] 01/01/2006    Contusion of wrist, right 04/01/2016    Dislocation of part of left shoulder girdle 01/01/1985    arm pulled out of socket - no medical visit at the time    Erosive gastropathy 03/23/2018    antrum    Essential hypertension 01/01/2006    lost 100 lbs in 1 year    GERD (gastroesophageal reflux disease)     Head injury due to trauma     Frontal lobe trauma causing seizures and shakes.  No workup till after seizures/shakes appeared    Major depression in complete remission ([address] 01/01/2006    while on medicine    Osteoarthritis of left AC (acromioclavicular) joint     Osteomyelitis ([address] 01/01/2003    right tibia    Partial complex seizure disorder without intractable epilepsy ([facility] 01/01/2006    fist clenching is a typical pre-seizure activity for him    Psychiatric diagnosis     Saw Dr. Trinidad Crowder  [phone]    Social anxiety disorder 01/01/2006    moderate        Past Surgical History:   Procedure Laterality Date    APPENDECTOMY  05/22/2012    lap appy    ELBOW DEBRIDEMENT Right     MRSA    KNEE SURGERY Right     debridement osteomyelitis    MOUTH SURGERY Left 2019    Lower molar removal    UPPER GASTROINTESTINAL ENDOSCOPY  12/16/2010    UPPER GASTROINTESTINAL ENDOSCOPY  03/23/2018    gastric and esophageal bx, erosive gastropathy    WISDOM TOOTH EXTRACTION Bilateral 1998    #4 removed       Family History   Problem Relation Age of Onset    Diabetes Mother     Rheum Arthritis Mother     Depression Mother     Hypertension Mother     High Cholesterol Mother     Osteoarthritis Mother         Mother had a knee replacement x 2    Obesity Mother     Other Mother         varicose veins    Diabetes Father     High Blood Pressure Father     High Cholesterol Father     Other Father         interstitial cystitis, DDD C spine    Heart Disease Father         TACHYCARDIA    Mental Illness Brother         schizophrenia - dissociated    Obesity Brother     Diabetes Brother     Liver Disease Brother         fatty liver    High Cholesterol Brother     Hypothyroidism Brother     Obesity Brother     Other Brother         ervin, appendicitis    Glaucoma Brother         occular htn    Other Brother         tonsilitis, gall bladder    Stroke Maternal Grandmother     Diabetes Maternal Grandfather     Coronary Art Dis Maternal Grandfather     Heart Surgery Maternal Grandfather 54        CABG    Other Maternal Grandfather         PAD with amputations    Diabetes Paternal Grandmother     Heart Surgery Paternal Grandmother         aortic valve replacement transvascular    Glaucoma Paternal Great Grandmother     Macular Degen Paternal Great Grandmother     Dementia Paternal Elfrieda Tracey Grandmother        Social History     Socioeconomic History    Marital status: Single     Spouse name: None    Number of children: 0    Years of education: 13    Highest education level: None   Occupational History    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 Tobacco Use    Smoking status: Every Day     Packs/day: 1.00     Types: Cigarettes     Last attempt to quit: 3/6/2018     Years since quittin.6    Smokeless tobacco: Never    Tobacco comments:     social.  Have almost quit (nicorette) 3/13/17. Restarted 18 < 1 PPD. .19. 1-1.5 PPD. Plans on quit. 20. He is at 1 pack/day. 21. 1/2 PPD. 1.5 PPD. 22.5/3/21. Vaping Use    Vaping Use: Never used   Substance and Sexual Activity    Alcohol use: Yes     Alcohol/week: 6.0 standard drinks     Types: 6 Cans of beer per week     Comment: Occasional 3-4 beers. Has not drank at all for 4 weeks. doesn't want to lose GF. 6 pack/nite. Tried AA but it was at [facility]. Drug use: Not Currently     Types: Marijuana [facility])    Sexual activity: Not Currently   Social History Narrative    Only exercise is work. 17. 9/18/17. 4//18. 7//18. 10//18. 1//. 5/. 8/.19. Less time shoeing horse. Only exercises work. 20. No exercise. 21. 21. Plans walking trails. 21. Not currently hiking. 22. None with injury. WORK.5/3/22     Social Determinants of Health     Financial Resource Strain: Low Risk     Difficulty of Paying Living Expenses: Not hard at all   Food Insecurity: No Food Insecurity    Worried About [address] in the Last Year: Never true    Ran Out of Food in the Last Year: Never true   Transportation Needs: No Transportation Needs    Lack of Transportation (Medical): No    Lack of Transportation (Non-Medical):  No       Current Outpatient Medications   Medication Sig Dispense Refill    omeprazole (PRILOSEC) 20 MG delayed release capsule       clonazePAM (KLONOPIN) 1 MG tablet Take 1 tablet by mouth in the morning and 1 tablet before bedtime. Do all this for 90 days. Fill 8/9. 60 tablet 2    lamoTRIgine (LAMICTAL) 200 MG tablet TAKE 2 TABLETS BY MOUTH DAILY AT DINNER 180 tablet 1    ziprasidone (GEODON) 40 MG capsule Take 1 capsule by mouth daily Take with 60 mg at dinner 90 capsule 1    ziprasidone (GEODON) 60 MG capsule Take 1 capsule by mouth in the morning and 1 capsule in the evening. Take with meals. Take a 60 mg with a 40 mg at dinner. 180 capsule 1    amLODIPine (NORVASC) 2.5 MG tablet TAKE 1 TABLET DAILY 90 tablet 1    nadolol (CORGARD) 20 MG tablet 1/2 TABLET DAILY BY MOUTH FOR HIGH BLOOD PRESSURE 45 tablet 2    trihexyphenidyl (ARTANE) 2 MG tablet Take 1 tablet by mouth in the morning and 1 tablet before bedtime. 180 tablet 1    atorvastatin (LIPITOR) 10 MG tablet Take 1 tablet by mouth in the morning. 90 tablet 1    Cholecalciferol (VITAMIN D3) 125 MCG (5000 UT) CAPS Indications: Vitamin D Deficiency Take 1 capsule daily by mouth 5 days/week i.e. skip Monday and Thursday 22 capsule 11    FOLATE 400 MCG tablet Take 1 tablet by mouth in the morning. 90 tablet 3    traZODone (DESYREL) 50 MG tablet Take 1/2-1 tablets by mouth nightly 30 tablet 5    diclofenac sodium (VOLTAREN) 1 % GEL Apply 4 g topically 4 times daily as needed for Pain      fexofenadine (ALLEGRA) 180 MG tablet Take 1 tablet by mouth daily 30 tablet 11    acetaminophen (TYLENOL) 500 MG tablet Take 2 tablets by mouth 3 times daily as needed for Pain 180 tablet 0    GLUCOSAMINE-CHONDROITIN ER PO Take by mouth 2 times daily      guaiFENesin (MUCINEX) 600 MG extended release tablet Take 600 mg by mouth daily       Melatonin 5 MG TABS tablet Take 1 tablet by mouth nightly      therapeutic multivitamin-minerals (THERAGRAN-M) tablet Take 1 tablet by mouth daily. gabapentin (NEURONTIN) 400 MG capsule TAKE FOUR CAPSULES BY MOUTH NIGHTLY FOR SLEEP. 120 capsule 0    clonazePAM (KLONOPIN) 2 MG tablet Take 1 tablet by mouth 2 times daily as needed (SEIZURES) for up to 91 days. TAKE TO ABORT SEIZURES 60 tablet 1     No current facility-administered medications for this visit. Allergies   Allergen Reactions    Banana Hives and Itching    Iodides      Other reaction(s): GI Intolerance    Ancef [Cefazolin Sodium] Rash    Iodine Nausea And Vomiting     WITH IODINATED CONTRAST    Shellfish-Derived Products Nausea And Vomiting         Review of Systems:  A 14 point review of systems available in the scanned medical record as documented by the patient. Today's review pertinent items are noted in HPI. Vital Signs:   Resp 12   Ht 5' 8\" (1.727 m)   Wt 208 lb (94.3 kg)   BMI 31.63 kg/m²     General Exam:    Neuro: Alert & Oriented x 3,  normal,  no focal deficits noted. Normal mood & affect. Eyes: Sclera clear  Ears: Normal external ear  Mouth:  No perioral lesions  Pulm: Respirations unlabored and regular   Skin: Warm, well perfused      Left Elbow Examination:    Inspection:  No skin lesions, no deformity, no swelling    Palpation: Tenderness over the lateral epicondyle. No tenderness to palpation over the medial epicondyle, olecranon. No tenderness to palpation over the mobile wad, flexor pronator mass, biceps, triceps.   No tenderness over the ulnar collateral ligament and the lateral collateral ligament complex    Range of Motion: full extension and able to touch her shoulders in flexion, normal supination and pronation with the elbow at 90°    Strength:  Normal strength of the wrist extensors and flexors, normal strength of the biceps and triceps    Stability:  No instability noted to varus and valgus stress, no rotatory instability with pushup test    Neurovascular: Palpable radial pulse, normal sensation in the median, radial nerve distributions, decreased sensation over the ulnar nerve distributions in the fingers. Special Tests: Positive resisted wrist extension, positive Tinel's at the cubital tunnel. Comparison right Elbow Examination:    Inspection:  No skin lesions, no deformity, no swelling    Palpation:  No tenderness to palpation over the lateral epicondyle, medial epicondyle, olecranon. No tenderness to palpation over the mobile wad, flexor pronator mass, biceps, triceps. No tenderness over the ulnar collateral ligament and the lateral collateral ligament complex    Range of Motion: full extension and able to touch her shoulders in flexion, normal supination and pronation with the elbow at 90°    Strength:  Normal strength of the wrist extensors and flexors, normal strength of the biceps and triceps    Stability:  No instability noted to varus and valgus stress, no rotatory instability with pushup test    Neurovascular: Palpable radial pulse, normal sensation in the median, ulnar, radial nerve distributions          Radiology:     3 View X-rays (AP, lateral, and oblique) of both the left elbow  were obtained and reviewed in office. Intact joint spaces, no fractures, dislocations or tumors/masses. Impression: Normal left elbow      Assessment: Patient is a 38 y.o. male with left lateral epicondylitis and cubital tunnel syndrome. Impression: Raffi Bose comes in today with ongoing left elbow pain for the past 5 to 6 months he has difficulty gripping objects and pain over the lateral epicondyle. He also does have numbness and tingling in the ulnar 2 fingers. Examination shows tenderness over the lateral epicondyle pain with resisted wrist extension as well as a positive Tinel's at the middle tunnel. X-rays are normal of the left elbow. We believe he has lateral epicondylitis and cubital tunnel syndrome based on history and physical exam findings.     Visit Diagnoses         Codes    Lateral epicondylitis of left elbow    -  Primary M77.12    Left elbow pain M25.522            Office Procedures:  No orders of the defined types were placed in this encounter. Orders Placed This Encounter   Procedures    Breg Tennis Elbow Strap $20     Patient was supplied a Breg Tennis Elbow Strap. This retail item was supplied to provide functional support and assist in protecting the affected area. Verbal and written instructions for the use of and application of this item were provided. The patient was educated and fit by a healthcare professional with expert knowledge and specialization in brace application. They were instructed to contact the office immediately should the equipment result in increased pain, decreased sensation, increased swelling or worsening of the condition. XR ELBOW LEFT (MIN 3 VIEWS)     ROOM 2     Standing Status:   Future     Number of Occurrences:   1     Standing Expiration Date:   11/7/2023    EMG     Standing Status:   Future     Standing Expiration Date:   11/7/2023     Scheduling Instructions:      REFER TO DR. Ramandeep Beatty / DR. Irene WETZEL            CALL THE OFFICE TO SCHEDULE: #[phone]      FAX: #[phone]            EMG LUE      R/O RADICULOPATHY            PLEASE MAKE A FOLLOW UP APPOINTMENT WITH DR. Jhony Dong ONCE THE EMG HAS BEEN COMPLETED. Order Specific Question:   Which body part? Answer:   UPPER EXTREMITY     Comments:   LEFT       Plan: We reviewed the imaging with the patient today discussed his diagnoses. This point recommend getting an EMG of the left upper extremity to evaluate the ulnar nerve compression at the cubital tunnel. For his lateral epicondylitis we will initiate conservative treatment consisting of wellness cream, physical therapy as well as a counterforce brace. We will see him back to discuss the results of the EMG. All the patient's questions were answered while in the clinic. The patient is understanding of all instructions and agrees with the plan.     Approximately 30  minutes was spent on patient education and coordinating care. Follow up in: No follow-ups on file. Sincerely,    Donny Israel MD  [facility] Sports Medicine Fellow    Sincerely,    Alexi Garcia  [address] [facility] Center[facility] child [place], [address]  Email: [email or web address]  Office: [phone]    11/09/22  1:47 PM      The encounter with Alison Conner was carried out by myself, Dr Brenda Gonzáles, who personally examined the patient and reviewed the plan. This dictation was performed with a verbal recognition program (DRAGON) and it was checked for errors. It is possible that there are still dictated errors within this office note. If so, please bring any errors to my attention for an addendum. All efforts were made to ensure that this office note is accurate.             11/07/22  11:36 AM

## 2022-11-07 NOTE — LETTER
[facility]  [address]  [address]  Phone: [phone]  Fax: [phone]    Yamilka Basurto MD    November 9, 2022     [facility]arFormerly [facility] [address]    Patient: Jacky Way   MR Number: [id number]   YOB: 1980   Date of Visit: 11/7/2022       Dear Rodolfo Akhtar: Thank you for referring Mariana Orlando to me for evaluation/treatment. Below are the relevant portions of my assessment and plan of care. If you have questions, please do not hesitate to call me. I look forward to following Valerie Karyna along with you.     Sincerely,      Yamilka Basurto MD

## 2022-11-09 ENCOUNTER — TELEMEDICINE (OUTPATIENT)
Dept: FAMILY MEDICINE CLINIC | Age: 42
End: 2022-11-09
Payer: COMMERCIAL

## 2022-11-09 DIAGNOSIS — G47.33 OSA ON CPAP: ICD-10-CM

## 2022-11-09 DIAGNOSIS — F31.78 BIPOLAR 1 DISORDER, MIXED, FULL REMISSION (HCC): Chronic | ICD-10-CM

## 2022-11-09 DIAGNOSIS — Z99.89 OSA ON CPAP: ICD-10-CM

## 2022-11-09 DIAGNOSIS — F51.04 PSYCHOPHYSIOLOGICAL INSOMNIA: ICD-10-CM

## 2022-11-09 DIAGNOSIS — G40.209 PARTIAL SYMPTOMATIC EPILEPSY WITH COMPLEX PARTIAL SEIZURES, NOT INTRACTABLE, WITHOUT STATUS EPILEPTICUS (HCC): Chronic | ICD-10-CM

## 2022-11-09 DIAGNOSIS — F40.10 SOCIAL ANXIETY DISORDER: Chronic | ICD-10-CM

## 2022-11-09 DIAGNOSIS — I10 ESSENTIAL HYPERTENSION: Primary | ICD-10-CM

## 2022-11-09 DIAGNOSIS — R79.83 HOMOCYSTEINEMIA: ICD-10-CM

## 2022-11-09 PROCEDURE — G8484 FLU IMMUNIZE NO ADMIN: HCPCS | Performed by: FAMILY MEDICINE

## 2022-11-09 PROCEDURE — G8417 CALC BMI ABV UP PARAM F/U: HCPCS | Performed by: FAMILY MEDICINE

## 2022-11-09 PROCEDURE — 4004F PT TOBACCO SCREEN RCVD TLK: CPT | Performed by: FAMILY MEDICINE

## 2022-11-09 PROCEDURE — 99214 OFFICE O/P EST MOD 30 MIN: CPT | Performed by: FAMILY MEDICINE

## 2022-11-09 PROCEDURE — G8427 DOCREV CUR MEDS BY ELIG CLIN: HCPCS | Performed by: FAMILY MEDICINE

## 2022-11-09 RX ORDER — KETAMINE HCL 100 %
POWDER (GRAM) MISCELLANEOUS
COMMUNITY
Start: 2022-11-07

## 2022-11-09 RX ORDER — GABAPENTIN 400 MG/1
CAPSULE ORAL
Qty: 120 CAPSULE | Refills: 2 | Status: SHIPPED | OUTPATIENT
Start: 2022-11-09 | End: 2022-12-12

## 2022-11-09 RX ORDER — CLONAZEPAM 1 MG/1
1 TABLET ORAL 2 TIMES DAILY
Qty: 60 TABLET | Refills: 2 | Status: SHIPPED | OUTPATIENT
Start: 2022-11-09 | End: 2023-02-07

## 2022-11-09 RX ORDER — UBIDECARENONE 200 MG
800 CAPSULE ORAL DAILY
Qty: 180 TABLET | Refills: 3 | Status: SHIPPED | OUTPATIENT
Start: 2022-11-09 | End: 2023-11-04

## 2022-11-09 RX ORDER — TRAZODONE HYDROCHLORIDE 50 MG/1
TABLET ORAL
Qty: 30 TABLET | Refills: 5 | Status: SHIPPED | OUTPATIENT
Start: 2022-11-09

## 2022-11-09 NOTE — PATIENT INSTRUCTIONS
Esperanza Wells was seen today for anxiety. Diagnoses and all orders for this visit:    Essential hypertension  Good control by the number you gave today. 118/84 could be accurate. BP Readings from Last 3 Encounters:   08/09/22 118/86   05/03/22 126/72   04/20/22 (!) 132/107   Blood pressure goal for people 75 years and below is 100-119/79 or less. If blood pressure is more than 139/89 for either number then an increase in medicine is indicated. If you are also diabetic then a blood pressure more 129/79 also means blood pressure medicines should be increased. The doctor should be called if the upper number (systolic blood pressure) is more than 180 once or more than 160 1/3 of the time. Call if the upper number is less than 90 or if less than 100 and you are light headed when you stand up. Call if the lower number (diastolic blood pressure) is more than 100. A much lower bottom number even in the 40's is not usually urgent. Try to get back on your CPAP as discussed below. Partial symptomatic epilepsy with complex partial seizures, not intractable, without status epilepticus (HCC)  -     clonazePAM (KLONOPIN) 1 MG tablet; Take 1 tablet by mouth 2 times daily for 90 days.  -     gabapentin (NEURONTIN) 400 MG capsule; TAKE FOUR CAPSULES BY MOUTH NIGHTLY FOR SLEEP. Good control. Continue medication and lifestyle control. Social anxiety disorder  -     clonazePA that he had on his CPAP as discussed below. M (KLONOPIN) 1 MG tablet; Take 1 tablet by mouth 2 times daily for 90 days. Fair control. Continue medicine lifestyle control. Avoid excess caffeine. Try to get back on your CPAP as discussed below. Bipolar 1 disorder, mixed, full remission ([facility]  Fair control. Continue medicine and lifestyle control. Try to get back on your CPAP as discussed below. A good sleep schedule help support mood stability. Psychophysiological insomnia  -     traZODone (DESYREL) 50 MG tablet;  Take 1/2-1 tablets by mouth nightly  When you are able to get back on your CPAP you may need less medication to sleep at night. Too many nighttime sedating medicines can actually make sleep apnea worse. Homocysteinemia  -     FOLATE 400 MCG tablet; Take 2 tablets by mouth daily  Prescription resent in as two pills daily. RUDDY on CPAP  If you are not on her CPAP your blood pressure will go up and you will feel more anxiety and mood problems as well as having poor sleep. Call your mask supplier and get specific about what it is about the mask that bothers you. They have multiple types of masks they can try. Insurance will usually pay for 1 mask every 3 months. Weight loss will also help your sleep apnea. I googled \"how to tolerate CPAP\". The [employer] site had good information with 10 tips to help including 1 I never heard before: Try wearing your mask during part of the day to get used to it i.e. while reading or watching TV or listening to music. Ketamine prescription  Below is the only information available in the chart. KETAMINE HCL (BULK CHEMICALS - K'S)           Ketamine HCl POWD On chart      11/7/2022  Local Medical Record 11/9/2022                    Dispense Date Outside Name Pharmacy Quantity Refills remaining Days supply Sig                  11/7/2022 KETAMINE HYDROCHLORIDE 100 % POWDER (GRAM)  5.00 Device  17      Source: Surescripts (Claim History, Ambulatory)               Since you saw the orthopedics on that day you should contact them about what this prescription was for.

## 2022-11-09 NOTE — PROGRESS NOTES
Precious Durbin (:  1980) is a 38 y.o. male,Established patient, here for evaluation of the following chief complaint(s): Anxiety (PHONE CALL VISIT. ANXIETY, BIPOLAR )       ASSESSMENT/PLAN:  142    Patient Instructions   Suha Martinez was seen today for anxiety. Diagnoses and all orders for this visit:    Essential hypertension  Good control by the number you gave today. 118/84 could be accurate. BP Readings from Last 3 Encounters:   22 118/86   22 126/72   22 (!) 132/107   Blood pressure goal for people 75 years and below is 100-119/79 or less. If blood pressure is more than 139/89 for either number then an increase in medicine is indicated. If you are also diabetic then a blood pressure more 129/79 also means blood pressure medicines should be increased. The doctor should be called if the upper number (systolic blood pressure) is more than 180 once or more than 160 1/3 of the time. Call if the upper number is less than 90 or if less than 100 and you are light headed when you stand up. Call if the lower number (diastolic blood pressure) is more than 100. A much lower bottom number even in the 40's is not usually urgent. Try to get back on your CPAP as discussed below. Partial symptomatic epilepsy with complex partial seizures, not intractable, without status epilepticus (HCC)  -     clonazePAM (KLONOPIN) 1 MG tablet; Take 1 tablet by mouth 2 times daily for 90 days.  -     gabapentin (NEURONTIN) 400 MG capsule; TAKE FOUR CAPSULES BY MOUTH NIGHTLY FOR SLEEP. Good control. Continue medication and lifestyle control. Social anxiety disorder  -     clonazePA that he had on his CPAP as discussed below. M (KLONOPIN) 1 MG tablet; Take 1 tablet by mouth 2 times daily for 90 days. Fair control. Continue medicine lifestyle control. Avoid excess caffeine. Try to get back on your CPAP as discussed below. Bipolar 1 disorder, mixed, full remission ([facility] [address] [address]  Fair control.   Continue medicine and lifestyle control. Try to get back on your CPAP as discussed below. A good sleep schedule help support mood stability. Psychophysiological insomnia  -     traZODone (DESYREL) 50 MG tablet; Take 1/2-1 tablets by mouth nightly  When you are able to get back on your CPAP you may need less medication to sleep at night. Too many nighttime sedating medicines can actually make sleep apnea worse. Homocysteinemia  -     FOLATE 400 MCG tablet; Take 2 tablets by mouth daily  Prescription resent in as two pills daily. RUDDY on CPAP  If you are not on her CPAP your blood pressure will go up and you will feel more anxiety and mood problems as well as having poor sleep. Call your mask supplier and get specific about what it is about the mask that bothers you. They have multiple types of masks they can try. Insurance will usually pay for 1 mask every 3 months. Weight loss will also help your sleep apnea. I googled \"how to tolerate CPAP\". The [employer] site had good information with 10 tips to help including 1 I never heard before: Try wearing your mask during part of the day to get used to it i.e. while reading or watching TV or listening to music. Ketamine prescription  Below is the only information available in the chart. KETAMINE HCL (BULK CHEMICALS - K'S)           Ketamine HCl POWD On chart      11/7/2022  Local Medical Record 11/9/2022                    Dispense Date Outside Name Pharmacy Quantity Refills remaining Days supply Sig                  11/7/2022 KETAMINE HYDROCHLORIDE 100 % POWDER (GRAM)  5.00 Device  17      Source: Surescripts (Claim History, Ambulatory)               Since you saw the orthopedics on that day you should contact them about what this prescription was for. Return in about 3 months (around 2/9/2023) for Hypertension, Anxiety, Bipolar. Subjective   SUBJECTIVE/OBJECTIVE:  Chief Complaint   Patient presents with    Anxiety     PHONE CALL VISIT.  ANXIETY, BIPOLAR    104  HPI    Essential hypertension  88/62 P 109. Still uses Solitario lite salt. No exercise. Not light headed. Partial symptomatic epilepsy with complex partial seizures, not intractable, without status epilepticus ([address]  He had an episode 4 weeks ago. No LOC. No injury to self. When occurred he had taken his pills late. Was in fetal position and muscle spasms all over. Social anxiety disorder  Anxiety ! Mg bid. He did take 2 mg only once with episode above. Bipolar 1 disorder, mixed, full remission ([address]  Mood is stable for the most part. Not sleeping well. Wakes with nightmares and not able to go back to sleep. Has woken SOB X4. RUDDY on CPAP  He is unable to wear. Review of Systems    Past Medical History:   Diagnosis Date    Abscess of elbow 2012    right    Alcoholism /alcohol abuse 01/01/2005    Stopped 2014    Arthritis     Durham's esophagus     Bipolar 1 disorder, mixed, full remission ([address] 01/01/2006    Contusion of wrist, right 04/01/2016    Dislocation of part of left shoulder girdle 01/01/1985    arm pulled out of socket - no medical visit at the time    Erosive gastropathy 03/23/2018    antrum    Essential hypertension 01/01/2006    lost 100 lbs in 1 year    GERD (gastroesophageal reflux disease)     Head injury due to trauma     Frontal lobe trauma causing seizures and shakes.  No workup till after seizures/shakes appeared    Major depression in complete remission ([address] 01/01/2006    while on medicine    Osteoarthritis of left AC (acromioclavicular) joint     Osteomyelitis ([address] 01/01/2003    right tibia    Partial complex seizure disorder without intractable epilepsy ([address] 01/01/2006    fist clenching is a typical pre-seizure activity for him    Psychiatric diagnosis     Saw Dr. Cody Hubbard  [phone]    Social anxiety disorder 01/01/2006    moderate       Past Surgical History:   Procedure Laterality Date    APPENDECTOMY  05/22/2012    lap appy    ELBOW DEBRIDEMENT Right     MRSA KNEE SURGERY Right     debridement osteomyelitis    MOUTH SURGERY Left 2019    Lower molar removal    UPPER GASTROINTESTINAL ENDOSCOPY  2010    UPPER GASTROINTESTINAL ENDOSCOPY  2018    gastric and esophageal bx, erosive gastropathy    WISDOM TOOTH EXTRACTION Bilateral 1998    #4 removed       Social History     Socioeconomic History    Marital status: Single     Spouse name: Not on file    Number of children: 0    Years of education: 13    Highest education level: Not on file   Occupational History    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 Occupation: [employer]. Employer: [employer]     Comment: 41 hr/wk   Tobacco Use    Smoking status: Every Day     Packs/day: 1.00     Types: Cigarettes     Last attempt to quit: 3/6/2018     Years since quittin.6    Smokeless tobacco: Never    Tobacco comments:     social.  Have almost quit (nicorette) 3/13/17. Restarted 18 < 1 PPD. .19. 1-1.5 PPD. Plans on quit. 20. He is at 1 pack/day. 21. 1/2 PPD. 1.5 PPD. 22.5/3/21. 1 PPD. 22   Vaping Use    Vaping Use: Never used   Substance and Sexual Activity    Alcohol use: Yes     Alcohol/week: 6.0 standard drinks     Types: 6 Cans of beer per week     Comment: Occasional 3-4 beers. Has not drank at all for 4 weeks. doesn't want to lose GF. 6 pack/nite. Tried AA but it was at [place]. Drug use: Not Currently     Types: Marijuana Jaci Ege)    Sexual activity: Not Currently   Other Topics Concern    Not on file   Social History Narrative    Only exercise is work. 17. 9/. 4/. 7//. 10//. /. 5/. 8/.19. Less time shoeing horse. Only exercises work. 20. No exercise. 21. 21. Plans walking trails. 21. Not currently hiking. 22. None with injury. WORK.5/3/22. No exercise. 11/9/22. Social Determinants of Health     Financial Resource Strain: Low Risk     Difficulty of Paying Living Expenses: Not hard at all   Food Insecurity: No Food Insecurity    Worried About [address] in the Last Year: Never true    [facility] [employer] in the Last Year: Never true   Transportation Needs: No Transportation Needs    Lack of Transportation (Medical): No    Lack of Transportation (Non-Medical):  No   Physical Activity: Not on file   Stress: Not on file   Social Connections: Not on file   Intimate Partner Violence: Not on file   Housing Stability: Not on file       Family History   Problem Relation Age of Onset    Diabetes Mother     Rheum Arthritis Mother     Depression Mother     Hypertension Mother     High Cholesterol Mother     Osteoarthritis Mother         Mother had a knee replacement x 2    Obesity Mother     Other Mother         varicose veins    Diabetes Father     High Blood Pressure Father     High Cholesterol Father     Other Father         interstitial cystitis, DDD C spine    Heart Disease Father         TACHYCARDIA    Mental Illness Brother         schizophrenia - dissociated    Obesity Brother     Diabetes Brother     Liver Disease Brother         fatty liver    High Cholesterol Brother     Hypothyroidism Brother     Obesity Brother     Other Brother         ervin, appendicitis    Glaucoma Brother         occular htn    Other Brother         tonsilitis, gall bladder    Stroke Maternal Grandmother     Diabetes Maternal Grandfather     Coronary Art Dis Maternal Grandfather     Heart Surgery Maternal Grandfather 54        CABG    Other Maternal Grandfather         PAD with amputations    Diabetes Paternal Grandmother     Heart Surgery Paternal Grandmother         aortic valve replacement transvascular    Glaucoma Paternal Great Grandmother     Macular Degen Paternal Great Grandmother     Dementia Paternal Great Grandmother        Allergies   Allergen Reactions    Banana above    Total Time: [address] Teri Kapoor was evaluated through a synchronous (real-time) audio encounter. Patient identification was verified at the start of the visit. He (or guardian if applicable) is aware that this is a billable service, which includes applicable co-pays. This visit was conducted with the patient's (and/or legal guardian's) verbal consent. He has not had a related appointment within my department in the past 7 days or scheduled within the next 24 hours. The patient was located at Home: [facility]. The provider was located at Home ([place] [address]): [place]. Note: not billable if this call serves to triage the patient into an appointment for the relevant concern    An electronic signature was used to authenticate this note.     --Mara Lai, DO

## 2022-11-10 ENCOUNTER — TELEPHONE (OUTPATIENT)
Dept: ADMINISTRATIVE | Age: 42
End: 2022-11-10

## 2022-11-10 NOTE — TELEPHONE ENCOUNTER
PA submitted VIA [facility] for  Folate 400MCG tablets Guerrero: Diandra Del Valle) - [id number] PENDING

## 2022-11-16 NOTE — TELEPHONE ENCOUNTER
\"NO PA REQUIRED AT THIS TIME. \"    If this requires a response please respond to the pool. [address]). Please advise patient thank you.

## 2022-11-28 DIAGNOSIS — F31.78 BIPOLAR 1 DISORDER, MIXED, FULL REMISSION (HCC): ICD-10-CM

## 2022-11-29 NOTE — TELEPHONE ENCOUNTER
Medication:   Requested Prescriptions     Pending Prescriptions Disp Refills    lamoTRIgine (LAMICTAL) 200 MG tablet [Pharmacy Med Name: *LAMOTRIGINE 200 MG TABLET] 60 tablet 0     Sig: TAKE 2 TABLETS BY MOUTH DAILY AT DINNER       Last Filled:  8/9/22    Patient Phone Number: [phone] (home) [phone] (work)    Last appt: 11/9/2022   Next appt: Visit date not found

## 2022-11-30 RX ORDER — LAMOTRIGINE 200 MG/1
TABLET ORAL
Qty: 60 TABLET | Refills: 0 | Status: SHIPPED | OUTPATIENT
Start: 2022-11-30

## 2022-12-19 ENCOUNTER — OFFICE VISIT (OUTPATIENT)
Dept: ORTHOPEDIC SURGERY | Age: 42
End: 2022-12-19
Payer: COMMERCIAL

## 2022-12-19 VITALS — BODY MASS INDEX: 31.52 KG/M2 | WEIGHT: 208 LBS | HEIGHT: 68 IN

## 2022-12-19 DIAGNOSIS — G56.22 ENTRAPMENT OF LEFT ULNAR NERVE: Primary | ICD-10-CM

## 2022-12-19 DIAGNOSIS — M77.12 LATERAL EPICONDYLITIS OF LEFT ELBOW: ICD-10-CM

## 2022-12-19 PROCEDURE — G8417 CALC BMI ABV UP PARAM F/U: HCPCS | Performed by: ORTHOPAEDIC SURGERY

## 2022-12-19 PROCEDURE — G8484 FLU IMMUNIZE NO ADMIN: HCPCS | Performed by: ORTHOPAEDIC SURGERY

## 2022-12-19 PROCEDURE — G8427 DOCREV CUR MEDS BY ELIG CLIN: HCPCS | Performed by: ORTHOPAEDIC SURGERY

## 2022-12-19 PROCEDURE — 4004F PT TOBACCO SCREEN RCVD TLK: CPT | Performed by: ORTHOPAEDIC SURGERY

## 2022-12-19 PROCEDURE — 99214 OFFICE O/P EST MOD 30 MIN: CPT | Performed by: ORTHOPAEDIC SURGERY

## 2022-12-19 NOTE — PROGRESS NOTES
[facility]  Office Visit    Chief Complaint    Elbow Pain (F/U LEFT ELBOW. RESULTS EMG LUE)      History of Present Illness:  Nichole Merlin is a 38 y.o. male who presents for  follow-up of left elbow pain. Pain assessment as described below and reviewed today with the patient. He recently underwent an EMG of his left upper extremity on 12/01/2022 and is here today for results. He reports having left sided elbow pain for the past 6+ months now. His symptoms have somewhat worsened since his last visit especially 4th and 5th finger numbness and parasthesias. The patient denies any setbacks. He works with horses. Pain Assessment  Location of Pain: Elbow  Location Modifiers: Left  Severity of Pain: 5  Quality of Pain: Sharp, Aching  Frequency of Pain: Constant  Aggravating Factors: Bending, Straightening  Limiting Behavior: Yes  Relieving Factors: Other (Comment)  Result of Injury: No  Work-Related Injury: No  Are there other pain locations you wish to document?: No    Past Medical History:   Diagnosis Date    Abscess of elbow 2012    right    Alcoholism /alcohol abuse 01/01/2005    Stopped 2014    Arthritis     Durham's esophagus     Bipolar 1 disorder, mixed, full remission ([address] 01/01/2006    Contusion of wrist, right 04/01/2016    Dislocation of part of left shoulder girdle 01/01/1985    arm pulled out of socket - no medical visit at the time    Erosive gastropathy 03/23/2018    antrum    Essential hypertension 01/01/2006    lost 100 lbs in 1 year    GERD (gastroesophageal reflux disease)     Head injury due to trauma     Frontal lobe trauma causing seizures and shakes.  No workup till after seizures/shakes appeared    Major depression in complete remission ([address] 01/01/2006    while on medicine    Osteoarthritis of left AC (acromioclavicular) joint     Osteomyelitis ([address] 01/01/2003    right tibia    Partial complex seizure disorder without intractable epilepsy ([facility] [address] 01/01/2006    fist clenching is a typical pre-seizure activity for him    Psychiatric diagnosis     Saw Dr. Miller Grand  [phone]    Social anxiety disorder 01/01/2006    moderate        Past Surgical History:   Procedure Laterality Date    APPENDECTOMY  05/22/2012    lap appy    ELBOW DEBRIDEMENT Right     MRSA    KNEE SURGERY Right     debridement osteomyelitis    MOUTH SURGERY Left 2019    Lower molar removal    UPPER GASTROINTESTINAL ENDOSCOPY  12/16/2010    UPPER GASTROINTESTINAL ENDOSCOPY  03/23/2018    gastric and esophageal bx, erosive gastropathy    WISDOM TOOTH EXTRACTION Bilateral 1998    #4 removed       Family History   Problem Relation Age of Onset    Diabetes Mother     Rheum Arthritis Mother     Depression Mother     Hypertension Mother     High Cholesterol Mother     Osteoarthritis Mother         Mother had a knee replacement x 2    Obesity Mother     Other Mother         varicose veins    Diabetes Father     High Blood Pressure Father     High Cholesterol Father     Other Father         interstitial cystitis, DDD C spine    Heart Disease Father         TACHYCARDIA    Mental Illness Brother         schizophrenia - dissociated    Obesity Brother     Diabetes Brother     Liver Disease Brother         fatty liver    High Cholesterol Brother     Hypothyroidism Brother     Obesity Brother     Other Brother         ervin, appendicitis    Glaucoma Brother         occular htn    Other Brother         tonsilitis, gall bladder    Stroke Maternal Grandmother     Diabetes Maternal Grandfather     Coronary Art Dis Maternal Grandfather     Heart Surgery Maternal Grandfather 54        CABG    Other Maternal Grandfather         PAD with amputations    Diabetes Paternal Grandmother     Heart Surgery Paternal Grandmother         aortic valve replacement transvascular    Glaucoma Paternal Great Grandmother     Macular Degen Paternal Great Grandmother     Dementia Paternal Great Grandmother        Social History     Socioeconomic History    Marital status: Single     Spouse name: None    Number of children: 0    Years of education: 13    Highest education level: None   Occupational History    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 Occupation: . Employer: [employer]     Comment: 41 hr/wk   Tobacco Use    Smoking status: Every Day     Packs/day: 1.00     Types: Cigarettes     Last attempt to quit: 3/6/2018     Years since quittin.7    Smokeless tobacco: Never    Tobacco comments:     social.  Have almost quit (nicorette) 3/13/17. Restarted 18 < 1 PPD. .19. 1-1.5 PPD. Plans on quit. 20. He is at 1 pack/day. 21. 1/2 PPD. 1.5 PPD. 22.5/3/21. 1 PPD. 22   Vaping Use    Vaping Use: Never used   Substance and Sexual Activity    Alcohol use: Yes     Alcohol/week: 6.0 standard drinks     Types: 6 Cans of beer per week     Comment: Occasional 3-4 beers. Has not drank at all for 4 weeks. doesn't want to lose GF. 6 pack/nite. Tried AA but it was at [facility]. Drug use: Not Currently     Types: Marijuana Dariel Passy)    Sexual activity: Not Currently   Social History Narrative    Only exercise is work. 17. 9/. 4/. 7/. 10//18. 1/. 5/. 8.19. Less time shoeing horse. Only exercises work. 20. No exercise. 21. 21. Plans walking trails. 21. Not currently hiking. 22. None with injury. WORK.5/3/22. No exercise. 22.      Social Determinants of Health     Financial Resource Strain: Low Risk     Difficulty of Paying Living Expenses: Not hard at all   Food Insecurity: No Food Insecurity    Worried About [facility] in the Last Year: Never true    Ran Out of Food in the Last Year: Never true   Transportation Needs: No Transportation Needs    Lack of Transportation (Medical): No    Lack of Transportation (Non-Medical): No       Current Outpatient Medications   Medication Sig Dispense Refill    lamoTRIgine (LAMICTAL) 200 MG tablet TAKE 2 TABLETS BY MOUTH DAILY AT DINNER 60 tablet 0    Ketamine HCl POWD       clonazePAM (KLONOPIN) 1 MG tablet Take 1 tablet by mouth 2 times daily for 90 days. 60 tablet 2    FOLATE 400 MCG tablet Take 2 tablets by mouth daily 180 tablet 3    traZODone (DESYREL) 50 MG tablet Take 1/2-1 tablets by mouth nightly 30 tablet 5    omeprazole (PRILOSEC) 20 MG delayed release capsule       ziprasidone (GEODON) 40 MG capsule Take 1 capsule by mouth daily Take with 60 mg at dinner 90 capsule 1    amLODIPine (NORVASC) 2.5 MG tablet TAKE 1 TABLET DAILY 90 tablet 1    nadolol (CORGARD) 20 MG tablet 1/2 TABLET DAILY BY MOUTH FOR HIGH BLOOD PRESSURE 45 tablet 2    Cholecalciferol (VITAMIN D3) 125 MCG (5000 UT) CAPS Indications: Vitamin D Deficiency Take 1 capsule daily by mouth 5 days/week i.e. skip Monday and Thursday 22 capsule 11    diclofenac sodium (VOLTAREN) 1 % GEL Apply 4 g topically 4 times daily as needed for Pain      fexofenadine (ALLEGRA) 180 MG tablet Take 1 tablet by mouth daily 30 tablet 11    acetaminophen (TYLENOL) 500 MG tablet Take 2 tablets by mouth 3 times daily as needed for Pain 180 tablet 0    GLUCOSAMINE-CHONDROITIN ER PO Take by mouth 2 times daily      guaiFENesin (MUCINEX) 600 MG extended release tablet Take 600 mg by mouth daily       Melatonin 5 MG TABS tablet Take 1 tablet by mouth nightly      therapeutic multivitamin-minerals (THERAGRAN-M) tablet Take 1 tablet by mouth daily.       gabapentin (NEURONTIN) 400 MG capsule TAKE FOUR CAPSULES BY MOUTH NIGHTLY FOR SLEEP. 120 capsule 2    Diclofenac Sodium POWD Apply 1-2 g topically 3 times daily as needed (As needed for pain) Ketamine 5% Gabapentin 5% Cyclobenzaprine 2% Diclofenac 3% Lidocaine 3% 100 g 0    ziprasidone (GEODON) 60 MG capsule Take 1 capsule by mouth in the morning and 1 capsule in the evening. Take with meals. Take a 60 mg with a 40 mg at dinner. 180 capsule 1    trihexyphenidyl (ARTANE) 2 MG tablet Take 1 tablet by mouth in the morning and 1 tablet before bedtime. 180 tablet 1    atorvastatin (LIPITOR) 10 MG tablet Take 1 tablet by mouth in the morning. 90 tablet 1    clonazePAM (KLONOPIN) 2 MG tablet Take 1 tablet by mouth 2 times daily as needed (SEIZURES) for up to 91 days. TAKE TO ABORT SEIZURES 60 tablet 1     No current facility-administered medications for this visit. Allergies   Allergen Reactions    Banana Hives and Itching    Iodides      Other reaction(s): GI Intolerance    Ancef [Cefazolin Sodium] Rash    Iodine Nausea And Vomiting     WITH IODINATED CONTRAST    Shellfish-Derived Products Nausea And Vomiting         Review of Systems:  A 14 point review of systems available in the scanned medical record as documented by the patient. Today's review pertinent items are noted in HPI. Vital Signs:   Ht 5' 8\" (1.727 m)   Wt 208 lb (94.3 kg)   BMI 31.63 kg/m²     General Exam:    Neuro: Alert & Oriented x 3,  normal,  no focal deficits noted. Normal mood & affect. Eyes: Sclera clear  Ears: Normal external ear  Mouth:  No perioral lesions  Pulm: Respirations unlabored and regular   Skin: Warm, well perfused      Left Elbow Examination:    Inspection:  No skin lesions, no deformity, no swelling    Palpation:  No tenderness to palpation over the lateral epicondyle, medial epicondyle, olecranon. No tenderness to palpation over the mobile wad, flexor pronator mass, biceps, triceps.   No tenderness over the ulnar collateral ligament and the lateral collateral ligament complex    Range of Motion: full extension and able to touch her shoulders in flexion, normal supination and pronation with the elbow at 90°    Strength:  Normal strength of the wrist extensors and flexors, normal strength of the biceps and triceps    Stability:  No instability noted to varus and valgus stress, no rotatory instability with pushup test    Neurovascular: Palpable radial pulse, normal sensation in the median, ulnar, radial nerve distributions    Special Tests:  Positive resisted wrist extension, positive Tinel's at the cubital tunnel. Radiology:     No new imaging was obtained today      EMG Left Upper Extremity 12/01/2022 reviewed today in the office shows:  Left ulnar nerve compression syndrome across the elbow. No evidence of a radiculopathy, peripheral neuropathy, or carpal tunnel syndrome. Assessment: Patient is a 38 y.o. male with left sided ulnar nerve compression syndrome proven on EMG/NCS. Impression:  Visit Diagnoses         Codes    Entrapment of left ulnar nerve    -  Primary G56.22    Lateral epicondylitis of left elbow     M77.12            Office Procedures:  No orders of the defined types were placed in this encounter. No orders of the defined types were placed in this encounter. Plan:  Pertinent imaging was reviewed. The etiology, natural history, and treatment options for the disorder were discussed. The roles of activity modification, antiinflammatory medicine, injections, bracing, physical therapy, and surgical interventions were all described to the patient and questions were answered. We believe patient is a candidate for either a possible cubital tunnel surgery by myself versus a nerve hydro-dissection by Dr. Amena Roe. He wishes to further weigh his options prior to making a decision today. My office will call the patient tomorrow to see which he decides. All the patient's questions were answered while in the clinic. The patient is understanding of all instructions and agrees with the plan. Approximately 30 minutes was spent on patient education and coordinating care. Follow up in: No follow-ups on file. Sincerely,    I, Thao Doyle, am scribing for Dr. Laura Coronel MD.  12/19/22 1:07 PM Thao Oneal. The physical examination was performed between the patient and Dr. Parish Evans MD.  All counseling during the appointment was performed between the patient and the provider. Parish Evans MD [address] [facility]   [address] [place], [address] [place]  Email: [email or web address]  Office: [phone]    The encounter with Pau Gordon was carried out by myself, Dr Katrina Zhu, who personally examined the patient and reviewed the plan. This dictation was performed with a verbal recognition program (DRAGON) and it was checked for errors. It is possible that there are still dictated errors within this office note. If so, please bring any errors to my attention for an addendum. All efforts were made to ensure that this office note is accurate.        12/19/22  1:07 PM

## 2022-12-19 NOTE — LETTER
[facility]  [address]  [address]  Phone: [phone]  Fax: [phone]    Preet Verma MD    December 19, 2022     [facility] [address]    Patient: Emil Flores   MR Number: [id number]   YOB: 1980   Date of Visit: 12/19/2022       Dear Nicolle Preston: Thank you for referring Melanie Dupont to me for evaluation/treatment. Below are the relevant portions of my assessment and plan of care. If you have questions, please do not hesitate to call me. I look forward to following Uli Benton along with you.     Sincerely,      Preet Verma MD

## 2022-12-21 ENCOUNTER — TELEPHONE (OUTPATIENT)
Dept: ORTHOPEDIC SURGERY | Age: 42
End: 2022-12-21

## 2022-12-21 NOTE — TELEPHONE ENCOUNTER
Usman on  requesting patient call back with decision on L wrist surgery or referral to Dr. Milan Loya.        (If patient chooses referral, please schedule NP appt with Dr. Milan Loya for left wrist.)

## 2022-12-26 ENCOUNTER — APPOINTMENT (OUTPATIENT)
Dept: GENERAL RADIOLOGY | Age: 42
End: 2022-12-26
Payer: OTHER MISCELLANEOUS

## 2022-12-26 ENCOUNTER — HOSPITAL ENCOUNTER (EMERGENCY)
Age: 42
Discharge: HOME | End: 2022-12-26
Payer: OTHER MISCELLANEOUS

## 2022-12-26 VITALS
TEMPERATURE: 98.7 F | DIASTOLIC BLOOD PRESSURE: 95 MMHG | OXYGEN SATURATION: 96 % | HEART RATE: 85 BPM | SYSTOLIC BLOOD PRESSURE: 127 MMHG | RESPIRATION RATE: 18 BRPM
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
## 2022-12-26 PROCEDURE — 99283 EMERGENCY DEPT VISIT LOW MDM: CPT

## 2022-12-26 PROCEDURE — 71101 X-RAY EXAM UNILAT RIBS/CHEST: CPT

## 2022-12-26 RX ORDER — DICLOFENAC SODIUM 75 MG/1
75 TABLET, DELAYED RELEASE ORAL 2 TIMES DAILY
Qty: 14 TABLET | Refills: 0 | Status: SHIPPED | OUTPATIENT
Start: 2022-12-26 | End: 2023-01-02

## 2022-12-26 ASSESSMENT — PAIN SCALES - GENERAL: PAINLEVEL_OUTOF10: 8

## 2022-12-26 ASSESSMENT — PAIN - FUNCTIONAL ASSESSMENT: PAIN_FUNCTIONAL_ASSESSMENT: 0-10
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
## 2022-12-28 DIAGNOSIS — E78.00 HYPERCHOLESTEROLEMIA: ICD-10-CM

## 2022-12-28 RX ORDER — ATORVASTATIN CALCIUM 10 MG/1
10 TABLET, FILM COATED ORAL DAILY
Qty: 30 TABLET | Refills: 2 | Status: SHIPPED | OUTPATIENT
Start: 2022-12-28 | End: 2023-03-28

## 2023-01-06 ENCOUNTER — TELEPHONE (OUTPATIENT)
Dept: FAMILY MEDICINE CLINIC | Age: 43
End: 2023-01-06

## 2023-01-06 DIAGNOSIS — G40.209 PARTIAL SYMPTOMATIC EPILEPSY WITH COMPLEX PARTIAL SEIZURES, NOT INTRACTABLE, WITHOUT STATUS EPILEPTICUS (HCC): Chronic | ICD-10-CM

## 2023-01-06 NOTE — TELEPHONE ENCOUNTER
Pt last appt with Dr Robert Angeles (video)  was 11.9.22. Pt needs a refill on his  2 mg Klonopin #60  (this is his emergency dose)    He is out of this completely.     [facility] [address]    Pt made an appt for Feb 9, 23 at 1PM  for Dr Robert Angeles

## 2023-01-07 RX ORDER — CLONAZEPAM 2 MG/1
2 TABLET ORAL 2 TIMES DAILY PRN
Qty: 30 TABLET | Refills: 0 | Status: SHIPPED | OUTPATIENT
Start: 2023-01-07 | End: 2023-02-06

## 2023-01-07 NOTE — TELEPHONE ENCOUNTER
Partial symptomatic epilepsy with complex partial seizures, not intractable, without status epilepticus (HCC)  -     clonazePAM (KLONOPIN) 2 MG tablet; Take 1 tablet by mouth 2 times daily as needed (SEIZURES) for up to 30 days. TAKE TO ABORT SEIZURES Max Daily Amount: 4 mg  Controlled Substance Monitoring:  Acute and Chronic Pain Monitoring:   RX Monitoring 1/7/2023   Attestation -   Acute Pain Prescriptions -   Periodic Controlled Substance Monitoring Possible medication side effects, risk of tolerance/dependence & alternative treatments discussed. ;No signs of potential drug abuse or diversion identified.

## 2023-01-09 ENCOUNTER — TELEPHONE (OUTPATIENT)
Dept: ORTHOPEDIC SURGERY | Age: 43
End: 2023-01-09

## 2023-01-09 NOTE — TELEPHONE ENCOUNTER
General Question     Subject: SX L ELBOW   Patient and /or Facility Request: Remiinna Fernandes  Contact Number: [phone] ([facility])    PATIENT CALLED IN TO GET AN APPT FOR SX ON HIS L ELBOW. Demetrius Jiang PLEASE CALL PATIENT BACK AT THE ABOVE NUMBERS.

## 2023-01-09 NOTE — TELEPHONE ENCOUNTER
Patient returning PSS call from 12/21/2022.   Patient will be called on Tuesday admin day to schedule

## 2023-01-10 DIAGNOSIS — M77.12 LATERAL EPICONDYLITIS OF LEFT ELBOW: ICD-10-CM

## 2023-01-10 DIAGNOSIS — G56.22 ENTRAPMENT OF LEFT ULNAR NERVE: Primary | ICD-10-CM

## 2023-01-10 DIAGNOSIS — Z01.818 PRE-OPERATIVE CLEARANCE: ICD-10-CM

## 2023-01-25 RX ORDER — OMEPRAZOLE 20 MG/1
CAPSULE, DELAYED RELEASE ORAL
Qty: 30 CAPSULE | Refills: 0 | Status: SHIPPED | OUTPATIENT
Start: 2023-01-25

## 2023-01-25 NOTE — TELEPHONE ENCOUNTER
Future Appointments    Encounter Information    Provider Department Appt Notes   2/7/2023 Tiffanie Mcclain, APRN - [address] [facility] Appt Reason: Routine Pre-Op   left elbow surgery 2.21.23 Dr. Estrella Restrepo, labs/ekg, screened green 1.16.23   Booking Code: [id number]                             Past Visits    Date Provider Specialty Visit Type Primary Dx       Entrapment of left ulnar nerve   11/09/2022 Yariel Mims DO Family Medicine Telemedicine Essential hypertension

## 2023-02-03 ENCOUNTER — TELEPHONE (OUTPATIENT)
Dept: ORTHOPEDIC SURGERY | Age: 43
End: 2023-02-03

## 2023-02-07 ENCOUNTER — OFFICE VISIT (OUTPATIENT)
Dept: FAMILY MEDICINE CLINIC | Age: 43
End: 2023-02-07

## 2023-02-07 VITALS
WEIGHT: 222 LBS | OXYGEN SATURATION: 93 % | HEIGHT: 68 IN | HEART RATE: 72 BPM | SYSTOLIC BLOOD PRESSURE: 110 MMHG | BODY MASS INDEX: 33.65 KG/M2 | DIASTOLIC BLOOD PRESSURE: 84 MMHG | RESPIRATION RATE: 20 BRPM

## 2023-02-07 DIAGNOSIS — G25.89 EXTRAPYRAMIDAL MOVEMENT DISORDER, DRUG-INDUCED: ICD-10-CM

## 2023-02-07 DIAGNOSIS — G56.22 ULNAR NERVE IMPINGEMENT, LEFT: Primary | ICD-10-CM

## 2023-02-07 DIAGNOSIS — Z01.818 PREOP EXAMINATION: ICD-10-CM

## 2023-02-07 DIAGNOSIS — F31.78 BIPOLAR 1 DISORDER, MIXED, FULL REMISSION (HCC): ICD-10-CM

## 2023-02-07 DIAGNOSIS — K21.9 GASTROESOPHAGEAL REFLUX DISEASE, UNSPECIFIED WHETHER ESOPHAGITIS PRESENT: ICD-10-CM

## 2023-02-07 DIAGNOSIS — F40.10 SOCIAL ANXIETY DISORDER: Chronic | ICD-10-CM

## 2023-02-07 DIAGNOSIS — Z72.0 TOBACCO ABUSE: ICD-10-CM

## 2023-02-07 DIAGNOSIS — G40.209 PARTIAL SYMPTOMATIC EPILEPSY WITH COMPLEX PARTIAL SEIZURES, NOT INTRACTABLE, WITHOUT STATUS EPILEPTICUS (HCC): Chronic | ICD-10-CM

## 2023-02-07 DIAGNOSIS — T50.905A EXTRAPYRAMIDAL MOVEMENT DISORDER, DRUG-INDUCED: ICD-10-CM

## 2023-02-07 RX ORDER — VARENICLINE TARTRATE 1 MG/1
1 TABLET, FILM COATED ORAL 2 TIMES DAILY
Qty: 180 TABLET | Refills: 1 | Status: SHIPPED | OUTPATIENT
Start: 2023-02-07

## 2023-02-07 RX ORDER — OMEPRAZOLE 20 MG/1
CAPSULE, DELAYED RELEASE ORAL
Qty: 90 CAPSULE | Refills: 3 | Status: SHIPPED | OUTPATIENT
Start: 2023-02-07

## 2023-02-07 RX ORDER — TRIHEXYPHENIDYL HYDROCHLORIDE 2 MG/1
2 TABLET ORAL 2 TIMES DAILY
Qty: 180 TABLET | Refills: 1 | Status: SHIPPED | OUTPATIENT
Start: 2023-02-07 | End: 2023-05-08

## 2023-02-07 RX ORDER — VARENICLINE TARTRATE 0.5 MG/1
.5-1 TABLET, FILM COATED ORAL SEE ADMIN INSTRUCTIONS
Qty: 57 TABLET | Refills: 0 | Status: SHIPPED | OUTPATIENT
Start: 2023-02-07

## 2023-02-07 RX ORDER — LAMOTRIGINE 200 MG/1
TABLET ORAL
Qty: 60 TABLET | Refills: 0 | Status: SHIPPED | OUTPATIENT
Start: 2023-02-07

## 2023-02-07 RX ORDER — CLONAZEPAM 1 MG/1
1 TABLET ORAL 2 TIMES DAILY
Qty: 60 TABLET | Refills: 0 | Status: SHIPPED | OUTPATIENT
Start: 2023-02-07 | End: 2023-03-09

## 2023-02-07 RX ORDER — CLONAZEPAM 2 MG/1
2 TABLET ORAL 2 TIMES DAILY PRN
Qty: 30 TABLET | Refills: 0 | Status: SHIPPED | OUTPATIENT
Start: 2023-02-07 | End: 2023-03-09

## 2023-02-07 SDOH — ECONOMIC STABILITY: FOOD INSECURITY: WITHIN THE PAST 12 MONTHS, YOU WORRIED THAT YOUR FOOD WOULD RUN OUT BEFORE YOU GOT MONEY TO BUY MORE.: NEVER TRUE

## 2023-02-07 SDOH — ECONOMIC STABILITY: INCOME INSECURITY: HOW HARD IS IT FOR YOU TO PAY FOR THE VERY BASICS LIKE FOOD, HOUSING, MEDICAL CARE, AND HEATING?: NOT HARD AT ALL

## 2023-02-07 SDOH — ECONOMIC STABILITY: HOUSING INSECURITY
IN THE LAST 12 MONTHS, WAS THERE A TIME WHEN YOU DID NOT HAVE A STEADY PLACE TO SLEEP OR SLEPT IN A SHELTER (INCLUDING NOW)?: NO

## 2023-02-07 SDOH — ECONOMIC STABILITY: FOOD INSECURITY: WITHIN THE PAST 12 MONTHS, THE FOOD YOU BOUGHT JUST DIDN'T LAST AND YOU DIDN'T HAVE MONEY TO GET MORE.: NEVER TRUE

## 2023-02-07 ASSESSMENT — PATIENT HEALTH QUESTIONNAIRE - PHQ9
4. FEELING TIRED OR HAVING LITTLE ENERGY: 0
SUM OF ALL RESPONSES TO PHQ QUESTIONS 1-9: 0
SUM OF ALL RESPONSES TO PHQ QUESTIONS 1-9: 0
5. POOR APPETITE OR OVEREATING: 0
3. TROUBLE FALLING OR STAYING ASLEEP: 0
8. MOVING OR SPEAKING SO SLOWLY THAT OTHER PEOPLE COULD HAVE NOTICED. OR THE OPPOSITE, BEING SO FIGETY OR RESTLESS THAT YOU HAVE BEEN MOVING AROUND A LOT MORE THAN USUAL: 0
9. THOUGHTS THAT YOU WOULD BE BETTER OFF DEAD, OR OF HURTING YOURSELF: 0
7. TROUBLE CONCENTRATING ON THINGS, SUCH AS READING THE NEWSPAPER OR WATCHING TELEVISION: 0
2. FEELING DOWN, DEPRESSED OR HOPELESS: 0
10. IF YOU CHECKED OFF ANY PROBLEMS, HOW DIFFICULT HAVE THESE PROBLEMS MADE IT FOR YOU TO DO YOUR WORK, TAKE CARE OF THINGS AT HOME, OR GET ALONG WITH OTHER PEOPLE: 0
SUM OF ALL RESPONSES TO PHQ QUESTIONS 1-9: 0
SUM OF ALL RESPONSES TO PHQ QUESTIONS 1-9: 0
6. FEELING BAD ABOUT YOURSELF - OR THAT YOU ARE A FAILURE OR HAVE LET YOURSELF OR YOUR FAMILY DOWN: 0

## 2023-02-07 NOTE — PROGRESS NOTES
Preoperative Consultation      Mickey Nolasco  YOB: 1980    Date of Service:  2/7/2023    Vitals:    02/07/23 1401   BP: 110/84   Site: Right Upper Arm   Position: Sitting   Cuff Size: Large Adult   Pulse: 72   Resp: 20   SpO2: 93%   Weight: 222 lb (100.7 kg)   Height: 5' 8\" (1.727 m)      Wt Readings from Last 2 Encounters:   02/07/23 222 lb (100.7 kg)   12/19/22 208 lb (94.3 kg)     BP Readings from Last 3 Encounters:   02/07/23 110/84   12/26/22 (!) 127/95   08/09/22 118/86        Chief Complaint   Patient presents with    Pre-op Exam     2/21/23  Surgeon Role  Margie Zayas MD Primary     Procedure Laterality   LEFT CUBITAL TUNNEL RELEASE - LOCAL Left      Allergies   Allergen Reactions    Banana Hives and Itching    Iodides      Other reaction(s): GI Intolerance    Ancef [Cefazolin Sodium] Rash    Iodine Nausea And Vomiting     WITH IODINATED CONTRAST    Shellfish-Derived Products Nausea And Vomiting     Outpatient Medications Marked as Taking for the 2/7/23 encounter (Office Visit) with MIKE Crowell - CNP   Medication Sig Dispense Refill    omeprazole (PRILOSEC) 20 MG delayed release capsule Take 1 capsule by mouth every morning (before breakfast) 30 capsule 0    clonazePAM (KLONOPIN) 2 MG tablet Take 1 tablet by mouth 2 times daily as needed (SEIZURES) for up to 30 days. TAKE TO ABORT SEIZURES Max Daily Amount: 4 mg 30 tablet 0    atorvastatin (LIPITOR) 10 MG tablet Take 1 tablet by mouth in the morning. 30 tablet 2    diclofenac (VOLTAREN) 75 MG EC tablet Take 1 tablet by mouth 2 times daily for 7 days 14 tablet 0    lamoTRIgine (LAMICTAL) 200 MG tablet TAKE 2 TABLETS BY MOUTH DAILY AT DINNER 60 tablet 0    Ketamine HCl POWD       clonazePAM (KLONOPIN) 1 MG tablet Take 1 tablet by mouth 2 times daily for 90 days. 60 tablet 2    FOLATE 400 MCG tablet Take 2 tablets by mouth daily 180 tablet 3    gabapentin (NEURONTIN) 400 MG capsule TAKE FOUR CAPSULES BY MOUTH NIGHTLY FOR  SLEEP. 120 capsule 2    traZODone (DESYREL) 50 MG tablet Take 1/2-1 tablets by mouth nightly 30 tablet 5    Diclofenac Sodium POWD Apply 1-2 g topically 3 times daily as needed (As needed for pain) Ketamine 5% Gabapentin 5% Cyclobenzaprine 2% Diclofenac 3% Lidocaine 3% 100 g 0    ziprasidone (GEODON) 40 MG capsule Take 1 capsule by mouth daily Take with 60 mg at dinner 90 capsule 1    ziprasidone (GEODON) 60 MG capsule Take 1 capsule by mouth in the morning and 1 capsule in the evening. Take with meals. Take a 60 mg with a 40 mg at dinner. 180 capsule 1    amLODIPine (NORVASC) 2.5 MG tablet TAKE 1 TABLET DAILY 90 tablet 1    nadolol (CORGARD) 20 MG tablet 1/2 TABLET DAILY BY MOUTH FOR HIGH BLOOD PRESSURE 45 tablet 2    trihexyphenidyl (ARTANE) 2 MG tablet Take 1 tablet by mouth in the morning and 1 tablet before bedtime. 180 tablet 1    Cholecalciferol (VITAMIN D3) 125 MCG (5000 UT) CAPS Indications: Vitamin D Deficiency Take 1 capsule daily by mouth 5 days/week i.e. skip Monday and Thursday 22 capsule 11    diclofenac sodium (VOLTAREN) 1 % GEL Apply 4 g topically 4 times daily as needed for Pain      fexofenadine (ALLEGRA) 180 MG tablet Take 1 tablet by mouth daily 30 tablet 11    acetaminophen (TYLENOL) 500 MG tablet Take 2 tablets by mouth 3 times daily as needed for Pain 180 tablet 0    GLUCOSAMINE-CHONDROITIN ER PO Take by mouth 2 times daily      guaiFENesin (MUCINEX) 600 MG extended release tablet Take 600 mg by mouth daily       Melatonin 5 MG TABS tablet Take 1 tablet by mouth nightly      therapeutic multivitamin-minerals (THERAGRAN-M) tablet Take 1 tablet by mouth daily.        Bipolar disorder and anxiety  He is doing well on his current medications  He does need refills  lamictal- artane- klonopin prilosec  He gets stressed out when he does not sleep well  Lots of things can get him stressed out movies - life   His mom states he gets really tensed and rigid -   Takes klonopin daily for  maintenance  The 2 mg tabs as needed those times when he is really   60 tabs usually last six or more months    This patient presents to the office today for a preoperative consultation at the request of surgeon, Dr Evangelina Granger, who plans on performing LEFT CUBITAL [address] on February 21 at [facility].  The current problem began several years ago  ago, and symptoms  numbness and weakness in his hand  have been worsening with time. Conservative therapy: Yes: splinting , which has been not very effective. .    Planned anesthesia: General   Known anesthesia problems: None   Bleeding risk: No recent or remote history of abnormal bleeding  Personal or FH of DVT/PE: Yes - mom - dvt    Patient objection to receiving blood products: No    Patient Active Problem List   Diagnosis    Essential hypertension    Seizure    Psychiatric diagnosis    Psychiatric problem    Palpitations    Partial complex seizure disorder without intractable epilepsy ([address]    Social anxiety disorder    Major depression in complete remission ([address]    Bipolar 1 disorder, mixed, full remission ([address]    De Quervain's tenosynovitis    Paresthesias of both legs posteriorly    Durham's esophagus    Former smoker    Erosive gastropathy    Closed fracture of one rib of right side with routine healing    Gastroesophageal reflux disease    Vitamin D deficiency    Pure hypercholesterolemia    Dislocation of part of left shoulder girdle    Osteoarthritis of left AC (acromioclavicular) joint    Arthritis of left acromioclavicular joint    Homocystinuria ([address]    Tobacco abuse    Psychophysiological insomnia    RUDDY on CPAP    Homocysteinemia       Past Medical History:   Diagnosis Date    Abscess of elbow 2012    right    Alcoholism /alcohol abuse 01/01/2005    Stopped 2014    Arthritis     Durham's esophagus     Bipolar 1 disorder, mixed, full remission ([address] 01/01/2006    Contusion of wrist, right 04/01/2016    Dislocation of part of left shoulder girdle 01/01/1985    arm pulled out of socket - no medical visit at the time    Erosive gastropathy 03/23/2018    antrum    Essential hypertension 01/01/2006    lost 100 lbs in 1 year    GERD (gastroesophageal reflux disease)     Head injury due to trauma     Frontal lobe trauma causing seizures and shakes.  No workup till after seizures/shakes appeared    Major depression in complete remission ([address] 01/01/2006    while on medicine    Osteoarthritis of left AC (acromioclavicular) joint     Osteomyelitis ([address] 01/01/2003    right tibia    Partial complex seizure disorder without intractable epilepsy ([address] 01/01/2006    fist clenching is a typical pre-seizure activity for him    Psychiatric diagnosis     Saw Dr. Gilbert Thomson  [phone]    Social anxiety disorder 01/01/2006    moderate     Past Surgical History:   Procedure Laterality Date    APPENDECTOMY  05/22/2012    lap appy    ELBOW DEBRIDEMENT Right     MRSA    KNEE SURGERY Right     debridement osteomyelitis    MOUTH SURGERY Left 2019    Lower molar removal    UPPER GASTROINTESTINAL ENDOSCOPY  12/16/2010    UPPER GASTROINTESTINAL ENDOSCOPY  03/23/2018    gastric and esophageal bx, erosive gastropathy    WISDOM TOOTH EXTRACTION Bilateral 1998    #4 removed     Family History   Problem Relation Age of Onset    Diabetes Mother     Rheum Arthritis Mother     Depression Mother     Hypertension Mother     High Cholesterol Mother     Osteoarthritis Mother         Mother had a knee replacement x 2    Obesity Mother     Other Mother         varicose veins    Diabetes Father     High Blood Pressure Father     High Cholesterol Father     Other Father         interstitial cystitis, DDD C spine    Heart Disease Father         TACHYCARDIA    Mental Illness Brother         schizophrenia - dissociated    Obesity Brother     Diabetes Brother     Liver Disease Brother         fatty liver    High Cholesterol Brother     Hypothyroidism Brother     Obesity Brother     Other Brother         ervin, appendicitis    Glaucoma Brother         occular htn    Other Brother         tonsilitis, gall bladder    Stroke Maternal Grandmother     Diabetes Maternal Grandfather     Coronary Art Dis Maternal Grandfather     Heart Surgery Maternal Grandfather 54        CABG    Other Maternal Grandfather         PAD with amputations    Diabetes Paternal Grandmother     Heart Surgery Paternal Grandmother         aortic valve replacement transvascular    Glaucoma Paternal Great Grandmother     Macular Degen Paternal Great Grandmother     Dementia Paternal Great Grandmother      Social History     Socioeconomic History    Marital status: Single     Spouse name: Not on file    Number of children: 0    Years of education: 13    Highest education level: Not on file   Occupational History    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 Occupation: [employer]. Employer: [employer]     Comment: 41 hr/wk   Tobacco Use    Smoking status: Every Day     Packs/day: 1.00     Types: Cigarettes     Last attempt to quit: 3/6/2018     Years since quittin.9    Smokeless tobacco: Never    Tobacco comments:     social.  Have almost quit (nicorette) 3/13/17. Restarted 18 < 1 PPD. .19. 1-1.5 PPD. Plans on quit. 20. He is at 1 pack/day. 21. 1/2 PPD. 1.5 PPD. 22.5/3/21. 1 PPD. 22   Vaping Use    Vaping Use: Never used   Substance and Sexual Activity    Alcohol use: Yes     Alcohol/week: 6.0 standard drinks     Types: 6 Cans of beer per week     Comment: Occasional 3-4 beers. Has not drank at all for 4 weeks. doesn't want to lose GF. 6 pack/nite. Tried AA but it was at [facility].     Drug use: Not Currently     Types: Marijuana Maribel Keller)    Sexual activity: Not Currently   Other Topics Concern    Not on file   Social History Narrative    Only exercise is work. 6/8/17. 9/18/17. 4/12/18. 7/23/18. 10/22/18. 1/21/19. 5/6/19. 8/9/19.11/27/19. Less time shoeing horse. Only exercises work. 9/25/20. No exercise. 1/8/21. 4/1/21. Plans walking trails. 8/4/21. Not currently hiking. 2/2/22. None with injury. WORK.5/3/22. No exercise. 11/9/22. Social Determinants of Health     Financial Resource Strain: Low Risk     Difficulty of Paying Living Expenses: Not hard at all   Food Insecurity: No Food Insecurity    Worried About [address] in the Last Year: Never true    [address] in the Last Year: Never true   Transportation Needs: Unknown    Lack of Transportation (Medical): Not on file    Lack of Transportation (Non-Medical): No   Physical Activity: Not on file   Stress: Not on file   Social Connections: Not on file   Intimate Partner Violence: Not on file   Housing Stability: Unknown    Unable to Pay for Housing in the Last Year: Not on file    Number of Places Lived in the Last Year: Not on file    Unstable Housing in the Last Year: No       Review of Systems  A comprehensive review of systems was negative except for what was noted in the HPI. Physical Exam   Constitutional: He is oriented to person, place, and time. He appears well-developed and well-nourished. No distress. HENT:   Head: Normocephalic and atraumatic. Mouth/Throat: Uvula is midline, oropharynx is clear and moist and mucous membranes are normal.   Eyes: Conjunctivae and EOM are normal. Pupils are equal, round, and reactive to light. Neck: Trachea normal and normal range of motion. Neck supple. No JVD present. Carotid bruit is not present. No mass and no thyromegaly present. Cardiovascular: Normal rate, regular rhythm, normal heart sounds and intact distal pulses. Exam reveals no gallop and no friction rub. No murmur heard. Pulmonary/Chest: Effort normal and breath sounds normal. No respiratory distress. He has no wheezes. He has no rales.    Abdominal: Soft. Normal aorta and bowel sounds are normal. He exhibits no distension and no mass. There is no hepatosplenomegaly. No tenderness. Musculoskeletal: He exhibits no edema and no tenderness. Neurological: He is alert and oriented to person, place, and time. He has normal strength. No cranial nerve deficit or sensory deficit. Coordination and gait normal.   Skin: Skin is warm and dry. No rash noted. No erythema. Psychiatric: He has a normal mood and affect. His behavior is normal.     EKG Interpretation:     Lab Review not applicable        Assessment:     Salud Mao was seen today for pre-op exam.    Diagnoses and all orders for this visit:    Ulnar nerve impingement, left  Preop examination  SURGERY DELAYED UNTIL LABS REVIEWED BY DR Karol Ray OR ASSOCIATE  Tobacco abuse  -     varenicline (CHANTIX) 0.5 MG tablet; Take 1-2 tablets by mouth See Admin Instructions 0.5mg DAILY for 3 days followed by 0.5mg TWICE DAILY for 4 days followed by 1mg TWICE DAILY  -     varenicline (CHANTIX) 1 MG tablet; Take 1 tablet by mouth 2 times daily    Bipolar 1 disorder, mixed, full remission (HCC)  -     lamoTRIgine (LAMICTAL) 200 MG tablet; TAKE 2 TABLETS BY MOUTH DAILY AT DINNER    Social anxiety disorder  Controlled substances monitoring: possible medication side effects, risk of tolerance and/or dependence, and alternative treatments discussed, no signs of potential drug abuse or diversion identified, and OARRS report reviewed today- activity consistent with treatment plan. clonazePAM (KLONOPIN) 1 MG tablet; Take 1 tablet by mouth 2 times daily for 30 days. Fill 2/7/23 Max Daily Amount: 2 mg  UDS TO BE COMPLETED AT NEXT VISIT  FOLLOW UP IN THREE MONTHS    Partial symptomatic epilepsy with complex partial seizures, not intractable, without status epilepticus (HCC)  clonazePAM (KLONOPIN) 2 MG tablet; Take 1 tablet by mouth 2 times daily as needed (SEIZURES) for up to 30 days.  Fill 2/7/23 TAKE TO ABORT SEIZURES Max Daily Amount: 4 mg    Extrapyramidal movement disorder, drug-induced  -     trihexyphenidyl (ARTANE) 2 MG tablet; Take 1 tablet by mouth 2 times daily    Gastroesophageal reflux disease, unspecified whether esophagitis present  -     omeprazole (PRILOSEC) 20 MG delayed release capsule; Take 1 capsule by mouth every morning (before breakfast)     38 y.o. patient with planned surgery as above. Known risk factors for perioperative complications: Tobacco abuse  Current medications which may produce withdrawal symptoms if withheld perioperatively: KLONOPIN      Plan:     1. Preoperative workup as follows: ECG-   LABS ORDERD BY ORTHOPEDIST  BMP- COAG- CBC- UA TO BE REVIEWED BY DR OSBORNE OR ASSOCIATE  2. Change in medication regimen before surgery: Discontinue SUPPLEMENT- NSIADS ONE WEEK PRIOR TO SURGERY   3. Prophylaxis for cardiac events with perioperative beta-blockers: Not indicated  ACC/AHA indications for pre-operative beta-blocker use:    Vascular surgery with history of postitive stress test  Intermediate or high risk surgery with history of CAD   Intermediate or high risk surgery with multiple clinical predictors of CAD- 2 of the following: history of compensated or prior heart failure, history of cerebrovascular disease, DM, or renal insufficiency    Routine administration of higher-dose, long-acting metoprolol in beta-blocker-naïve patients on the day of surgery, and in the absence of dose titration is associated with an overall increase in mortality. Beta-blockers should be started days to weeks prior to surgery and titrated to pulse < 70.  4. Deep vein thrombosis prophylaxis: regimen to be chosen by surgical team  5.  Surgery should be delayed until [address] [place] PAPERWORK TO [address] [facility]

## 2023-02-08 NOTE — TELEPHONE ENCOUNTER
APPROVED  CPT: 72684  AUTH: [id number]  DATE RANGE: 2/21/2023 TO 5/21/2023  INSURANCE: [facility]  NOTE: DOC SCANNED

## 2023-02-09 ENCOUNTER — HOSPITAL ENCOUNTER (OUTPATIENT)
Age: 43
Discharge: HOME | End: 2023-02-09
Payer: COMMERCIAL

## 2023-02-09 DIAGNOSIS — Z01.818 PRE-OPERATIVE CLEARANCE: ICD-10-CM

## 2023-02-09 LAB
A/G RATIO: 1.5 (ref 1.1–2.2)
ALBUMIN SERPL-MCNC: 4.4 G/DL (ref 3.4–5)
ALP BLD-CCNC: 71 U/L (ref 40–129)
ALT SERPL-CCNC: 25 U/L (ref 10–40)
ANION GAP SERPL CALCULATED.3IONS-SCNC: 12 MMOL/L (ref 3–16)
AST SERPL-CCNC: 20 U/L (ref 15–37)
BASOPHILS ABSOLUTE: 0.1 K/UL (ref 0–0.2)
BASOPHILS RELATIVE PERCENT: 1 %
BILIRUB SERPL-MCNC: 0.3 MG/DL (ref 0–1)
BILIRUBIN URINE: NEGATIVE
BLOOD, URINE: NEGATIVE
BUN BLDV-MCNC: 17 MG/DL (ref 7–20)
CALCIUM SERPL-MCNC: 9.3 MG/DL (ref 8.3–10.6)
CHLORIDE BLD-SCNC: 99 MMOL/L (ref 99–110)
CLARITY: CLEAR
CO2: 24 MMOL/L (ref 21–32)
COLOR: YELLOW
CREAT SERPL-MCNC: 0.7 MG/DL (ref 0.9–1.3)
EKG ATRIAL RATE: 69 BPM
EKG DIAGNOSIS: NORMAL
EKG P AXIS: 39 DEGREES
EKG P-R INTERVAL: 140 MS
EKG Q-T INTERVAL: 408 MS
EKG QRS DURATION: 124 MS
EKG QTC CALCULATION (BAZETT): 437 MS
EKG R AXIS: 47 DEGREES
EKG T AXIS: 36 DEGREES
EKG VENTRICULAR RATE: 69 BPM
EOSINOPHILS ABSOLUTE: 0.1 K/UL (ref 0–0.6)
EOSINOPHILS RELATIVE PERCENT: 2 %
GFR SERPL CREATININE-BSD FRML MDRD: >60 ML/MIN/{1.73_M2}
GLUCOSE BLD-MCNC: 106 MG/DL (ref 70–99)
GLUCOSE URINE: NEGATIVE MG/DL
HCT VFR BLD CALC: 41.6 % (ref 40.5–52.5)
HEMOGLOBIN: 13.9 G/DL (ref 13.5–17.5)
INR BLD: 1.06 (ref 0.87–1.14)
KETONES, URINE: NEGATIVE MG/DL
LEUKOCYTE ESTERASE, URINE: NEGATIVE
LYMPHOCYTES ABSOLUTE: 2.3 K/UL (ref 1–5.1)
LYMPHOCYTES RELATIVE PERCENT: 33.9 %
MCH RBC QN AUTO: 32.1 PG (ref 26–34)
MCHC RBC AUTO-ENTMCNC: 33.4 G/DL (ref 31–36)
MCV RBC AUTO: 96.1 FL (ref 80–100)
MICROSCOPIC EXAMINATION: NORMAL
MONOCYTES ABSOLUTE: 0.7 K/UL (ref 0–1.3)
MONOCYTES RELATIVE PERCENT: 10.7 %
NEUTROPHILS ABSOLUTE: 3.6 K/UL (ref 1.7–7.7)
NEUTROPHILS RELATIVE PERCENT: 52.4 %
NITRITE, URINE: NEGATIVE
PDW BLD-RTO: 13.5 % (ref 12.4–15.4)
PH UA: 7 (ref 5–8)
PLATELET # BLD: 326 K/UL (ref 135–450)
PMV BLD AUTO: 7.4 FL (ref 5–10.5)
POTASSIUM SERPL-SCNC: 4.5 MMOL/L (ref 3.5–5.1)
PROTEIN UA: NEGATIVE MG/DL
PROTHROMBIN TIME: 13.6 SEC (ref 11.7–14.5)
RBC # BLD: 4.33 M/UL (ref 4.2–5.9)
SODIUM BLD-SCNC: 135 MMOL/L (ref 136–145)
SPECIFIC GRAVITY UA: 1.01 (ref 1–1.03)
TOTAL PROTEIN: 7.3 G/DL (ref 6.4–8.2)
URINE TYPE: NORMAL
UROBILINOGEN, URINE: 0.2 E.U./DL
WBC # BLD: 6.8 K/UL (ref 4–11)

## 2023-02-09 PROCEDURE — 87641 MR-STAPH DNA AMP PROBE: CPT

## 2023-02-09 PROCEDURE — 83036 HEMOGLOBIN GLYCOSYLATED A1C: CPT

## 2023-02-09 PROCEDURE — 36415 COLL VENOUS BLD VENIPUNCTURE: CPT

## 2023-02-09 PROCEDURE — 81003 URINALYSIS AUTO W/O SCOPE: CPT

## 2023-02-09 PROCEDURE — 85610 PROTHROMBIN TIME: CPT

## 2023-02-09 PROCEDURE — 80053 COMPREHEN METABOLIC PANEL: CPT

## 2023-02-09 PROCEDURE — 93005 ELECTROCARDIOGRAM TRACING: CPT | Performed by: ORTHOPAEDIC SURGERY

## 2023-02-09 PROCEDURE — 85025 COMPLETE CBC W/AUTO DIFF WBC: CPT

## 2023-02-10 LAB
ESTIMATED AVERAGE GLUCOSE: 111.2 MG/DL
HBA1C MFR BLD: 5.5 %
MRSA SCREEN RT-PCR: NORMAL

## 2023-02-20 ENCOUNTER — OFFICE VISIT (OUTPATIENT)
Dept: ORTHOPEDIC SURGERY | Age: 43
End: 2023-02-20
Payer: COMMERCIAL

## 2023-02-20 VITALS — BODY MASS INDEX: 33.65 KG/M2 | WEIGHT: 222 LBS | HEIGHT: 68 IN

## 2023-02-20 DIAGNOSIS — M77.12 LATERAL EPICONDYLITIS OF LEFT ELBOW: ICD-10-CM

## 2023-02-20 DIAGNOSIS — M25.522 LEFT ELBOW PAIN: ICD-10-CM

## 2023-02-20 DIAGNOSIS — G56.22 ENTRAPMENT OF LEFT ULNAR NERVE: Primary | ICD-10-CM

## 2023-02-20 PROCEDURE — G8484 FLU IMMUNIZE NO ADMIN: HCPCS | Performed by: ORTHOPAEDIC SURGERY

## 2023-02-20 PROCEDURE — G8417 CALC BMI ABV UP PARAM F/U: HCPCS | Performed by: ORTHOPAEDIC SURGERY

## 2023-02-20 PROCEDURE — G8427 DOCREV CUR MEDS BY ELIG CLIN: HCPCS | Performed by: ORTHOPAEDIC SURGERY

## 2023-02-20 PROCEDURE — 99214 OFFICE O/P EST MOD 30 MIN: CPT | Performed by: ORTHOPAEDIC SURGERY

## 2023-02-20 PROCEDURE — 4004F PT TOBACCO SCREEN RCVD TLK: CPT | Performed by: ORTHOPAEDIC SURGERY

## 2023-02-20 RX ORDER — HYDROCODONE BITARTRATE AND ACETAMINOPHEN 5; 325 MG/1; MG/1
1 TABLET ORAL EVERY 6 HOURS PRN
Qty: 12 TABLET | Refills: 0 | Status: SHIPPED | OUTPATIENT
Start: 2023-02-20 | End: 2023-02-25

## 2023-02-20 RX ORDER — HYDROCODONE BITARTRATE AND ACETAMINOPHEN 5; 325 MG/1; MG/1
1 TABLET ORAL EVERY 6 HOURS PRN
Qty: 12 TABLET | Refills: 0 | Status: SHIPPED | OUTPATIENT
Start: 2023-02-20 | End: 2023-02-20

## 2023-02-20 RX ORDER — ASPIRIN 81 MG/1
81 TABLET ORAL 2 TIMES DAILY
Qty: 28 TABLET | Refills: 0 | Status: CANCELLED | OUTPATIENT
Start: 2023-02-20 | End: 2023-03-06

## 2023-02-20 RX ORDER — NAPROXEN 500 MG/1
500 TABLET ORAL 2 TIMES DAILY WITH MEALS
Qty: 28 TABLET | Refills: 0 | Status: SHIPPED | OUTPATIENT
Start: 2023-02-20 | End: 2023-02-20

## 2023-02-20 RX ORDER — SENNOSIDES 8.6 MG
1 TABLET ORAL 2 TIMES DAILY
Qty: 14 TABLET | Refills: 0 | Status: SHIPPED | OUTPATIENT
Start: 2023-02-20 | End: 2023-02-20

## 2023-02-20 RX ORDER — CHLORHEXIDINE GLUCONATE 213 G/1000ML
SOLUTION TOPICAL
Qty: 1 EACH | Refills: 0 | Status: ON HOLD | OUTPATIENT
Start: 2023-02-20 | End: 2023-02-21 | Stop reason: HOSPADM

## 2023-02-20 RX ORDER — NAPROXEN 500 MG/1
500 TABLET ORAL 2 TIMES DAILY WITH MEALS
Qty: 28 TABLET | Refills: 0 | Status: SHIPPED | OUTPATIENT
Start: 2023-02-20 | End: 2023-03-06

## 2023-02-20 RX ORDER — SENNOSIDES 8.6 MG
1 TABLET ORAL 2 TIMES DAILY
Qty: 14 TABLET | Refills: 0 | Status: SHIPPED | OUTPATIENT
Start: 2023-02-20 | End: 2023-02-27

## 2023-02-20 RX ORDER — TRAMADOL HYDROCHLORIDE 50 MG/1
50 TABLET ORAL EVERY 6 HOURS PRN
Qty: 12 TABLET | Refills: 0 | Status: CANCELLED | OUTPATIENT
Start: 2023-02-20 | End: 2023-02-25

## 2023-02-20 RX ORDER — CHLORHEXIDINE GLUCONATE 213 G/1000ML
SOLUTION TOPICAL
Qty: 1 EACH | Refills: 0 | Status: SHIPPED | OUTPATIENT
Start: 2023-02-20 | End: 2023-02-20

## 2023-02-20 NOTE — LETTER
PRESCRIPTION FOR PHYSICAL THERAPY      Patient Name: Juan Mixon MRN: [id number]  DOS: 2/20/2023     Diagnosis:   1. Entrapment of left ulnar nerve    2. Lateral epicondylitis of left elbow    3. Left elbow pain                                                     Surgical Procedure:   LEFT CUBITAL TUNNEL RELEASE   Surgical Date: 02/21/2023    Goal:  [x]Decrease Pain and/or Swelling [x]Increase ROM and/or Flexibility     [x]Increase Function   [x]Increase Strength and/or Endurance   []Other     Evaluation:  [x]Evaluation and Treatment []KT-1000 []Isokinetic Exam []Preoperative Eval      Recommended Modalities:  [x]Modalities of Choice      []HCVS            []Electrical Stimulation     [] Remove Dressing  []Ultrasound        []TENS/TNS    [] Lumbar Traction           [] Cervical Traction  []Phonophoresis         []Hot Pack/Cold Pack   []PT Treatment, Unlisted []Other:    Therapeutic Exercises:    []Isometrics    []Range of Motion []Progressive Exer. []Balance Coordination   []Flexibility  []ROM Limited  []Total Hip Replacement   []Passive  []ROM Full   []Total Knee Replacement   []Active Assisted    []Shoulder Impingement Prog  []Active   []Tennis Elbow Program   []Capsular Shift Regular        []Isokinetics                    []Spine Program   []Straight Leg Raises  [] Gait    []Fixation    [] Supine    [] Running       [] Extension    [] Prone   [] Throwing   [] Stabilization   [] AB    [] [facility]    [] AD      [] Spine Eval   [] Cervical Eval  [] Conditioning   [] Lumbar    [] Stationary Bike   [] Lumbar Exer.   [] Stairmaster   [] Functional Cap   [] [place] Track   [] Return to work   [] Treadmill  []Other :     [] Aquatic Prog.          Treatment Program:  Frequency: [] 1x  [x] 2x  [] 3x  [] 4x  [] 5x week  Duration: [x] 1  [] 2  [] 3  [] 4  [] 5 month   Weight Bearing: [] Non  [] 1/4  [] 1/2  [] 3/4  [] Full  ROM: [] Restricted  [] Full  [] Follow established:        [] Other:

## 2023-02-20 NOTE — LETTER
[address]  Phone: [phone]  Fax: [phone]    Arvind Sesay MD    February 20, 2023     Imer Hyde [address]    Patient: Deonna Bee   MR Number: [id number]   YOB: 1980   Date of Visit: 2/20/2023       Dear Edis Barker: Thank you for referring John Herrera to me for evaluation/treatment. Below are the relevant portions of my assessment and plan of care. If you have questions, please do not hesitate to call me. I look forward to following Asiya Li along with you.     Sincerely,      Arvind Sesay MD

## 2023-02-20 NOTE — PROGRESS NOTES
Alexis Hoff [address] [facility]  Office Visit    Chief Complaint    Follow-up (PRE-SURGERY APPOINTMENT L CUBITAL TUNNEL RELEASE )      History of Present Illness:  Radu Spears is a 38 y.o. male who presents for  follow-up of left elbow pain. Pain assessment as described below and reviewed today with the patient. He recently underwent an EMG of his left upper extremity on 12/01/2022 which confirmed left ulnar neuropathy at the level of the cubital tunnel. He reports having left sided elbow pain for the past 6+ months now. His symptoms have somewhat worsened since his last visit especially 4th and 5th finger numbness and parasthesias. The patient denies any setbacks. He works with horses. He is here today for preoperative visit with surgery tomorrow       Pain Assessment  Location of Pain: Elbow  Location Modifiers: Left  Severity of Pain: 8  Quality of Pain: Sharp, Throbbing  Duration of Pain: Persistent  Frequency of Pain: Constant  Aggravating Factors: Bending, Stretching, Straightening, Exercise  Limiting Behavior: Yes  Relieving Factors: Rest, Ice  Result of Injury: No  Work-Related Injury: No  Are there other pain locations you wish to document?: No    Past Medical History:   Diagnosis Date    Abscess of elbow 2012    right    Alcoholism /alcohol abuse 01/01/2005    Stopped 2014    Arthritis     Durham's esophagus     Bipolar 1 disorder, mixed, full remission ([facility] 01/01/2006    Contusion of wrist, right 04/01/2016    Dislocation of part of left shoulder girdle 01/01/1985    arm pulled out of socket - no medical visit at the time    Erosive gastropathy 03/23/2018    antrum    Essential hypertension 01/01/2006    lost 100 lbs in 1 year    GERD (gastroesophageal reflux disease)     Head injury due to trauma     Frontal lobe trauma causing seizures and shakes.  No workup till after seizures/shakes appeared    Major depression in complete remission ([facility] 01/01/2006    while on medicine    Osteoarthritis of left AC (acromioclavicular) joint     Osteomyelitis ([facility] 01/01/2003    right tibia    Partial complex seizure disorder without intractable epilepsy ([facility] 01/01/2006    fist clenching is a typical pre-seizure activity for him    Psychiatric diagnosis     Saw Dr. Gloria Paulson  [phone]    Social anxiety disorder 01/01/2006    moderate        Past Surgical History:   Procedure Laterality Date    APPENDECTOMY  05/22/2012    lap appy    ELBOW DEBRIDEMENT Right     MRSA    KNEE SURGERY Right     debridement osteomyelitis    MOUTH SURGERY Left 2019    Lower molar removal    UPPER GASTROINTESTINAL ENDOSCOPY  12/16/2010    UPPER GASTROINTESTINAL ENDOSCOPY  03/23/2018    gastric and esophageal bx, erosive gastropathy    WISDOM TOOTH EXTRACTION Bilateral 1998    #4 removed       Family History   Problem Relation Age of Onset    Diabetes Mother     Rheum Arthritis Mother     Depression Mother     Hypertension Mother     High Cholesterol Mother     Osteoarthritis Mother         Mother had a knee replacement x 2    Obesity Mother     Other Mother         varicose veins    Diabetes Father     High Blood Pressure Father     High Cholesterol Father     Other Father         interstitial cystitis, DDD C spine    Heart Disease Father         TACHYCARDIA    Mental Illness Brother         schizophrenia - dissociated    Obesity Brother     Diabetes Brother     Liver Disease Brother         fatty liver    High Cholesterol Brother     Hypothyroidism Brother     Obesity Brother     Other Brother         ervin, appendicitis    Glaucoma Brother         occular htn    Other Brother         tonsilitis, gall bladder    Stroke Maternal Grandmother     Diabetes Maternal Grandfather     Coronary Art Dis Maternal Grandfather     Heart Surgery Maternal Grandfather 54        CABG    Other Maternal Grandfather         PAD with amputations    Diabetes Paternal Grandmother     Heart Surgery Paternal Grandmother         aortic valve replacement transvascular    Glaucoma Paternal Great Grandmother     Macular Degen Paternal Great Grandmother     Dementia Paternal Great Grandmother        Social History     Socioeconomic History    Marital status: Single     Spouse name: None    Number of children: 0    Years of education: 13    Highest education level: None   Occupational History    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 Occupation: [employer]. Employer: [employer]     Comment: 41 hr/wk   Tobacco Use    Smoking status: Every Day     Packs/day: 1.00     Types: Cigarettes     Last attempt to quit: 3/6/2018     Years since quittin.9    Smokeless tobacco: Never    Tobacco comments:     social.  Have almost quit (nicorette) 3/13/17. Restarted 18 < 1 PPD. .19. 1-1.5 PPD. Plans on quit. 20. He is at 1 pack/day. 21. 1/2 PPD. 1.5 PPD. 22.5/3/21. 1 PPD. 22   Vaping Use    Vaping Use: Never used   Substance and Sexual Activity    Alcohol use: Yes     Alcohol/week: 6.0 standard drinks     Types: 6 Cans of beer per week     Comment: Occasional 3-4 beers. Has not drank at all for 4 weeks. doesn't want to lose GF. 6 pack/nite. Tried AA but it was at [facility]. Drug use: Not Currently     Types: Marijuana Dietra Due)    Sexual activity: Not Currently   Social History Narrative    Only exercise is work. 17. 9/. 4/. 7/. 10/18. 1/. /. 19.19. Less time shoeing horse. Only exercises work. 20. No exercise. 21. 21. Plans walking trails. 21. Not currently hiking. 22. None with injury. WORK.5/3/22. No exercise. 22.      Social Determinants of Health     Financial Resource Strain: Low Risk     Difficulty of Paying Living Expenses: Not hard at all   Food Insecurity: No Food Insecurity Worried About [facility] in the Last Year: Never true    [facility] in the Last Year: Never true   Transportation Needs: Unknown    Lack of Transportation (Non-Medical): No   Housing Stability: Unknown    Unstable Housing in the Last Year: No       Current Outpatient Medications   Medication Sig Dispense Refill    chlorhexidine (HIBICLENS) 4 % external liquid Use as body wash only night before surgery 1 each 0    HYDROcodone-acetaminophen (NORCO) 5-325 MG per tablet Take 1 tablet by mouth every 6 hours as needed for Pain for up to 5 days. Max Daily Amount: 4 tablets 12 tablet 0    naproxen (NAPROSYN) 500 MG tablet Take 1 tablet by mouth 2 times daily (with meals) for 14 days 28 tablet 0    senna (SENOKOT) 8.6 MG TABS tablet Take 1 tablet by mouth 2 times daily for 7 days 14 tablet 0    varenicline (CHANTIX) 0.5 MG tablet Take 1-2 tablets by mouth See Admin Instructions 0.5mg DAILY for 3 days followed by 0.5mg TWICE DAILY for 4 days followed by 1mg TWICE DAILY 57 tablet 0    varenicline (CHANTIX) 1 MG tablet Take 1 tablet by mouth 2 times daily 180 tablet 1    clonazePAM (KLONOPIN) 2 MG tablet Take 1 tablet by mouth 2 times daily as needed (SEIZURES) for up to 30 days. Fill 2/7/23 TAKE TO ABORT SEIZURES Max Daily Amount: 4 mg 30 tablet 0    clonazePAM (KLONOPIN) 1 MG tablet Take 1 tablet by mouth 2 times daily for 30 days. Fill 2/7/23 Max Daily Amount: 2 mg 60 tablet 0    lamoTRIgine (LAMICTAL) 200 MG tablet TAKE 2 TABLETS BY MOUTH DAILY AT DINNER 60 tablet 0    trihexyphenidyl (ARTANE) 2 MG tablet Take 1 tablet by mouth 2 times daily 180 tablet 1    omeprazole (PRILOSEC) 20 MG delayed release capsule Take 1 capsule by mouth every morning (before breakfast) 90 capsule 3    atorvastatin (LIPITOR) 10 MG tablet Take 1 tablet by mouth in the morning.  30 tablet 2    diclofenac (VOLTAREN) 75 MG EC tablet Take 1 tablet by mouth 2 times daily for 7 days 14 tablet 0    Ketamine HCl POWD       FOLATE 400 MCG tablet Take 2 tablets by mouth daily 180 tablet 3    gabapentin (NEURONTIN) 400 MG capsule TAKE FOUR CAPSULES BY MOUTH NIGHTLY FOR SLEEP. 120 capsule 2    traZODone (DESYREL) 50 MG tablet Take 1/2-1 tablets by mouth nightly 30 tablet 5    Diclofenac Sodium POWD Apply 1-2 g topically 3 times daily as needed (As needed for pain) Ketamine 5% Gabapentin 5% Cyclobenzaprine 2% Diclofenac 3% Lidocaine 3% 100 g 0    ziprasidone (GEODON) 40 MG capsule Take 1 capsule by mouth daily Take with 60 mg at dinner 90 capsule 1    ziprasidone (GEODON) 60 MG capsule Take 1 capsule by mouth in the morning and 1 capsule in the evening. Take with meals. Take a 60 mg with a 40 mg at dinner. 180 capsule 1    amLODIPine (NORVASC) 2.5 MG tablet TAKE 1 TABLET DAILY 90 tablet 1    nadolol (CORGARD) 20 MG tablet 1/2 TABLET DAILY BY MOUTH FOR HIGH BLOOD PRESSURE 45 tablet 2    Cholecalciferol (VITAMIN D3) 125 MCG (5000 UT) CAPS Indications: Vitamin D Deficiency Take 1 capsule daily by mouth 5 days/week i.e. skip Monday and Thursday 22 capsule 11    diclofenac sodium (VOLTAREN) 1 % GEL Apply 4 g topically 4 times daily as needed for Pain      fexofenadine (ALLEGRA) 180 MG tablet Take 1 tablet by mouth daily 30 tablet 11    acetaminophen (TYLENOL) 500 MG tablet Take 2 tablets by mouth 3 times daily as needed for Pain 180 tablet 0    GLUCOSAMINE-CHONDROITIN ER PO Take by mouth 2 times daily      guaiFENesin (MUCINEX) 600 MG extended release tablet Take 600 mg by mouth daily       Melatonin 5 MG TABS tablet Take 1 tablet by mouth nightly      therapeutic multivitamin-minerals (THERAGRAN-M) tablet Take 1 tablet by mouth daily. No current facility-administered medications for this visit.        Allergies   Allergen Reactions    Banana Hives and Itching    Iodides      Other reaction(s): GI Intolerance    Ancef [Cefazolin Sodium] Rash    Iodine Nausea And Vomiting     WITH IODINATED CONTRAST    Shellfish-Derived Products Nausea And Vomiting Review of Systems:  A 14 point review of systems available in the scanned medical record as documented by the patient. Today's review pertinent items are noted in HPI. Vital Signs:   Ht 5' 8\" (1.727 m)   Wt 222 lb (100.7 kg)   BMI 33.75 kg/m²     General Exam:    Neuro: Alert & Oriented x 3,  normal,  no focal deficits noted. Normal mood & affect. Eyes: Sclera clear  Ears: Normal external ear  Mouth:  No perioral lesions  Pulm: Respirations unlabored and regular   Skin: Warm, well perfused      Left Elbow Examination:    Inspection:  No skin lesions, no deformity, no swelling    Palpation:  No tenderness to palpation over the lateral epicondyle, medial epicondyle, olecranon. No tenderness to palpation over the mobile wad, flexor pronator mass, biceps, triceps. No tenderness over the ulnar collateral ligament and the lateral collateral ligament complex    Range of Motion: full extension and able to touch her shoulders in flexion, normal supination and pronation with the elbow at 90°    Strength:  Normal strength of the wrist extensors and flexors, normal strength of the biceps and triceps    Stability:  No instability noted to varus and valgus stress, no rotatory instability with pushup test    Neurovascular: Palpable radial pulse, normal sensation in the median, , radial nerve distributions, 1/2 sensation through the ulnar nerve distribution 4/5 strength in finger abduction    Special Tests:  Positive resisted wrist extension, positive Tinel's at the cubital tunnel. Radiology:     No new imaging was obtained today      EMG Left Upper Extremity 12/01/2022 reviewed today in the office shows:  Left ulnar nerve compression syndrome across the elbow. No evidence of a radiculopathy, peripheral neuropathy, or carpal tunnel syndrome. Assessment: Patient is a 38 y.o. male with left sided ulnar nerve compression syndrome proven on EMG/NCS.      Impression:  Visit Diagnoses         Codes Entrapment of left ulnar nerve    -  Primary G56.22    Lateral epicondylitis of left elbow     M77.12    Left elbow pain     M25.522            Office Procedures:  Orders Placed This Encounter   Medications    chlorhexidine (HIBICLENS) 4 % external liquid     Sig: Use as body wash only night before surgery     Dispense:  1 each     Refill:  0    HYDROcodone-acetaminophen (NORCO) 5-325 MG per tablet     Sig: Take 1 tablet by mouth every 6 hours as needed for Pain for up to 5 days. Max Daily Amount: 4 tablets     Dispense:  12 tablet     Refill:  0     Reduce doses taken as pain becomes manageable    naproxen (NAPROSYN) 500 MG tablet     Sig: Take 1 tablet by mouth 2 times daily (with meals) for 14 days     Dispense:  28 tablet     Refill:  0    senna (SENOKOT) 8.6 MG TABS tablet     Sig: Take 1 tablet by mouth 2 times daily for 7 days     Dispense:  14 tablet     Refill:  0     No orders of the defined types were placed in this encounter. Plan:  Pertinent imaging was reviewed. The etiology, natural history, and treatment options for the disorder were discussed. The roles of activity modification, antiinflammatory medicine, injections, bracing, physical therapy, and surgical interventions were all described to the patient and questions were answered. We believe patient is a candidate for LEFT cubital tunnel release surgery and possible subcutaneous ulnar nerve transposition. Risks, benefits, advantages, disadvantages and potential complications as well as the anticipated postoperative course were discussed. I expect the patient to be in a sling for 7 to 14 days until wound is healed, but is permitted immediate ROM. I counseled about the expected postoperative bruising. I also emphasized the need for a elbow/forearm strengthening program with physical therapy for 6-9 weeks   No heavy lifting for 4 to 6 weeks. No driving for 7 to 10 days. Return to work may vary but may take 6 to 9 weeks.  The patient agreed to pursue this treatment option. All questions were addressed to their satisfaction. We will schedule at the next available elective time time. We further discussed the anticipated risks and benefits surrounding surgery and expected perioperative course. no latex allergy  no adhesive allergy  yes - (iodine) medication allergy, see list  no OCP or hormonal treatment  nopersonal history of diabetes, or blood pressure issues  no personal or family history of bleeding  no personal or family history of DVT/PE  yes - (upset stomach with NSAIDS - on omeprazole) personal or family history of intolerance no NSAIDS or history of GI ulcers  no  personal or family history of problems with Anaesthesia     All the patient's questions were answered while in the clinic. The patient is understanding of all instructions and agrees with the plan. Camilla Ortiz MD [facility]    Orthopaedic Surgeon, Clinical Fellow  John Morton   On behalf of      Approximately 30 minutes was spent on patient education and coordinating care. Follow up in: No follow-ups on file. Sincerely,        Polo Soriano MD [facility]   [address] [facility]  Email: [email or web address]  Office: [phone]    The encounter with Cori Skinner was carried out by myself, Dr Farrah Parker, who personally examined the patient and reviewed the plan. This dictation was performed with a verbal recognition program (DRAGON) and it was checked for errors. It is possible that there are still dictated errors within this office note. If so, please bring any errors to my attention for an addendum. All efforts were made to ensure that this office note is accurate.        02/20/23  8:48 AM

## 2023-02-21 ENCOUNTER — ANESTHESIA (OUTPATIENT)
Dept: OPERATING ROOM | Age: 43
End: 2023-02-21
Payer: COMMERCIAL

## 2023-02-21 ENCOUNTER — TELEPHONE (OUTPATIENT)
Dept: ORTHOPEDIC SURGERY | Age: 43
End: 2023-02-21

## 2023-02-21 ENCOUNTER — ANESTHESIA EVENT (OUTPATIENT)
Dept: OPERATING ROOM | Age: 43
End: 2023-02-21
Payer: COMMERCIAL

## 2023-02-21 ENCOUNTER — HOSPITAL ENCOUNTER (OUTPATIENT)
Age: 43
Setting detail: OUTPATIENT SURGERY
Discharge: HOME | End: 2023-02-21
Attending: ORTHOPAEDIC SURGERY | Admitting: ORTHOPAEDIC SURGERY
Payer: COMMERCIAL

## 2023-02-21 VITALS
HEIGHT: 68 IN | OXYGEN SATURATION: 93 % | TEMPERATURE: 96.9 F | DIASTOLIC BLOOD PRESSURE: 82 MMHG | WEIGHT: 225 LBS | HEART RATE: 90 BPM | BODY MASS INDEX: 34.1 KG/M2 | RESPIRATION RATE: 13 BRPM | SYSTOLIC BLOOD PRESSURE: 102 MMHG

## 2023-02-21 PROCEDURE — 7100000011 HC PHASE II RECOVERY - ADDTL 15 MIN: Performed by: ORTHOPAEDIC SURGERY

## 2023-02-21 PROCEDURE — 6360000002 HC RX W HCPCS: Performed by: ORTHOPAEDIC SURGERY

## 2023-02-21 PROCEDURE — 3600000013 HC SURGERY LEVEL 3 ADDTL 15MIN: Performed by: ORTHOPAEDIC SURGERY

## 2023-02-21 PROCEDURE — 3700000000 HC ANESTHESIA ATTENDED CARE: Performed by: ORTHOPAEDIC SURGERY

## 2023-02-21 PROCEDURE — 2580000003 HC RX 258: Performed by: NURSE ANESTHETIST, CERTIFIED REGISTERED

## 2023-02-21 PROCEDURE — 3600000002 HC SURGERY LEVEL 2 BASE: Performed by: ORTHOPAEDIC SURGERY

## 2023-02-21 PROCEDURE — 6370000000 HC RX 637 (ALT 250 FOR IP): Performed by: ANESTHESIOLOGY

## 2023-02-21 PROCEDURE — 3600000003 HC SURGERY LEVEL 3 BASE: Performed by: ORTHOPAEDIC SURGERY

## 2023-02-21 PROCEDURE — 6360000002 HC RX W HCPCS: Performed by: ANESTHESIOLOGY

## 2023-02-21 PROCEDURE — 3600000012 HC SURGERY LEVEL 2 ADDTL 15MIN: Performed by: ORTHOPAEDIC SURGERY

## 2023-02-21 PROCEDURE — 7100000010 HC PHASE II RECOVERY - FIRST 15 MIN: Performed by: ORTHOPAEDIC SURGERY

## 2023-02-21 PROCEDURE — 2500000003 HC RX 250 WO HCPCS: Performed by: NURSE ANESTHETIST, CERTIFIED REGISTERED

## 2023-02-21 PROCEDURE — 6360000002 HC RX W HCPCS: Performed by: NURSE ANESTHETIST, CERTIFIED REGISTERED

## 2023-02-21 PROCEDURE — 2709999900 HC NON-CHARGEABLE SUPPLY: Performed by: ORTHOPAEDIC SURGERY

## 2023-02-21 PROCEDURE — 3700000001 HC ADD 15 MINUTES (ANESTHESIA): Performed by: ORTHOPAEDIC SURGERY

## 2023-02-21 PROCEDURE — 7100000001 HC PACU RECOVERY - ADDTL 15 MIN: Performed by: ORTHOPAEDIC SURGERY

## 2023-02-21 PROCEDURE — 2580000003 HC RX 258: Performed by: ANESTHESIOLOGY

## 2023-02-21 PROCEDURE — 2580000003 HC RX 258: Performed by: ORTHOPAEDIC SURGERY

## 2023-02-21 PROCEDURE — 2500000003 HC RX 250 WO HCPCS: Performed by: ORTHOPAEDIC SURGERY

## 2023-02-21 PROCEDURE — 7100000000 HC PACU RECOVERY - FIRST 15 MIN: Performed by: ORTHOPAEDIC SURGERY

## 2023-02-21 RX ORDER — FENTANYL CITRATE 50 UG/ML
INJECTION, SOLUTION INTRAMUSCULAR; INTRAVENOUS PRN
Status: DISCONTINUED | OUTPATIENT
Start: 2023-02-21 | End: 2023-02-21 | Stop reason: SDUPTHER

## 2023-02-21 RX ORDER — BUPIVACAINE HYDROCHLORIDE AND EPINEPHRINE 5; 5 MG/ML; UG/ML
INJECTION, SOLUTION PERINEURAL
Status: COMPLETED | OUTPATIENT
Start: 2023-02-21 | End: 2023-02-21

## 2023-02-21 RX ORDER — SODIUM CHLORIDE 9 MG/ML
INJECTION, SOLUTION INTRAVENOUS CONTINUOUS PRN
Status: DISCONTINUED | OUTPATIENT
Start: 2023-02-21 | End: 2023-02-21 | Stop reason: SDUPTHER

## 2023-02-21 RX ORDER — KETAMINE HCL IN NACL, ISO-OSM 100MG/10ML
SYRINGE (ML) INJECTION PRN
Status: DISCONTINUED | OUTPATIENT
Start: 2023-02-21 | End: 2023-02-21 | Stop reason: SDUPTHER

## 2023-02-21 RX ORDER — MEPERIDINE HYDROCHLORIDE 25 MG/ML
12.5 INJECTION INTRAMUSCULAR; INTRAVENOUS; SUBCUTANEOUS EVERY 5 MIN PRN
Status: DISCONTINUED | OUTPATIENT
Start: 2023-02-21 | End: 2023-02-21 | Stop reason: HOSPADM

## 2023-02-21 RX ORDER — SODIUM CHLORIDE, SODIUM LACTATE, POTASSIUM CHLORIDE, CALCIUM CHLORIDE 600; 310; 30; 20 MG/100ML; MG/100ML; MG/100ML; MG/100ML
INJECTION, SOLUTION INTRAVENOUS CONTINUOUS
Status: DISCONTINUED | OUTPATIENT
Start: 2023-02-21 | End: 2023-02-21 | Stop reason: HOSPADM

## 2023-02-21 RX ORDER — HYDROMORPHONE HCL 110MG/55ML
0.5 PATIENT CONTROLLED ANALGESIA SYRINGE INTRAVENOUS EVERY 5 MIN PRN
Status: COMPLETED | OUTPATIENT
Start: 2023-02-21 | End: 2023-02-21

## 2023-02-21 RX ORDER — ONDANSETRON 2 MG/ML
INJECTION INTRAMUSCULAR; INTRAVENOUS PRN
Status: DISCONTINUED | OUTPATIENT
Start: 2023-02-21 | End: 2023-02-21 | Stop reason: SDUPTHER

## 2023-02-21 RX ORDER — HYDRALAZINE HYDROCHLORIDE 20 MG/ML
10 INJECTION INTRAMUSCULAR; INTRAVENOUS
Status: DISCONTINUED | OUTPATIENT
Start: 2023-02-21 | End: 2023-02-21 | Stop reason: HOSPADM

## 2023-02-21 RX ORDER — DEXAMETHASONE SODIUM PHOSPHATE 4 MG/ML
INJECTION, SOLUTION INTRA-ARTICULAR; INTRALESIONAL; INTRAMUSCULAR; INTRAVENOUS; SOFT TISSUE PRN
Status: DISCONTINUED | OUTPATIENT
Start: 2023-02-21 | End: 2023-02-21 | Stop reason: SDUPTHER

## 2023-02-21 RX ORDER — PROPOFOL 10 MG/ML
INJECTION, EMULSION INTRAVENOUS PRN
Status: DISCONTINUED | OUTPATIENT
Start: 2023-02-21 | End: 2023-02-21 | Stop reason: SDUPTHER

## 2023-02-21 RX ORDER — LIDOCAINE HYDROCHLORIDE 10 MG/ML
1 INJECTION, SOLUTION EPIDURAL; INFILTRATION; INTRACAUDAL; PERINEURAL
Status: DISCONTINUED | OUTPATIENT
Start: 2023-02-21 | End: 2023-02-21 | Stop reason: HOSPADM

## 2023-02-21 RX ORDER — LIDOCAINE HYDROCHLORIDE 10 MG/ML
0.5 INJECTION, SOLUTION EPIDURAL; INFILTRATION; INTRACAUDAL; PERINEURAL ONCE
Status: DISCONTINUED | OUTPATIENT
Start: 2023-02-21 | End: 2023-02-21 | Stop reason: HOSPADM

## 2023-02-21 RX ORDER — OXYCODONE HYDROCHLORIDE 5 MG/1
5 TABLET ORAL
Status: COMPLETED | OUTPATIENT
Start: 2023-02-21 | End: 2023-02-21

## 2023-02-21 RX ORDER — LABETALOL HYDROCHLORIDE 5 MG/ML
10 INJECTION, SOLUTION INTRAVENOUS
Status: DISCONTINUED | OUTPATIENT
Start: 2023-02-21 | End: 2023-02-21 | Stop reason: HOSPADM

## 2023-02-21 RX ORDER — MIDAZOLAM HYDROCHLORIDE 1 MG/ML
INJECTION INTRAMUSCULAR; INTRAVENOUS PRN
Status: DISCONTINUED | OUTPATIENT
Start: 2023-02-21 | End: 2023-02-21 | Stop reason: SDUPTHER

## 2023-02-21 RX ORDER — MAGNESIUM SULFATE HEPTAHYDRATE 500 MG/ML
INJECTION, SOLUTION INTRAMUSCULAR; INTRAVENOUS PRN
Status: DISCONTINUED | OUTPATIENT
Start: 2023-02-21 | End: 2023-02-21 | Stop reason: SDUPTHER

## 2023-02-21 RX ORDER — ONDANSETRON 2 MG/ML
4 INJECTION INTRAMUSCULAR; INTRAVENOUS
Status: DISCONTINUED | OUTPATIENT
Start: 2023-02-21 | End: 2023-02-21 | Stop reason: HOSPADM

## 2023-02-21 RX ADMIN — FENTANYL CITRATE 100 MCG: 50 INJECTION, SOLUTION INTRAMUSCULAR; INTRAVENOUS at 08:34

## 2023-02-21 RX ADMIN — SODIUM CHLORIDE: 9 INJECTION, SOLUTION INTRAVENOUS at 08:28

## 2023-02-21 RX ADMIN — FENTANYL CITRATE 50 MCG: 50 INJECTION, SOLUTION INTRAMUSCULAR; INTRAVENOUS at 09:28

## 2023-02-21 RX ADMIN — HYDROMORPHONE HYDROCHLORIDE 0.5 MG: 2 INJECTION, SOLUTION INTRAMUSCULAR; INTRAVENOUS; SUBCUTANEOUS at 10:27

## 2023-02-21 RX ADMIN — PROPOFOL 200 MG: 10 INJECTION, EMULSION INTRAVENOUS at 08:34

## 2023-02-21 RX ADMIN — DEXAMETHASONE SODIUM PHOSPHATE 8 MG: 4 INJECTION, SOLUTION INTRAMUSCULAR; INTRAVENOUS at 08:45

## 2023-02-21 RX ADMIN — MAGNESIUM SULFATE HEPTAHYDRATE 1 G: 500 INJECTION, SOLUTION INTRAMUSCULAR; INTRAVENOUS at 08:42

## 2023-02-21 RX ADMIN — ONDANSETRON 4 MG: 2 INJECTION INTRAMUSCULAR; INTRAVENOUS at 08:45

## 2023-02-21 RX ADMIN — HYDROMORPHONE HYDROCHLORIDE 0.5 MG: 2 INJECTION, SOLUTION INTRAMUSCULAR; INTRAVENOUS; SUBCUTANEOUS at 10:45

## 2023-02-21 RX ADMIN — MIDAZOLAM 2 MG: 1 INJECTION INTRAMUSCULAR; INTRAVENOUS at 08:29

## 2023-02-21 RX ADMIN — HYDROMORPHONE HYDROCHLORIDE 0.5 MG: 2 INJECTION, SOLUTION INTRAMUSCULAR; INTRAVENOUS; SUBCUTANEOUS at 10:32

## 2023-02-21 RX ADMIN — SODIUM CHLORIDE, POTASSIUM CHLORIDE, SODIUM LACTATE AND CALCIUM CHLORIDE: 600; 310; 30; 20 INJECTION, SOLUTION INTRAVENOUS at 07:19

## 2023-02-21 RX ADMIN — OXYCODONE HYDROCHLORIDE 5 MG: 5 TABLET ORAL at 11:09

## 2023-02-21 RX ADMIN — VANCOMYCIN HYDROCHLORIDE 1500 MG: 10 INJECTION, POWDER, LYOPHILIZED, FOR SOLUTION INTRAVENOUS at 07:40

## 2023-02-21 RX ADMIN — HYDROMORPHONE HYDROCHLORIDE 0.5 MG: 2 INJECTION, SOLUTION INTRAMUSCULAR; INTRAVENOUS; SUBCUTANEOUS at 10:37

## 2023-02-21 RX ADMIN — FENTANYL CITRATE 50 MCG: 50 INJECTION, SOLUTION INTRAMUSCULAR; INTRAVENOUS at 09:49

## 2023-02-21 RX ADMIN — Medication 30 MG: at 08:34

## 2023-02-21 RX ADMIN — Medication 20 MG: at 08:52

## 2023-02-21 ASSESSMENT — PAIN - FUNCTIONAL ASSESSMENT
PAIN_FUNCTIONAL_ASSESSMENT: 0-10
PAIN_FUNCTIONAL_ASSESSMENT: PREVENTS OR INTERFERES SOME ACTIVE ACTIVITIES AND ADLS

## 2023-02-21 ASSESSMENT — PAIN DESCRIPTION - LOCATION: LOCATION: ARM

## 2023-02-21 ASSESSMENT — PAIN SCALES - GENERAL
PAINLEVEL_OUTOF10: 5
PAINLEVEL_OUTOF10: 6
PAINLEVEL_OUTOF10: 8
PAINLEVEL_OUTOF10: 6
PAINLEVEL_OUTOF10: 8

## 2023-02-21 ASSESSMENT — PAIN DESCRIPTION - PAIN TYPE: TYPE: SURGICAL PAIN

## 2023-02-21 ASSESSMENT — LIFESTYLE VARIABLES: SMOKING_STATUS: 1

## 2023-02-21 NOTE — H&P
H&P Update     An electronic and hard copy history and physical was reviewed in the patient's chart. Date of Surgery Update: February 21, 2023  Chipper Canavan was seen and examined. Patient identified by surgeon; surgical site was confirmed by patient and surgeon. ?  Signed By: Sincerely,    Danna Boss  [address] [facility] [address] [place] [id number]  Email: [email or web address]  Office: [phone]    02/21/23  8:07 AM     ?    ?  ?

## 2023-02-21 NOTE — PROGRESS NOTES
Discharge instructions review with patient and pt mom. All home medications have been reviewed, pt v/u. Discharge instructions signed. Pt discharged via wheelchair. Pt discharged with instructions and all belongings. Pt mom taking stable pt home.

## 2023-02-21 NOTE — ANESTHESIA PRE PROCEDURE
Department of Anesthesiology  Preprocedure Note       Name:  Kash Mason   Age:  38 y.o.  :  1980                                          MRN:  [id number]         Date:  2023      Surgeon: Mart Van):  Adalberto Louie MD    Procedure: Procedure(s):  LEFT CUBITAL TUNNEL RELEASE - LOCAL    Medications prior to admission:   Prior to Admission medications    Medication Sig Start Date End Date Taking? Authorizing Provider   chlorhexidine (HIBICLENS) 4 % external liquid Use as body wash only night before surgery 23   Parveen Elias MD   HYDROcodone-acetaminophen [facility]) 5-325 MG per tablet Take 1 tablet by mouth every 6 hours as needed for Pain for up to 5 days. Max Daily Amount: 4 tablets 23  Parveen Elias MD   naproxen (NAPROSYN) 500 MG tablet Take 1 tablet by mouth 2 times daily (with meals) for 14 days 2/20/23 3/6/23  Parveen Elias MD   senna (SENOKOT) 8.6 MG TABS tablet Take 1 tablet by mouth 2 times daily for 7 days 23  Parveen Elias MD   varenicline (CHANTIX) 0.5 MG tablet Take 1-2 tablets by mouth See Admin Instructions 0.5mg DAILY for 3 days followed by 0.5mg TWICE DAILY for 4 days followed by 1mg TWICE DAILY 23   MIKE Montes De Oca CNP   varenicline (CHANTIX) 1 MG tablet Take 1 tablet by mouth 2 times daily 23   MIKE Montes De Oca CNP   clonazePAM (KLONOPIN) 2 MG tablet Take 1 tablet by mouth 2 times daily as needed (SEIZURES) for up to 30 days. Fill 23 TAKE TO ABORT SEIZURES Max Daily Amount: 4 mg 2/7/23 3/9/23  MIKE Montes De Oca CNP   clonazePAM (KLONOPIN) 1 MG tablet Take 1 tablet by mouth 2 times daily for 30 days.  Fill 23 Max Daily Amount: 2 mg 2/7/23 3/9/23  MIKE Montes De Oca CNP   lamoTRIgine (LAMICTAL) 200 MG tablet TAKE 2 TABLETS BY MOUTH DAILY AT [facility] 23   Eulalia Kitten, APRN - CNP   trihexyphenidyl (ARTANE) 2 MG tablet Take 1 tablet by mouth 2 times daily 23 MIKE Krishna CNP   omeprazole (PRILOSEC) 20 MG delayed release capsule Take 1 capsule by mouth every morning (before breakfast) 2/7/23   MIKE Krishna CNP   atorvastatin (LIPITOR) 10 MG tablet Take 1 tablet by mouth in the morning. 12/28/22 3/28/23  Nicolle Preston DO   diclofenac (VOLTAREN) 75 MG EC tablet Take 1 tablet by mouth 2 times daily for 7 days 12/26/22 2/7/23  MIKE Jerome CNP   Ketamine HCl POWD  11/7/22   Dylan Corea MD   FOLATE 400 MCG tablet Take 2 tablets by mouth daily 11/9/22 11/4/23  Nicolle Preston DO   gabapentin (NEURONTIN) 400 MG capsule TAKE FOUR CAPSULES BY MOUTH NIGHTLY FOR SLEEP. 11/9/22 2/7/23  Nicolle Preston DO   traZODone (DESYREL) 50 MG tablet Take 1/2-1 tablets by mouth nightly 11/9/22   Nicolle Preston DO   Diclofenac Sodium POWD Apply 1-2 g topically 3 times daily as needed (As needed for pain) Ketamine 5% Gabapentin 5% Cyclobenzaprine 2% Diclofenac 3% Lidocaine 3% 11/7/22 2/7/23  Ba Sandoval MD   ziprasidone (GEODON) 40 MG capsule Take 1 capsule by mouth daily Take with 60 mg at dinner 8/9/22   MIKE Krishna CNP   ziprasidone (GEODON) 60 MG capsule Take 1 capsule by mouth in the morning and 1 capsule in the evening. Take with meals. Take a 60 mg with a 40 mg at dinner.  8/9/22 2/7/23  MIKE Krishna CNP   amLODIPine (NORVASC) 2.5 MG tablet TAKE 1 TABLET DAILY 8/9/22   MIKE Krishna CNP   nadolol (CORGARD) 20 MG tablet 1/2 TABLET DAILY BY MOUTH FOR HIGH BLOOD PRESSURE 8/9/22   MIKE Krishna CNP   Cholecalciferol (VITAMIN D3) 125 MCG (5000 UT) CAPS Indications: Vitamin D Deficiency Take 1 capsule daily by mouth 5 days/week i.e. skip Monday and Thursday 8/9/22   MIKE Krishna - CNP   diclofenac sodium (VOLTAREN) 1 % GEL Apply 4 g topically 4 times daily as needed for Pain    Historical Provider, MD   fexofenadine (ALLEGRA) 180 MG tablet Take 1 tablet by mouth daily 9/25/20   Martínez Blunt DO   acetaminophen (TYLENOL) 500 MG tablet Take 2 tablets by mouth 3 times daily as needed for Pain 2/15/20   Lavonna Lennox, DO   GLUCOSAMINE-CHONDROITIN ER PO Take by mouth 2 times daily    Historical Provider, MD   guaiFENesin (MUCINEX) 600 MG extended release tablet Take 600 mg by mouth daily     Historical Provider, MD   Melatonin 5 MG TABS tablet Take 1 tablet by mouth nightly    Historical Provider, MD   therapeutic multivitamin-minerals (THERAGRAN-M) tablet Take 1 tablet by mouth daily. Historical Provider, MD       Current medications:    Current Facility-Administered Medications   Medication Dose Route Frequency Provider Last Rate Last Admin    ortho mix injection   Injection On Call Yady Dorantes MD        tranexamic acid (CYKLOKAPRON) 1,000 mg in sodium chloride 0.9 % 60 mL IVPB  1,000 mg IntraVENous Once Yady Dorantes MD           Allergies: Allergies   Allergen Reactions    Banana Hives and Itching    Iodides      Other reaction(s): GI Intolerance    Ancef [Cefazolin Sodium] Rash    Iodine Nausea And Vomiting     WITH IODINATED CONTRAST    Shellfish-Derived Products Nausea And Vomiting       Problem List:    Patient Active Problem List   Diagnosis Code    Essential hypertension I10    Seizure R56.9    Psychiatric diagnosis F99    Psychiatric problem F99    Palpitations R00.2    Partial complex seizure disorder without intractable epilepsy ([facility] G40.209    Social anxiety disorder F40.10    Major depression in complete remission (HCC) F32.5    Bipolar 1 disorder, mixed, full remission ([facility] F31.78    De Quervain's tenosynovitis M65.4    Paresthesias of both legs posteriorly R20.2    Durham's esophagus K22.70    Former smoker Z87.891    Erosive gastropathy K31.89    Closed fracture of one rib of right side with routine healing S22. 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    Tobacco abuse Z72.0    Psychophysiological insomnia F51.04    RUDDY on CPAP G47.33, Z99.89    Homocysteinemia R79.83       Past Medical History:        Diagnosis Date    Abscess of elbow 2012    right    Alcoholism /alcohol abuse 01/01/2005    Stopped 2014    Arthritis     Durham's esophagus     Bipolar 1 disorder, mixed, full remission ([address] 01/01/2006    Contusion of wrist, right 04/01/2016    Dislocation of part of left shoulder girdle 01/01/1985    arm pulled out of socket - no medical visit at the time    Erosive gastropathy 03/23/2018    antrum    Essential hypertension 01/01/2006    lost 100 lbs in 1 year    GERD (gastroesophageal reflux disease)     Head injury due to trauma     Frontal lobe trauma causing seizures and shakes.  No workup till after seizures/shakes appeared    Major depression in complete remission ([address] 01/01/2006    while on medicine    Osteoarthritis of left AC (acromioclavicular) joint     Osteomyelitis ([address] 01/01/2003    right tibia    Partial complex seizure disorder without intractable epilepsy ([address] 01/01/2006    fist clenching is a typical pre-seizure activity for him    Psychiatric diagnosis     Saw Dr. Jerel Serna  [phone]    Social anxiety disorder 01/01/2006    moderate       Past Surgical History:        Procedure Laterality Date    APPENDECTOMY  05/22/2012    lap appy    ELBOW DEBRIDEMENT Right     MRSA    KNEE SURGERY Right     debridement osteomyelitis    MOUTH SURGERY Left 2019    Lower molar removal    UPPER GASTROINTESTINAL ENDOSCOPY  12/16/2010    UPPER GASTROINTESTINAL ENDOSCOPY  03/23/2018    gastric and esophageal bx, erosive gastropathy    WISDOM TOOTH EXTRACTION Bilateral 1998    #4 removed       Social History:    Social History Tobacco Use    Smoking status: Every Day     Packs/day: 1.00     Types: Cigarettes     Last attempt to quit: 3/6/2018     Years since quittin.9    Smokeless tobacco: Never    Tobacco comments:     social.  Have almost quit (nicorette) 3/13/17. Restarted 18 < 1 PPD. .5/. 1-1.5 PPD. Plans on quit. 20. He is at 1 pack/day. 21. 1/2 PPD. 1.5 PPD. 22.5/3/21.  PPD. 22   Substance Use Topics    Alcohol use: Yes     Alcohol/week: 6.0 standard drinks     Types: 6 Cans of beer per week     Comment: Occasional 3-4 beers. Has not drank at all for 4 weeks. doesn't want to lose GF. 6 pack/nite. Tried AA but it was at [employer]. Ready to quit: Not Answered  Counseling given: Not Answered  Tobacco comments: social.  Have almost quit (nicorette) 3/13/17. Restarted 18 < 1 PPD. .5. 1-1.5 PPD. Plans on quit. 20. He is at 1 pack/day. 21. 1/2 PPD. 1.5 PPD. 22.5/3/21. 1 PPD. 22      Vital Signs (Current): There were no vitals filed for this visit.                                            BP Readings from Last 3 Encounters:   23 110/84   22 (!) 127/95   22 118/86       NPO Status:                                                                                 BMI:   Wt Readings from Last 3 Encounters:   23 222 lb (100.7 kg)   23 222 lb (100.7 kg)   22 208 lb (94.3 kg)     There is no height or weight on file to calculate BMI.    CBC:   Lab Results   Component Value Date/Time    WBC 6.8 2023 01:06 PM    RBC 4.33 2023 01:06 PM    HGB 13.9 2023 01:06 PM    HCT 41.6 2023 01:06 PM    MCV 96.1 2023 01:06 PM    RDW 13.5 2023 01:06 PM     2023 01:06 PM       CMP:   Lab Results   Component Value Date/Time     2023 01:06 PM    K 4.5 2023 01:06 PM    K 4.0 2022 06:12 PM    CL 99 2023 01:06 PM    CO2 24 2023 01:06 PM    BUN 17 02/09/2023 01:06 PM    CREATININE 0.7 02/09/2023 01:06 PM    GFRAA >60 04/20/2022 06:12 PM    GFRAA >60 04/25/2013 05:41 PM    AGRATIO 1.5 02/09/2023 01:06 PM    LABGLOM >60 02/09/2023 01:06 PM    GLUCOSE 106 02/09/2023 01:06 PM    PROT 7.3 02/09/2023 01:06 PM    CALCIUM 9.3 02/09/2023 01:06 PM    BILITOT 0.3 02/09/2023 01:06 PM    ALKPHOS 71 02/09/2023 01:06 PM    AST 20 02/09/2023 01:06 PM    ALT 25 02/09/2023 01:06 PM       POC Tests: No results for input(s): POCGLU, POCNA, POCK, POCCL, POCBUN, POCHEMO, POCHCT in the last 72 hours.     Coags:   Lab Results   Component Value Date/Time    PROTIME 13.6 02/09/2023 01:06 PM    INR 1.06 02/09/2023 01:06 PM       HCG (If Applicable): No results found for: PREGTESTUR, PREGSERUM, HCG, HCGQUANT     ABGs: No results found for: PHART, PO2ART, DOX3PLK, FJR6IQQ, BEART, T0JEWXET     Type & Screen (If Applicable):  No results found for: LABABO, LABRH    Drug/Infectious Status (If Applicable):  No results found for: HIV, HEPCAB    COVID-19 Screening (If Applicable):   Lab Results   Component Value Date/Time    COVID19 Not Detected 01/10/2022 11:47 AM    COVID19 Not Detected 01/10/2022 10:41 AM           Anesthesia Evaluation  Patient summary reviewed and Nursing notes reviewed no history of anesthetic complications:   Airway: Mallampati: III  TM distance: >3 FB   Neck ROM: full  Mouth opening: > = 3 FB   Dental: normal exam         Pulmonary:normal exam  breath sounds clear to auscultation  (+) sleep apnea: on noncompliant,  current smoker    (-) COPD and asthma                           Cardiovascular:    (+) hypertension:, hyperlipidemia    (-) past MI, CAD, CABG/stent, dysrhythmias,  angina and  CHF    ECG reviewed  Rhythm: regular  Rate: normal           Beta Blocker:  Dose within 24 Hrs         Neuro/Psych:   (+) seizures (Partial symptomatic epilepsy with complex partial seizures; well controlled on meds, none in over 1 year): well controlled, neuromuscular disease (Extrapyramidal movement disorder, drug-induced):, psychiatric history (Bipolar):depression/anxiety    (-) TIA and CVA           GI/Hepatic/Renal:   (+) GERD: well controlled,      (-) liver disease and no renal disease       Endo/Other:    (+) : arthritis: OA., .    (-) diabetes mellitus, hypothyroidism, hyperthyroidism               Abdominal:   (+) obese,           Vascular: Other Findings:           Anesthesia Plan      general     ASA 3       Induction: intravenous. MIPS: Postoperative opioids intended and Prophylactic antiemetics administered. Anesthetic plan and risks discussed with patient. Plan discussed with CRNA.                     Bryan Gordon MD   2/21/2023

## 2023-02-21 NOTE — OP NOTE
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 Detailed Description of Procedure:   Clinical Note:   Bartolo Duvall is a pleasant 53-year-old PATIENT  with EMG proven severe cubital tunnel syndrome and ulnar nerve compression of the left elbow. This is been ongoing for over 3  months. The patient has tried splinting anti-inflammatories and activity modification but despite that the symptoms have persisted. We spoke about about hiscandidacy for carpal tunnel release surgery. The patient elected to proceed with the operation on her after understand the risk benefits and alternatives. Detailed Description of Procedure: The patient was met outside the operative theater. The left elbow was confirmed as the correct operative limb. The patient  was then brought back to the operating room and a preoperative pause was completed confirming the correct side. .  Preoperative antibiotics with 2 g IV Ancef were then administered. The patient was then induced under general anaesthetic and then a standard prep and drape were performed of the operative  wrist after tourniquet was inflated on the right arm to a 250mmHg. Cubital Tunnel Release  An 4.5 cm incision was marked out along the length of the cubital tunnel centered on the medial epicondyle. This was then created in a curvilinear fashion longitudinally using a 15 blade.   Strict hemostasis was maintained. Subcutaneous tissue was then bluntly dissected using Metzenbaum scissors maintaining hemostasis with bipolar cautery. Adequate thick fasciocutaneous flaps were created. The medial antebrachial cutaneous nerve was protected for the balance of the operation. We then proceeded to identify the medial epicondyle, the triceps fascia and the lateral intermuscular septum and the flexor pronator fascia. The ulnar nerve was hypermobile and was quickly met immediately behind the medial epicondyle. The ulnar nerve was then unroofed starting proximally at the arcade of [place] using Metzenbaum scissors and the nerve was followed along behind the medial epicondyle as I then unroofed Gray's ligament. The release continued towards where the nerve dives into and between the ulnar and humeral heads of the FCU muscle. The first branch to the FCU was encountered and this had to be sacrificed in order to complete the mobilization and transposition of the nerve. The ulnar nerve was handled very carefully and gently throughout the entire release using a vessel loopin order to complete blunt dissection of tissue underneath it along its entire course. Care was taken not to disrupt the blood supply to the nerve along its course. Subcutaneous Transposition:  I then bluntly excised the insertional of the lateral intermuscular septum onto the medial epicondyle as this could act as a future constriction point for the transposition. At this point tourniquet was let down and I maintained strict hemostasis using bipolar cautery especially at the site of the FCU branch of the ulnar nerve. I then fashioned a fascial sling off the flexor pronator mass which measured approximately 2 cm long by 2 cm wide. This was excised sharply in a rectangular fashion. The nerve was now held in its new position anterior the medial epicondyle in a tension-free manner.   The fascial sling was then sutured to the subcutaneous tissue anteriorly from the fasciocutaneous flap creating a new pouch for the nerve.  This was done with the elbow in slight extension as this could be the most taut position for the new transposition.  A subcutaneous transposition was then secured/completed by securing the fascial sling to the subcutaneous tissue a horizontal mattress 0 vicryl suture in a tension-free manner.  The ulnar nerve's behavior was then examined during dynamic motion of the elbow and using a [place] to  the tension over top of the nerve underneath the fascial sling.  This was completely tension-free.  This completed the ulnar nerve release and subcutaneous transposition and this was satisfactory.    The original cubital tunnel was then closed using an interrupted 0 Vicryl suture.   Tourniquet was then let down and bipolar cautery was used to achieve strict hemostasis. The wound was thoroughly irrigated and we proceeded with layered closure. Subcutaneous tissue was approximated in an interrupted fashion using 3-0 Vicryl suture.  Skin was closed with a running 4-0 Monocryl suture. The skin was fully approximated and using a Prineo adhesive dressing. 15 cc of 0.25% Marcaine (plain) were injected along the length the cubital tunnel incision.       The patient was then awoken and taken recovery in stable condition.  Patient  remained completely neurovascular intact after surgery was comfortable.  Patient will discharged home and then seen my office for routine post-operative follow-up.    To begin ROM as tolerated post op day 2. Now heavy lifting (any greater than 1-2lb for 4 weeks)      Sincerely,    Margie Zayas MD [facility]  Orthopaedic Surgeon    [facility] [address], [address], [address]  Email: [email or web address]  Office: [phone]    02/21/23  10:10 AM        Electronically signed by Margie Zayas MD on 2/21/2023 at 10:08 AM

## 2023-02-21 NOTE — PROGRESS NOTES
Pt awake and alert at this time. Pt on RA, and VSS. Pt denies nausea, tolerating PO. Skin warm to surrounding surgical area, palpable pulses and able to wiggle fingers. Pt meets criteria to be discharged from Phase 1.

## 2023-02-21 NOTE — PROGRESS NOTES
Pt arrived from OR to PACU bay 3. Report received from OR staff. Pt asleep, unresponsive at time of arrival, spontaneous respirations noted. Pt arrives with simple mask in place, oral airway in place, vitals as charted. Surgical incisions dressings in place to left arm.

## 2023-02-21 NOTE — ANESTHESIA POSTPROCEDURE EVALUATION
Department of Anesthesiology  Postprocedure Note    Patient: Chipper Canavan  MRN: [id number]  YOB: 1980  Date of evaluation: 2/21/2023      Procedure Summary     Date: 02/21/23 Room / Location: [address]    Anesthesia Start: 0830 Anesthesia Stop:     Procedure: LEFT CUBITAL TUNNEL RELEASE WITH NERVE TRANSPOSITION (Left) Diagnosis:       Entrapment of ulnar nerve, left      Lateral epicondylitis of left elbow      (Entrapment of ulnar nerve, left [G56.22])      (Lateral epicondylitis of left elbow [M77.12])    Surgeons: Danna Boss MD Responsible Provider: Yemi Hart MD    Anesthesia Type: general ASA Status: 3          Anesthesia Type: No value filed.     Ector Phase I: Ector Score: 5    Ector Phase II:        Anesthesia Post Evaluation    Patient location during evaluation: PACU  Patient participation: complete - patient participated  Level of consciousness: awake and alert  Airway patency: patent  Nausea & Vomiting: no vomiting and no nausea  Complications: no  Cardiovascular status: hemodynamically stable  Respiratory status: acceptable  Hydration status: stable

## 2023-02-21 NOTE — DISCHARGE INSTRUCTIONS
Dr. Phyllis Mott MD [facility]  Hip Preservation & Sports Medicine Surgeon   [address]:     Apply ice (over your dressing) for 24-48 hours after surgery to help reduce swelling. This can be applied to the affected area 20 minutes out of every hour while you are awake. The bulky dressing will be removed in 2-3 days, at your office visit. **DO NOT remove the Prineo Glued Mesh Dressing (if used). This will be pulled off in the office between 2-3 weeks. Please take all of your post-operative medications as prescribed. Please do not alter how you take these medications without contacting the physician. If you have any questions and/or concerns about your post-operative medications, please call our office. Please do not take any additional Tylenol while you are taking the Norco  This medication already contains Tylenol and taking additional Tylenol may cause liver damage. It is okay to use Tylenol once you are no longer taking the Norco    It is common to feel drowsy while taking pain medication. Do not drink alcohol or drive while taking pain medication. Nausea is also a common side effect of using pain medication. If this occurs, try taking your medication with food. Also drink plenty of water while taking the pain medication. You may use an over the counter anti-nausea as needed. If nausea becomes severe, please contact the office and a prescription for Zofran may be prescribed. To optimize your pain control, you can take your pain medication as prescribed for 2 days, then gradually taper off over the next day as tolerable. If you feel your pain is not well controlled or begins to worsen following your first post-operative visit, please contact our office. You will also need take a daily aspirin. This will help prevent blood clots. Please keep your dressings/wounds dry at all times.   Do not swim in pools, lakes, etc. until instructed to do so by your physician that it is safe to do so. You may shower 3 days post-surgery, as long as the wounds are clean and dry. You may bathe 21 days after surgery, or otherwise instructed by Dr. Adrian Arrington. Do not apply any ointments or creams to your surgical wounds. Please call to schedule your post-surgery physical therapy prior to your surgery date. Do not start physical therapy until advised to do so by Dr. Adrian Arrington. This will be discussed at your first post-operative appointment. Dr. Adrian Arrington may modify your physical therapy start date based on how you are doing and what all occurred during surgery. You may be advised by Dr. Adrian Arrington and/or staff regarding gentle home exercises to perform (if appropriate). Please do not attempt any strenuous activities until you have had your first post-operative appointment and been cleared to do so by Dr. Adrian Arrington. Weight Bearing Instructions  WBAT  Use bracing, sling, crutches (if appropriate) as advised by Dr. Adrian Arrington and/or staff. Modifications and/or changes will be made as you begin to heal.    Asiya Mires (compression stockings) will be provided for you to wear on the both limbs. This is to help avoid blood clots. You will be advised when you can discontinue the use of this stocking. Keep in mind that you should not drive while taking narcotic pain medication. If surgery involves your hand/wrist, you will be advised to not drive for 2 weeks. If you develop a fever, redness or drainage from the incision site, please call our office to notify Dr. Adrian Arrington but proceed to the nearest Emergency Department for evaluation. If you have any questions regarding these instructions, please contact the office.     [address] [facility] Assistant to Dr. Phyllis Mott  Ph: [phone]  Fax: [phone]     POST OP MEDICATIONS    Norco 5-325mg   1 tablet by mouth every 6 hours as needed  Will be given a total of 12 tablets  Naprosyn 500mg   1 tablet by mouth 2 times daily  Will be taken for 14 day  Sennokot 8.6mg   1 tablet twice a day by mouth as needed for constipation    If you have any questions regarding these medications, please contact the office.       Ciarra Wynn  Patient  to Dr. Ashley Sims and [address] [place] [address], [address], [address]  Ph: [phone]  Fax: [phone]

## 2023-02-23 ENCOUNTER — OFFICE VISIT (OUTPATIENT)
Dept: ORTHOPEDIC SURGERY | Age: 43
End: 2023-02-23

## 2023-02-23 ENCOUNTER — HOSPITAL ENCOUNTER (OUTPATIENT)
Dept: OCCUPATIONAL THERAPY | Age: 43
Setting detail: THERAPIES SERIES
Discharge: HOME | End: 2023-02-23
Payer: COMMERCIAL

## 2023-02-23 VITALS — HEIGHT: 68 IN | BODY MASS INDEX: 34.1 KG/M2 | WEIGHT: 225 LBS

## 2023-02-23 DIAGNOSIS — G56.22 ENTRAPMENT OF LEFT ULNAR NERVE: Primary | ICD-10-CM

## 2023-02-23 PROCEDURE — 97110 THERAPEUTIC EXERCISES: CPT | Performed by: OCCUPATIONAL THERAPIST

## 2023-02-23 PROCEDURE — 97165 OT EVAL LOW COMPLEX 30 MIN: CPT | Performed by: OCCUPATIONAL THERAPIST

## 2023-02-23 PROCEDURE — 99024 POSTOP FOLLOW-UP VISIT: CPT | Performed by: ORTHOPAEDIC SURGERY

## 2023-02-23 NOTE — PLAN OF CARE
[facility], [address] [place] [address]  Phone: [phone]  Fax [phone]    Occupational [facility]  Dear  Dr. Adrian Arrington     We had the pleasure of evaluating the following patient for occupational therapy services at [address] [place] [address]. A summary of our findings can be found in the initial assessment below. This includes our plan of care. If you have any questions or concerns regarding these findings, please do not hesitate to contact me at the office phone number checked above. Thank you for the referral.     Physician Signature:_______________________________Date:__________________  By signing above (or electronic signature), therapists plan is approved by physician      Patient: Young Jeffries   : 1980   MRN: [id number]  Referring Physician:        Evaluation Date: 2023      Medical Diagnosis Information:  Diagnosis: L ulnar nerve decompression with transposition L elbow pain M25.522                                             [place]  [facility] [place]     Date of Injury: na  Date of Surgery: 23    Date of Patient follow up with Physician:     RESTRICTIONS/PRECAUTIONS: none      Latex Allergy:  [x]No      []Yes  Pacemaker:  [x] No       [] Yes     Preferred Language for Healthcare:   [x]English       []other:     Functional Scale: 87% disability (Quick DASH)   Date assessed:  2023    C-SSRS Triggered by Intake questionnaire (Past 2 wk assessment):    No, Questionnaire did not trigger screening. SUBJECTIVE: Patient reported deficits/history of current problem: progressive onset     Pain Scale: 6-7/10  [x]Constant      []Intermittent    []other:  Pain Location:  elbow   Easing factors: ice  Provocative factors:      [x] Patient reported history, allergies, and medications reviewed - see intake form.        Comorbidities Affecting Functional Performance:     [x]Anxiety (F41.9)/Depression (F32.9) []Hypertension  []Diabetes Type 1(E10.65) or 2 (E11.65)     []CVA   []Rheumatoid Arthritis (M05.9)                     []Aherisclerosis  []Fibromyalgia (M79.7)                                 []Angina Pectoris  []Neuropathy(G60.9)                                    []Disc Pathology  []Osteoarthritis(M19.91)                               []Osteoporosis  []None                                                           []Morbid Obesity  [x]Other:  substance abuse                                                       []COPD    OBJECTIVE:   Date:  Hand Dominance:     [x]  Right    [] Left 2/23/2023     Objective Measures:    PAIN 6-7/10   Quick DASH 86%                   Digits tip to DPFC in cm Index:  Long:  Ring:  Small:   Thumb ROM MP  IP   Thumb opposition  R:  L:   Thumb Radial/Palmar abd ROM R:  L:   Wrist ROM Ext/Flex R:  L:   Rad/Uln dev ROM R:  L:   Forearm ROM  Sup/pron R:sup 80  L: sup 60   Elbow ROM Ext/flex R: 0/135  L: 20/95   Shoulder Flex  Shoulder Abd  Shoulder IR/ER    Edema in cm circumf. MCPJs R:  L:   Edema in cm circumf. Wrist R:  L:    strength in lbs R:  L:   Pinch Strengthin lbs: lat  R:  L:   Pinch Strength in lbs:  3 point R:  L:     MMT:     Observations:  (including splints, bandages, incisions, scars):    Sensation:  [] No reported deficits  [] Intact to light touch    [] [place] Chapis test completed, findings as noted:  [x] Other: N and T in ring and small finger but better after sx      Occupational Profile:  Home Enviroment: lives with  [] spouse,  [] family,  [x] alone,  [] significant other,   [] other:    Occupation/School: works at [employer] but off work     Recreational Activities/Meaningful Interests: none per pt.     Prior Level of Function: [x] Independent with ADLs/IADLs     [] Assistance needed (describe):    Patient-Identified Primary Performance Deficits (to be addressed in POC):   [] bathing    [x] household tasks (cooking/cleaning)   [] dressing    [] self feeding   [x] grooming    [x] work/education   [] functional mobility   [] sleeping/rest   [] toileting/hygiene   [] recreational activities   [] driving    [] community/social participation   [] other:     Functional Mobility/Transfers/Gait:  [x] Independent - no significant gait deviations  [] Assistance needed   [] Assistive device used: Falls Risk Assessment (30 days):   [x] Falls Risk assessed and no intervention required. [] Falls Risk assessed and Patient requires intervention due to being higher risk   TUG score (>12s at risk):     [] Falls education provided, including      Review Of Systems (ROS): [x]Performed Review of systems (Integumentary, CardioPulmonary, Neurological) by intake and observation. Intake form has been scanned into medical record. Patient has been instructed to contact their primary care physician regarding ROS issues if not already being addressed at this time. ASSESSMENT:   This patient presents with signs and symptoms consistent with the medical diagnosis provided by the referring physician.      Impairments (physical, cognitive and/or psychosocial):  [x] Decreased mobility   [x] Weakness    [x] Hypersensitivity   [x] Pain/tenderness   [x] Edema/swelling   [] Decreased coordination (fine/gross motor)   [] Impaired body mechanics  [x] Sensory loss  [] Loss of balance   [] Other:      Rehab Potential:   [x] Excellent [] Good [] Fair  [] Poor     Barriers affecting rehab potential:  []Age    []Lack of Motivation             []Co-Morbidities  []Cognitive Function  []Environmental/home/work barriers   []Professional/work barriers  []Other:                                  [x]Smoker    Tolerance of evaluation/treatment:    [x] Excellent [] Good [] Fair  [] Poor    PLAN OF CARE:  Interventions:   [x] Therapeutic Exercise [x] Therapeutic Activity    [x] Activities of Daily Living [x] Neuromuscular Re-education      [x] Patient Education  [x] Manual Therapy      [x] Modalities as needed, and not otherwise contraindicated, including: ultrasound,paraffin,moist heat/cold pack, electrical stimulation, contrast bath, iontophoresis  [] Splinting    Frequency/Duration:  1 days per week for 8 weeks      GOALS:  Patient stated goal:   [] Progressing: [] Met: [] Not Met: [] Adjusted    Therapist goals for Patient:   Short Term Goals: To be achieved in: 2 weeks  1. Independent in HEP and progression per patient tolerance, in order to prevent re-injury. [] Progressing: [] Met: [] Not Met: [] Adjusted   2. Patient will have a decrease in pain to facilitate improvement in movement, function, and ADLs as indicated by Functional Deficits. [] Progressing: [] Met: [] Not Met: [] Adjusted    Long Term Goals to be achieved in 8 weeks (through 4/23/23), including patient directed goals to address patient identified performance deficits:  1) Pt to be independent in graded HEP progression with a good level of effort and compliance. [] Progressing: [] Met: [] Not Met: [] Adjusted   2) Pt to report a score of </= 25% on the Quick DASH disability questionnaire for increased performance with carrying, moving, and handling objects. [] Progressing: [] Met: [] Not Met: [] Adjusted   3) Pt will demonstrate increased ROM to L elbow to 5/130 for improved independence with work tasks. [] Progressing: [] Met: [] Not Met: [] Adjusted   4) Pt will demonstrate increased strength to L  to 50# for improved independence with dressing. [] Progressing: [] Met: [] Not Met: [] Adjusted   5) Pt will have a decrease in pain to 0-2/10 to facilitate cleaning/cooking.   [] Progressing: [] Met: [] Not Met: [] Adjusted        OCCUPATIONAL THERAPY EVALUATION COMPLEXITY JUSTIFICATION:    [x] An occupational profile and medical/therapy history, which includes:   [x] a brief history including medical and/or therapy records relating to the     presenting problem   [] an expanded review of medical and/or therapy records and additional review     of physical, cognitive or psychosocial history related to current functional    performance   [] an extensive additional review of review of medical and/or therapy records   and physical, cognitive, or psychosocial history related to current    functional performance    [x] An assessment that identifies performance deficits (relating to physical, cognitive, or psychosocial skills) that result in activity limitations and/or participation restrictions:   [x] 1-3 performance deficits   [] 3-5 performance deficits   [] 5 or more performance deficits    [x] Clinical decision making of:   [x] low complexity, including analysis of occupational profile, data analysis from problem focused assessment, and consideration of a limited number of treatment options.  No comorbidities affect occupational performance.  No task modifications or assistance needed to complete evaluation.   [] moderate complexity, including analysis of occupational profile, data analysis from detailed assessment and consideration of several treatment options.  Comorbidities that affect occupational performance may be present.  Minimal to moderate task modifications or assistance needed to complete assessment.   [] high complexity, including analysis of occupational profile, analysis of data from comprehensive assessment and consideration of multiple treatment options.  Multiple comorbidities present that affect occupational performance.  Significant task modifications or assistance needed to complete assessment.    Evaluation Code:  [x] Low Complexity EVAL 34891 (typically 30 minutes face to face)  [] Mod Complexity EVAL 90466 (typically 45 minutes face to face)  [] High Complexity EVAL 07212 (typically 60 minutes face to face)    Electronically signed by:  Quynh Alcantar OT  , OTR\L, [id number]

## 2023-02-23 NOTE — FLOWSHEET NOTE
[address] [facility], [address] [place]  Phone: [phone]  Fax [phone]    Occupational Therapy Treatment Note/ Progress Report:     Is this a Progress Report:     []  Yes  [x]  No      If Yes:  Date Range for reporting period:  Beginning 23  Ending 2023  Date:  2023  Patient Name:  Trent Infante    :  1980  MRN: [id number]  Medical/Treatment Diagnosis Information:  Diagnosis: L ulnar nerve decompression with transposition L elbow pain M25.522         Insurance/Certification information:   [facility]  Physician Information:   arjun  Has the plan of care been signed (Y/N):        []  Yes  [x]  No     Visit # Insurance Allowable Auth Required   1  []  Yes []  No    From 23  to 2023    Date of Injury: na  Date of Surgery: 23     Date of Patient follow up with Physician:      RESTRICTIONS/PRECAUTIONS: none       Latex Allergy:  [x]No      []Yes                    Pacemaker:  [x] No       [] Yes      Preferred Language for Healthcare:   [x]English       []other:      Functional Scale: 87% disability (Quick DASH)                            Date assessed:  2023     C-SSRS Triggered by Intake questionnaire (Past 2 wk assessment):    No, Questionnaire did not trigger screening. SUBJECTIVE: Patient reported deficits/history of current problem: progressive onset      Pain Scale: 6-7/10       [x]Constant                []Intermittent              []other:  Pain Location:  elbow   Easing factors: ice  Provocative factors:       [x] Patient reported history, allergies, and medications reviewed - see intake form.         Comorbidities Affecting Functional Performance:             [x]Anxiety (F41.9)/Depression (F32.9)           []Hypertension  []Diabetes Type 1(E10.65) or 2 (E11.65)     []CVA   []Rheumatoid Arthritis (M05.9)                     []Aherisclerosis  []Fibromyalgia (M79.7)                                 []Angina Pectoris  []Neuropathy(G60.9)                                    []Disc Pathology  []Osteoarthritis(M19.91)                               []Osteoporosis  []None                                                           []Morbid Obesity  [x]Other:  substance abuse                                                       []COPD     OBJECTIVE:   Date:  Hand Dominance:     [x]  Right    [] Left 2/23/2023      Objective Measures:     PAIN 6-7/10   Quick DASH 86%                           Digits tip to DPFC in cm Index:  Long:  Ring:  Small:   Thumb ROM MP  IP   Thumb opposition  R:  L:   Thumb Radial/Palmar abd ROM R:  L:   Wrist ROM Ext/Flex R:  L:   Rad/Uln dev ROM R:  L:   Forearm ROM  Sup/pron R:sup 80  L: sup 60   Elbow ROM Ext/flex R: 0/135  L: 20/95   Shoulder Flex  Shoulder Abd  Shoulder IR/ER     Edema in cm circumf. MCPJs R:  L:   Edema in cm circumf. Wrist R:  L:    strength in lbs R:  L:   Pinch Strengthin lbs: lat  R:  L:   Pinch Strength in lbs:  3 point R:  L:      MMT:      Observations:  (including splints, bandages, incisions, scars):     Sensation:  [] No reported deficits                       [] Intact to light touch                          [] [place] Chapis test completed, findings as noted:  [x] Other: N and T in ring and small finger but better after sx        Occupational Profile:  Home Enviroment: lives with  [] spouse,  [] family,  [x] alone,  [] significant other,   [] other:     Occupation/School: works at [employer] but off work      Recreational Activities/Meaningful Interests: none per pt. MODALITIES: 2/23/23      Fluidotherapy (44833)      Estim (08224/12288)      Paraffin (99318)      US (30178)      Iontophoresis (95778)      Hot Pack      Cold Pack            INTERVENTIONS:      Therapeutic Exercise (55780) Issued HEP see media file. Instructed to wait 3 more days before starting exercises. Therapeutic Activity (89484)                  Manual Therapy (81254)      (IASTM, Dry Needling, manual mobilization)            Neuromuscular Reeducation (00334) Edu on use of ice                  ADL Training (88160)                  HEP Training/Review See sheet(s)                 Splinting      Lcode:      Orthotic Mgmt, Subsequent Enc (09173)      Orthotic Mgmt & Training (98401)            Other: Has compression sleeve        Therapeutic Exercise & NMR:  [x] (18247) Provided verbal/tactile cueing for activities related to strengthening, flexibility, endurance, ROM  for improvements in scapular, scapulothoracic and UE control with self care, reaching, carrying, lifting, house/yardwork, driving/computer work.    [] (48306) Provided verbal/tactile cueing for activities related to improving balance, coordination, kinesthetic sense, posture, motor skill, proprioception  to assist with  scapular, scapulothoracic and UE control with self care, reaching, carrying, lifting, house/yardwork, driving/computer work.     Therapeutic Activities & NMR:    [] (92687 or 49524) Provided verbal/tactile cueing for activities related to improving balance, coordination, kinesthetic sense, posture, motor skill, proprioception and motor activation to allow for proper function of scapular, scapulothoracic and UE control with self care, carrying, lifting, driving/computer work    Home Exercise Program:    [x] (25163) Reviewed/Progressed HEP activities related to strengthening, flexibility, endurance, ROM of scapular, scapulothoracic and UE control with self care, reaching, carrying, lifting, house/yardwork, driving/computer work  [] (12032) Reviewed/Progressed HEP activities related to improving balance, coordination, kinesthetic sense, posture, motor skill, proprioception of scapular, scapulothoracic and UE control with self care, reaching, carrying, lifting, house/yardwork, driving/computer work      Manual Treatments:  PROM / STM / Oscillations-Mobs:  G-I, II, III, IV (PA's, Inf., Post.)  [] (90348) Provided manual therapy to mobilize soft tissue/joints of cervical/CT, scapular GHJ and UE for the purpose of modulating pain, promoting relaxation,  increasing ROM, reducing/eliminating soft tissue swelling/inflammation/restriction, improving soft tissue extensibility and allowing for proper ROM for normal function with self care, reaching, carrying, lifting, house/yardwork, driving/computer work    ADL Training:  [] (42440) Provided self-care/home management training related to activities of daily living and compensatory training, and/or use of adaptive equipment      Charges:  Timed Code Treatment Minutes: 10   Total Treatment Minutes: 20   Worker's Comp: Time In/Time Out     [x] EVAL (LOW) 30512 (typically 20 minutes face-to-face)    [] EVAL (MOD) 77879 (typically 30 minutes face-to-face)  [] EVAL (HIGH) 65756 (typically 45 minutes face-to-face)  [] OT Re-eval (96790)       [x] Reddy [phone]) x  1    [] LWWDS(04500)  [] NMR (19482) x      [] Estim (attended) (27416)   [] Manual ([phone]) x      [] US (19476)  [] TA (00977) x      [] Paraffin (83972)  [] ADL  (39520) x     [] Splint/L code:    [] Estim (unattended) (22 357179)  [] Fluidotherapy (87966)  [] DN 1-2 (20263)   [] DN 3+ (48598)  [] Orthotic Mgmt, Subsequent Enc (88737)  [] Orthotic Mgmt & Training (25361)  [] Other:      Overall Progression Towards Functional Goals/Treatment Progress Update:  [] Patient is progressing as expected towards functional goals listed. [] Progression is slowed due to complexities/impairments listed. [] Progression has been slowed due to co-morbidities.   [x] Plan just implemented, too soon to assess goals progression <30 days  [] Goals require adjustment due to lack of progress  [] Patient is not progressing as expected and requires additional follow up with physician  [] All goals are met  [] Other:     Prognosis for POC: [x] Good [] Fair  [] Poor    Patient requires continued skilled intervention: [x] Yes  [] No    Treatment/Activity Tolerance:  [x] Patient able to complete treatment  [] Patient limited by fatigue  [] Patient limited by pain    [] Patient limited by other medical complications  [] Other:                  PLAN: See eval  [] Continue per plan of care [] Alter current plan (see comments above)  [x] Plan of care initiated [] Hold pending MD visit [] Discharge    Electronically signed by:  Trina Cruz, OT, NIY\B,[id number]    Note: If patient does not return for scheduled/ recommended follow up visits, this note will serve as a discharge from care along with most recent update on progress.

## 2023-02-23 NOTE — LETTER
[place] [facility]  [address] [facility] [address]  Phone: [phone]  Fax: [phone]    Shelli Smith MD    February 23, 2023     [facility] [address]    Patient: Kenneth Frod   MR Number: [id number]   YOB: 1980   Date of Visit: 2/23/2023       Dear Brandon Diaz: Thank you for referring Sheldon Blunt to me for evaluation/treatment. Below are the relevant portions of my assessment and plan of care. If you have questions, please do not hesitate to call me. I look forward to following Verdene Mortimer along with you.     Sincerely,      Shelli Smith MD

## 2023-02-23 NOTE — PROGRESS NOTES
History of Present Illness:  Alesia Beal is a pleasant 38 y.o. male who presents for a post operative visit. He is 2 out following a left cubital tunnel release and ulnar nerve transposition surgery. Overall He is doing okay and feels that their pain is well controlled with current pain medications. He has been compliant with wearing the splint and weight bearing restrictions at all times. He plans to do physical therapy at [facility]. He denies fevers, chills, numbness, tingling, and shortness of breath. Medical History:  Patient's medications, allergies, past medical, surgical, social and family histories were reviewed and updated as appropriate. No notes on file    Review of Systems  A 14 point review of systems was completed by the patient and is available in the media section of the scanned medical record and was reviewed on 2/23/2023. Vital Signs: There were no vitals filed for this visit. General/Appearance: Alert and oriented and in no apparent distress. Skin:  There are no skin lesions, cellulitis, or extreme edema. The patient has warm and well-perfused Bilateral upper extremities with brisk capillary refill. left Elbow Exam:    Inspection:Soft dressing intact, dry, and well padded. The Therabond dressing and prineo was  still in place. Mild ecchymosis and swelling are present as can be expected. There is no erythema, drainage or other signs of infection. Palpation:  No crepitus to gentle motion of the elbow    Active Range of Motion: Full finger ROM, thumb extension, finger abduction, extension, and thumb apposition and flexion    Passive Range of Motion:   no pain with passive motion of the fingers/wrist    Strength:  Deferred    Special Tests:  Deferred.     Neurovascular: Sensation to light touch is intact, no motor deficits, palpable radial pulses 2+    Radiology:     Plain radiographs of the LEFT  elbow,  comprising 2 views, were obtained and reviewed in the [At ___ Weeks Gestation] : at [unfilled] weeks gestation [ Section] : by  section office: Shows postsurgical changes from the  soft tissue procedure with no bone concerns. Impression: Stable postop x-ray. Assessment :  Mr. Young Jeffries is a pleasant 38 y.o. patient who is  2 days post left cubital tunnel release and ulnar nerve transposition surgery. No acute surgical concerns. Impression:  Encounter Diagnosis   Name Primary? Entrapment of left ulnar nerve Yes       Office Procedures:  No orders of the defined types were placed in this encounter. Treatment Plan:    Overall Young Jeffries is doing well. The pain is well-controlled. We recommend that He transition out of his ace wrap and wear an OTC elbow soft padding. The patient was told that he is restricted from driving for just  1 weeks postop. All of his  questions were fully answered today. We would like to see Young Jeffries back in 2 weeks for follow-up visit and wound check. Sincerely,    Phyllis Mott MD [address] [facility] [address] [place] [id number]  Email: [email or web address]  Office: [phone]    02/23/23  3:53 PM [None] : No delivery complications [Passed] : passed [de-identified] : no oxygen or blood flow

## 2023-03-07 DIAGNOSIS — F40.10 SOCIAL ANXIETY DISORDER: Chronic | ICD-10-CM

## 2023-03-07 DIAGNOSIS — G40.209 PARTIAL SYMPTOMATIC EPILEPSY WITH COMPLEX PARTIAL SEIZURES, NOT INTRACTABLE, WITHOUT STATUS EPILEPTICUS (HCC): Chronic | ICD-10-CM

## 2023-03-07 RX ORDER — CLONAZEPAM 1 MG/1
1 TABLET ORAL 2 TIMES DAILY
Qty: 60 TABLET | Refills: 1 | Status: SHIPPED | OUTPATIENT
Start: 2023-03-07 | End: 2023-05-06

## 2023-03-07 NOTE — TELEPHONE ENCOUNTER
Partial symptomatic epilepsy with complex partial seizures, not intractable, without status epilepticus (HCC)  -     clonazePAM (KLONOPIN) 1 MG tablet; Take 1 tablet by mouth 2 times daily for 60 days. Fill 2/7/23 Max Daily Amount: 2 mg  Social anxiety disorder  -     clonazePAM (KLONOPIN) 1 MG tablet; Take 1 tablet by mouth 2 times daily for 60 days. Fill 2/7/23 Max Daily Amount: 2 mg  Of note patient using medical marijuana. Controlled Substance Monitoring:  Acute and Chronic Pain Monitoring:   RX Monitoring 3/7/2023   Attestation -   Acute Pain Prescriptions -   Periodic Controlled Substance Monitoring No signs of potential drug abuse or diversion identified.

## 2023-03-08 ENCOUNTER — HOSPITAL ENCOUNTER (OUTPATIENT)
Dept: OCCUPATIONAL THERAPY | Age: 43
Setting detail: THERAPIES SERIES
Discharge: HOME | End: 2023-03-08
Payer: OTHER MISCELLANEOUS

## 2023-03-08 PROCEDURE — 97110 THERAPEUTIC EXERCISES: CPT | Performed by: OCCUPATIONAL THERAPIST

## 2023-03-08 NOTE — FLOWSHEET NOTE
[address] [facility], [address] [place] [address]  Phone: [phone]  Fax [phone]    Occupational Therapy Treatment Note/ Progress Report:     Is this a Progress Report:     []  Yes  [x]  No      If Yes:  Date Range for reporting period:  Beginning 23  Ending 3/8/2023  Date:  3/8/2023  Patient Name:  Mally Bautista    :  1980  MRN: [id number]  Medical/Treatment Diagnosis Information:  Diagnosis: L ulnar nerve decompression with transposition L elbow pain M25.522         Insurance/Certification information:   [facility]  Physician Information:   arjun  Has the plan of care been signed (Y/N):        []  Yes  [x]  No     Visit # Insurance Allowable Auth Required   2  []  Yes []  No    From 23  to 3/8/2023    Date of Injury: na  Date of Surgery: 23     Date of Patient follow up with Physician:      RESTRICTIONS/PRECAUTIONS: none       Latex Allergy:  [x]No      []Yes                    Pacemaker:  [x] No       [] Yes      Preferred Language for Healthcare:   [x]English       []other:      Functional Scale: 87% disability (Quick DASH)                            Date assessed:  2023     C-SSRS Triggered by Intake questionnaire (Past 2 wk assessment):    No, Questionnaire did not trigger screening. SUBJECTIVE: My arm feels heavy     Patient reported deficits/history of current problem: progressive onset      Pain Scale: 3/10       [x]Constant                []Intermittent              []other:  Pain Location:  elbow   Easing factors: ice  Provocative factors:       [x] Patient reported history, allergies, and medications reviewed - see intake form.         Comorbidities Affecting Functional Performance:             [x]Anxiety (F41.9)/Depression (F32.9)           []Hypertension  []Diabetes Type 1(E10.65) or 2 (E11.65)     []CVA   []Rheumatoid Arthritis (M05.9) []Aherisclerosis  []Fibromyalgia (M79.7)                                 []Angina Pectoris  []Neuropathy(G60.9)                                    []Disc Pathology  []Osteoarthritis(M19.91)                               []Osteoporosis  []None                                                           []Morbid Obesity  [x]Other:  substance abuse                                                       []COPD     OBJECTIVE:   Date:  Hand Dominance:     [x]  Right    [] Left 2/23/2023    3/8/23    Objective Measures:      PAIN 6-7/10    Quick DASH 86%                                Digits tip to DPFC in cm Index:  Long:  Ring:  Small:    Thumb ROM MP  IP    Thumb opposition  R:  L:    Thumb Radial/Palmar abd ROM R:  L:    Wrist ROM Ext/Flex R:  L:    Rad/Uln dev ROM R:  L:    Forearm ROM  Sup/pron R:sup 80  L: sup 60    Elbow ROM Ext/flex R: 0/135  L: 20/95 WNL  WNL   Shoulder Flex  Shoulder Abd  Shoulder IR/ER      Edema in cm circumf. MCPJs R:  L:    Edema in cm circumf. Wrist R:  L:     strength in lbs R:  L: R: 106#  L: 20#   Pinch Strengthin lbs: lat  R:  L:    Pinch Strength in lbs:  3 point R:  L:       MMT:       Observations:  (including splints, bandages, incisions, scars):      Sensation:  [] No reported deficits                       [] Intact to light touch                          [] Seaforth Chapis test completed, findings as noted:  [x] Other: N and T in ring and small finger but better after sx        Occupational Profile:  Home Enviroment: lives with  [] spouse,  [] family,  [x] alone,  [] significant other,   [] other:     Occupation/School: works at [employer] but off work      Recreational Activities/Meaningful Interests: none per pt.      MODALITIES: 2/23/23  3/8/23     Fluidotherapy (31829)      Estim (90767/01730)      Paraffin (39228)      US (98145)      Iontophoresis (89852)      Hot Pack      Cold Pack  8' after tx           INTERVENTIONS:      Therapeutic Exercise (68376) Issued HEP see media file. Instructed to wait 3 more days before starting exercises. Gripping putty   5'    Flex bar   Red x 20 each     S/p   2 wts 10 x 2     Forearm stretching   X 10 each     Therapeutic Activity (32482)  Ulnar n stretch x 5       Elbow AROM x 15           Manual Therapy (39016)      (IASTM, Dry Needling, manual mobilization)            Neuromuscular Reeducation (60777) Edu on use of ice                  ADL Training (98627)                  HEP Training/Review See sheet(s)                 Splinting      Lcode:      Orthotic Mgmt, Subsequent Enc (50644)      Orthotic Mgmt & Training (17750)            Other: Has compression sleeve        Therapeutic Exercise & NMR:  [x] (82541) Provided verbal/tactile cueing for activities related to strengthening, flexibility, endurance, ROM  for improvements in scapular, scapulothoracic and UE control with self care, reaching, carrying, lifting, house/yardwork, driving/computer work.    [] (15783) Provided verbal/tactile cueing for activities related to improving balance, coordination, kinesthetic sense, posture, motor skill, proprioception  to assist with  scapular, scapulothoracic and UE control with self care, reaching, carrying, lifting, house/yardwork, driving/computer work.     Therapeutic Activities & NMR:    [] (79153 or 18551) Provided verbal/tactile cueing for activities related to improving balance, coordination, kinesthetic sense, posture, motor skill, proprioception and motor activation to allow for proper function of scapular, scapulothoracic and UE control with self care, carrying, lifting, driving/computer work    Home Exercise Program:    [x] (22348) Reviewed/Progressed HEP activities related to strengthening, flexibility, endurance, ROM of scapular, scapulothoracic and UE control with self care, reaching, carrying, lifting, house/yardwork, driving/computer work  [] (20718) Reviewed/Progressed HEP activities related to improving balance, coordination, kinesthetic sense, posture, motor skill, proprioception of scapular, scapulothoracic and UE control with self care, reaching, carrying, lifting, house/yardwork, driving/computer work      Manual Treatments:  PROM / STM / Oscillations-Mobs:  G-I, II, III, IV (PA's, Inf., Post.)  [] (42376) Provided manual therapy to mobilize soft tissue/joints of cervical/CT, scapular GHJ and UE for the purpose of modulating pain, promoting relaxation,  increasing ROM, reducing/eliminating soft tissue swelling/inflammation/restriction, improving soft tissue extensibility and allowing for proper ROM for normal function with self care, reaching, carrying, lifting, house/yardwork, driving/computer work    ADL Training:  [] (34171) Provided self-care/home management training related to activities of daily living and compensatory training, and/or use of adaptive equipment      Charges:  Timed Code Treatment Minutes: 23   Total Treatment Minutes: 30   Worker's Comp: Time In/Time Out     [] EVAL (LOW) 82502 (typically 20 minutes face-to-face)    [] EVAL (MOD) 80377 (typically 30 minutes face-to-face)  [] EVAL (HIGH) 54707 (typically 45 minutes face-to-face)  [] OT Re-eval (25380)       [x] Reddy [phone]) x  2    [] LQVCK(10883)  [] NMR (99726) x      [] Estim (attended) (20306)   [] Manual ([id number]) x      [] US (68765)  [] TA (37233) x      [] Paraffin (48490)  [] ADL  (98450) x     [] Splint/L code:    [] Estim (unattended) (22 135701)  [] Fluidotherapy (49648)  [] DN 1-2 (10532)   [] DN 3+ (63604)  [] Orthotic Mgmt, Subsequent Enc (04046)  [] Orthotic Mgmt & Training (20187)  [] Other:      Overall Progression Towards Functional Goals/Treatment Progress Update:  [x] Patient is progressing as expected towards functional goals listed. [] Progression is slowed due to complexities/impairments listed. [] Progression has been slowed due to co-morbidities.   [] Plan just implemented, too soon to assess goals progression <30 days  [] Goals require adjustment due to lack of progress  [] Patient is not progressing as expected and requires additional follow up with physician  [] All goals are met  [] Other:     Prognosis for POC: [x] Good [] Fair  [] Poor    Patient requires continued skilled intervention: [x] Yes  [] No    Treatment/Activity Tolerance:  [x] Patient able to complete treatment  [] Patient limited by fatigue  [] Patient limited by pain    [] Patient limited by other medical complications  [] Other:                  PLAN: See eval  [x] Continue per plan of care [] Alter current plan (see comments above)  [] Plan of care initiated [] Hold pending MD visit [] Discharge    Electronically signed by:  Rupali Ruelas OT, [id number]    Note: If patient does not return for scheduled/ recommended follow up visits, this note will serve as a discharge from care along with most recent update on progress.

## 2023-03-09 ENCOUNTER — OFFICE VISIT (OUTPATIENT)
Dept: ORTHOPEDIC SURGERY | Age: 43
End: 2023-03-09

## 2023-03-09 VITALS — BODY MASS INDEX: 34.1 KG/M2 | HEIGHT: 68 IN | RESPIRATION RATE: 12 BRPM | WEIGHT: 225 LBS

## 2023-03-09 DIAGNOSIS — G56.22 ENTRAPMENT OF LEFT ULNAR NERVE: Primary | ICD-10-CM

## 2023-03-09 DIAGNOSIS — M77.12 LATERAL EPICONDYLITIS OF LEFT ELBOW: ICD-10-CM

## 2023-03-10 DIAGNOSIS — G40.209 PARTIAL SYMPTOMATIC EPILEPSY WITH COMPLEX PARTIAL SEIZURES, NOT INTRACTABLE, WITHOUT STATUS EPILEPTICUS (HCC): Chronic | ICD-10-CM

## 2023-03-10 PROBLEM — K21.9 GASTROESOPHAGEAL REFLUX DISEASE: Chronic | Status: ACTIVE | Noted: 2019-08-09

## 2023-03-10 PROBLEM — E78.00 PURE HYPERCHOLESTEROLEMIA: Chronic | Status: ACTIVE | Noted: 2019-08-09

## 2023-03-10 PROBLEM — Z99.89 OSA ON CPAP: Chronic | Status: ACTIVE | Noted: 2022-11-09

## 2023-03-10 PROBLEM — E55.9 VITAMIN D DEFICIENCY: Chronic | Status: ACTIVE | Noted: 2019-08-09

## 2023-03-10 PROBLEM — R79.83 HOMOCYSTEINEMIA: Chronic | Status: ACTIVE | Noted: 2022-11-09

## 2023-03-10 PROBLEM — E72.11 HOMOCYSTINURIA (HCC): Status: RESOLVED | Noted: 2022-01-07 | Resolved: 2023-03-10

## 2023-03-10 PROBLEM — F51.04 PSYCHOPHYSIOLOGICAL INSOMNIA: Chronic | Status: ACTIVE | Noted: 2022-11-09

## 2023-03-10 PROBLEM — G47.33 OSA ON CPAP: Chronic | Status: ACTIVE | Noted: 2022-11-09

## 2023-03-10 PROBLEM — Z72.0 TOBACCO ABUSE: Chronic | Status: ACTIVE | Noted: 2022-01-07

## 2023-03-10 RX ORDER — GABAPENTIN 400 MG/1
CAPSULE ORAL
Qty: 120 CAPSULE | Refills: 1 | Status: SHIPPED | OUTPATIENT
Start: 2023-03-10 | End: 2023-05-09

## 2023-03-12 NOTE — PROGRESS NOTES
History of Present Illness:  Shaheen Bauer is a pleasant 38 y.o. male who presents for a post operative visit. He is 2 weeks out following a left cubital tunnel release and ulnar nerve transposition surgery. Overall He is doing okay and feels that their pain is well controlled. He has been in OT and denies setbacks but have been lifting heavy items. His 5th finger remains somewhat numb but is overall improving. He denies fevers, chills, numbness, tingling, and shortness of breath. Medical History:  Patient's medications, allergies, past medical, surgical, social and family histories were reviewed and updated as appropriate. No notes on file    Review of Systems  A 14 point review of systems was completed by the patient and is available in the media section of the scanned medical record and was reviewed on 3/12/2023. Vital Signs:  Vitals:    03/09/23 1520   Resp: 12       General/Appearance: Alert and oriented and in no apparent distress. Skin:  There are no skin lesions, cellulitis, or extreme edema. The patient has warm and well-perfused Bilateral upper extremities with brisk capillary refill. left Elbow Exam:    Inspection: incision is healed and is dry and intact with appropriate scabbing. Mild ecchymosis and swelling are present as can be expected. There is no erythema, drainage or other signs of infection. Palpation:  No crepitus to gentle motion of the elbow    Active Range of Motion: Full finger ROM, thumb extension, finger abduction, extension, and thumb apposition and flexion    Passive Range of Motion:   no pain with passive motion of the fingers/wrist    Strength:  5/5  strength, 4th/5th finger DIP flexion, finger abduction    Special Tests:  Deferred. Neurovascular: Sensation to light touch is intact, no motor deficits, palpable radial pulses 2+    Radiology:     No new imaging was obtained today. Assessment :  Mr. Shaheen Bauer is a pleasant 38 y.o. patient who is  2 weeks post left cubital tunnel release and ulnar nerve transposition surgery. No acute surgical concerns. Impression:  Encounter Diagnoses   Name Primary? Entrapment of left ulnar nerve Yes    Lateral epicondylitis of left elbow        Office Procedures:  No orders of the defined types were placed in this encounter. Treatment Plan:    Overall Xiomara Cheng is doing well. He can return to work as tolerated in 2 weeks. All of his  questions were fully answered today. We would like to see Xiomara Cheng back in  3 months  for follow-up visit and clinical check. Sincerely,    Thor Vigil MD [address] [facility] [address] [place] [address], [id number]  Email: [email or web address]  Office: [phone]    03/12/23  9:47 AM

## 2023-03-13 ENCOUNTER — OFFICE VISIT (OUTPATIENT)
Dept: FAMILY MEDICINE CLINIC | Age: 43
End: 2023-03-13
Payer: COMMERCIAL

## 2023-03-13 VITALS
SYSTOLIC BLOOD PRESSURE: 128 MMHG | DIASTOLIC BLOOD PRESSURE: 88 MMHG | WEIGHT: 230 LBS | HEIGHT: 68 IN | HEART RATE: 80 BPM | OXYGEN SATURATION: 98 % | BODY MASS INDEX: 34.86 KG/M2

## 2023-03-13 DIAGNOSIS — G40.209 PARTIAL SYMPTOMATIC EPILEPSY WITH COMPLEX PARTIAL SEIZURES, NOT INTRACTABLE, WITHOUT STATUS EPILEPTICUS (HCC): Chronic | ICD-10-CM

## 2023-03-13 DIAGNOSIS — Z79.899 LONG TERM USE OF DRUG: ICD-10-CM

## 2023-03-13 DIAGNOSIS — F31.78 BIPOLAR 1 DISORDER, MIXED, FULL REMISSION (HCC): ICD-10-CM

## 2023-03-13 DIAGNOSIS — F40.10 SOCIAL ANXIETY DISORDER: Primary | ICD-10-CM

## 2023-03-13 PROBLEM — E08.69: Status: ACTIVE | Noted: 2023-03-13

## 2023-03-13 LAB
ALCOHOL URINE: ABNORMAL
AMPHETAMINE SCREEN, URINE: ABNORMAL
BARBITURATE SCREEN, URINE: ABNORMAL
BENZODIAZEPINE SCREEN, URINE: ABNORMAL
BUPRENORPHINE URINE: ABNORMAL
COCAINE METABOLITE SCREEN URINE: ABNORMAL
FENTANYL SCREEN, URINE: ABNORMAL
GABAPENTIN SCREEN, URINE: ABNORMAL
MDMA URINE: ABNORMAL
METHADONE SCREEN, URINE: ABNORMAL
METHAMPHETAMINE, URINE: ABNORMAL
OPIATE SCREEN URINE: ABNORMAL
OXYCODONE SCREEN URINE: ABNORMAL
PHENCYCLIDINE SCREEN URINE: ABNORMAL
PROPOXYPHENE SCREEN, URINE: ABNORMAL
SYNTHETIC CANNABINOIDS(K2) SCREEN, URINE: ABNORMAL
THC SCREEN, URINE: ABNORMAL
TRAMADOL SCREEN URINE: ABNORMAL
TRICYCLIC ANTIDEPRESSANTS, UR: ABNORMAL

## 2023-03-13 PROCEDURE — G8484 FLU IMMUNIZE NO ADMIN: HCPCS | Performed by: NURSE PRACTITIONER

## 2023-03-13 PROCEDURE — 4004F PT TOBACCO SCREEN RCVD TLK: CPT | Performed by: NURSE PRACTITIONER

## 2023-03-13 PROCEDURE — G8427 DOCREV CUR MEDS BY ELIG CLIN: HCPCS | Performed by: NURSE PRACTITIONER

## 2023-03-13 PROCEDURE — 80305 DRUG TEST PRSMV DIR OPT OBS: CPT | Performed by: NURSE PRACTITIONER

## 2023-03-13 PROCEDURE — 3074F SYST BP LT 130 MM HG: CPT | Performed by: NURSE PRACTITIONER

## 2023-03-13 PROCEDURE — 3079F DIAST BP 80-89 MM HG: CPT | Performed by: NURSE PRACTITIONER

## 2023-03-13 PROCEDURE — G8417 CALC BMI ABV UP PARAM F/U: HCPCS | Performed by: NURSE PRACTITIONER

## 2023-03-13 PROCEDURE — 99214 OFFICE O/P EST MOD 30 MIN: CPT | Performed by: NURSE PRACTITIONER

## 2023-03-13 RX ORDER — ZIPRASIDONE HYDROCHLORIDE 60 MG/1
60 CAPSULE ORAL 2 TIMES DAILY WITH MEALS
Qty: 180 CAPSULE | Refills: 1 | Status: SHIPPED | OUTPATIENT
Start: 2023-03-13 | End: 2023-06-11

## 2023-03-13 RX ORDER — CLONAZEPAM 2 MG/1
2 TABLET ORAL 2 TIMES DAILY PRN
Qty: 30 TABLET | Refills: 0 | Status: SHIPPED | OUTPATIENT
Start: 2023-03-13 | End: 2023-04-12

## 2023-03-13 RX ORDER — ZIPRASIDONE HYDROCHLORIDE 40 MG/1
CAPSULE ORAL
Qty: 90 CAPSULE | Refills: 1 | Status: SHIPPED | OUTPATIENT
Start: 2023-03-13

## 2023-03-13 ASSESSMENT — ANXIETY QUESTIONNAIRES
4. TROUBLE RELAXING: 0
5. BEING SO RESTLESS THAT IT IS HARD TO SIT STILL: 0
2. NOT BEING ABLE TO STOP OR CONTROL WORRYING: 3
GAD7 TOTAL SCORE: 8
7. FEELING AFRAID AS IF SOMETHING AWFUL MIGHT HAPPEN: 0
1. FEELING NERVOUS, ANXIOUS, OR ON EDGE: 2
IF YOU CHECKED OFF ANY PROBLEMS ON THIS QUESTIONNAIRE, HOW DIFFICULT HAVE THESE PROBLEMS MADE IT FOR YOU TO DO YOUR WORK, TAKE CARE OF THINGS AT HOME, OR GET ALONG WITH OTHER PEOPLE: VERY DIFFICULT
3. WORRYING TOO MUCH ABOUT DIFFERENT THINGS: 3
6. BECOMING EASILY ANNOYED OR IRRITABLE: 0

## 2023-03-13 NOTE — PROGRESS NOTES
Precious Durbin (:  1980) is a 38 y.o. male,Established patient, here for evaluation of the following chief complaint(s): Anxiety (Follow up //Drug screen/)      SUBJECTIVE/OBJECTIVE:  HPI  Currently taking his Klonopin routinely as prescribed and states he only takes the prn dose at the most once monthly. States he feels as though his routine medication works and he does not need adjustments. He does have a medical marijuana card and uses edibles, states he only takes about 2 a day and it helps with his back pain but not his anxiety. Review of Systems   Constitutional: Negative. Physical Exam  Vitals reviewed. Constitutional:       Appearance: Normal appearance. He is obese. HENT:      Head: Normocephalic. Eyes:      General: Lids are normal.   Cardiovascular:      Rate and Rhythm: Normal rate and regular rhythm. Heart sounds: Normal heart sounds, S1 normal and S2 normal.   Pulmonary:      Effort: Pulmonary effort is normal.      Breath sounds: Normal breath sounds and air entry. Musculoskeletal:      Cervical back: Full passive range of motion without pain. Skin:     General: Skin is warm and dry. Neurological:      Mental Status: He is alert and oriented to person, place, and time. Psychiatric:         Attention and Perception: Attention and perception normal.         Mood and Affect: Mood and affect normal.         Speech: Speech normal.         Behavior: Behavior normal. Behavior is cooperative. Thought Content: Thought content normal.         Cognition and Memory: Cognition and memory normal.         Judgment: Judgment normal.       ASSESSMENT/PLAN:  1.  Social anxiety disorder -stable on current medications  Controlled substances monitoring: possible medication side effects, risk of tolerance and/or dependence, and alternative treatments discussed, no signs of potential drug abuse or diversion identified, OARRS report reviewed today- activity consistent with treatment plan, medication contract signed today, and random urine drug screen sent today. -     clonazePAM (KLONOPIN) 2 MG tablet  CATARINO 7 SCORE 3/13/2023   CATARINO-7 Total Score 8     Interpretation of CATARINO-7 score: 5-9 = mild anxiety, 10-14 = moderate anxiety, 15+ = severe anxiety. Recommend referral to behavioral health for scores 10 or greater. - per pt he is stable and feeling well, declined medication adjustment    2. Partial symptomatic epilepsy with complex partial seizures, not intractable, without status epilepticus ([facility]  Consistent - seizures well controlled   Controlled substances monitoring: possible medication side effects, risk of tolerance and/or dependence, and alternative treatments discussed, no signs of potential drug abuse or diversion identified, OARRS report reviewed today- activity consistent with treatment plan, medication contract signed today, and random urine drug screen sent today. Long term use of drug  -     POCT Rapid Drug Screen - medical marijuana card  -     clonazePAM (KLONOPIN) 1 MG tablet    3. Bipolar 1 disorder, mixed, full remission ([facility] - stable on current medications, continue as prescribed  - stable, no dose changes at this time  -     ziprasidone (GEODON) 60 MG capsule  -     ziprasidone (GEODON) 40 MG capsule        Return in about 3 months (around 6/13/2023) for anxiety - Evist.      An electronic signature was used to authenticate this note.     --MIKE Ross - CNP

## 2023-03-13 NOTE — LETTER
CONTROLLED SUBSTANCE MEDICATION AGREEMENT     Patient Name: Lucero Ruelas  Patient YOB: 1980   I understand, that controlled substance medications may be used to help better manage my symptoms and to improve my ability to function at home, work and in social settings. However, I also understand that these medications do have risks, which have been discussed with me, including possible development of physical or psychological dependence. I understand that successful treatment requires mutual trust and honesty between me and my provider. I understand and agree that following this Medication Agreement is necessary in continuing my provider-patient relationship and the success of my treatment plan. Explanation from my Provider: Benefits and Goals of Controlled Substance Medications: There are two potential goals for your treatment: (1) decreased pain and suffering (2) improved daily life functions. There are many possible treatments for your chronic condition(s). Alternatives such as physical therapy, yoga, massage, home daily exercise, meditation, relaxation techniques, injections, chiropractic manipulations, surgery, cognitive therapy, hypnosis and many medications that are not habit-forming may be used. Use of controlled substance medications may be helpful, but they are unlikely to resolve all symptoms or restore all function. Explanation from my Provider: Risks of Controlled Substance Medications:  Opioid pain medications: These medications can lead to problems such as addiction/dependence, sedation, lightheadedness/dizziness, memory issues, falls, constipation, nausea, or vomiting. They may also impair the ability to drive or operate machinery. Additionally, these medications may lower testosterone levels, leading to loss of bone strength, stamina and sex drive.   They may cause problems with breathing, sleep apnea and reduced coughing, which is especially dangerous for patients with lung disease. Overdose or dangerous interactions with alcohol and other medications may occur, leading to death. Hyperalgesia may develop, which means patients receiving opioids for the treatment of pain may become more sensitive to certain painful stimuli, and in some cases, experience pain from ordinarily non-painful stimuli. Women between the ages of 14-53 who could become pregnant should carefully weigh the risks and benefits of opioids with their physicians, as these medications increase the risk of pregnancy complications, including miscarriage,  delivery and stillbirth. It is also possible for babies to be born addicted to opioids. Opioid dependence withdrawal symptoms may include; feelings of uneasiness, increased pain, irritability, belly pain, diarrhea, sweats and goose-flesh. Benzodiazepines and non-benzodiazepine sleep medications: These medications can lead to problems such as addiction/dependence, sedation, fatigue, lightheadedness, dizziness, incoordination, falls, depression, hallucinations, and impaired judgment, memory and concentration. The ability to drive and operate machinery may also be affected. Abnormal sleep-related behaviors have been reported, including sleepwalking, driving, making telephone calls, eating, or having sex while not fully awake. These medications can suppress breathing and worsen sleep apnea, particularly when combined with alcohol or other sedating medications, potentially leading to death. Dependence withdrawal symptoms may include tremors, anxiety, hallucinations and seizures. Stimulants:  Common adverse effects include addiction/dependence, increased blood  pressure and heart rate, decreased appetite, nausea, involuntary weight loss, insomnia,                                                                                                                     Initials:_______   irritability, and headaches.   These risks may increase when these medications are combined with other stimulants, such as caffeine pills or energy drinks, certain weight loss supplements and oral decongestants. Dependence withdrawal symptoms may include depressed mood, loss of interest, suicidal thoughts, anxiety, fatigue, appetite changes and agitation. Testosterone replacement therapy:  Potential side effects include increased risk of stroke and heart attack, blood clots, increased blood pressure, increased cholesterol, enlarged prostate, sleep apnea, irritability/aggression and other mood disorders, and decreased fertility. I agree and understand that I and my prescriber have the following rights and responsibilities regarding my treatment plan:     1. MY RIGHTS:  To be informed of my treatment and medication plan. To be an active participant in my health and wellbeing. 2. MY RESPONSIBILITY AND UNDERSTANDING FOR USE OF MEDICATIONS   I will take medications at the dose and frequency as directed. For my safety, I will not increase or change how I take my medications without the recommendation of my healthcare provider.  I will actively participate in any program recommended by my provider which may improve function, including social, physical, psychological programs.  I will not take my medications with alcohol or other drugs not prescribed to me. I understand that drinking alcohol with my medications increases the chances of side effects, including reduced breathing rate and could lead to personal injury when operating machinery.  I understand that if I have a history of substance use disorders, including alcohol or other illicit drugs, that I may be at increased risk of addiction to my medications.  I agree to notify my provider immediately if I should become pregnant so that my treatment plan can be adjusted.    I agree and understand that I shall only receive controlled substance medications from the prescriber that signed this agreement unless there is written agreement among other prescribers of controlled substances outlining the responsibility of the medications being prescribed.  • I understand that the if the controlled medication is not helping to achieve goals, the dosage may be tapered and no longer prescribed.  3. MY RESPONSIBILITY FOR COMMUNICATION / PRESCRIPTION RENEWALS  • I agree that all controlled substance medications that I take will be prescribed only by my provider.  If another healthcare provider prescribes me medication in an emergency, I will notify my provider within seventy-two (72) hours.  • I will arrange for refills at the prescribed interval ONLY during regular office hours. I will not ask for refills earlier than agreed, after-hours, on holidays or weekends.  Refills may take up to 72 hours for processing and prescriptions to reach the pharmacy.  • I will inform my other health care providers that I am taking these medications and of the existence of this MEDICATION AGREEMENT. In the event of an emergency, I will provide the same information to the emergency department prescribers.  • I will keep my provider updated on the pharmacy I am using for controlled medication prescription filling.  Initials:_______  4. MY RESPONSIBILITY FOR PROTECTING MEDICATIONS  • I will protect my prescriptions and medications. I understand that lost or misplaced prescriptions will not be replaced.  • I will keep medications only for my own use and will not share them with others. I will keep all medications away from children.  • I agree that if my medications are adjusted or discontinued, I will properly dispose of any remaining medications.  I understand that I will be required to dispose of any remaining controlled medications as, directed by my prescriber, prior to being provided with any prescriptions for other controlled medications.  Medication drop box locations can be found at:  [employer]    5. MY RESPONSIBILITY WITH ILLEGAL DRUGS    I will not use illegal or street drugs or another person's prescription medications not prescribed to me.  If there are identified addiction type symptoms, then referral to a program may be provided by my provider and I agree to follow through with this recommendation. 6. MY RESPONSIBILITY FOR COOPERATION WITH INVESTIGATIONS   I understand that my provider will comply with any applicable law and may discuss my use and/or possible misuse/abuse of controlled substances and alcohol, as appropriate, with any health care provider involved in my care, pharmacist, or legal authority.  I authorize my provider and pharmacy to cooperate fully with law enforcement agencies (as permitted by law) in the investigation of any possible misuse, sale, or other diversion of my controlled substances.  I agree to waive any applicable privilege or right of privacy or confidentiality with respect to these authorizations. 7. PROVIDERS RIGHT TO MONITOR FOR SAFETY: PRESCRIPTION MONITORING / DRUG TESTING   I consent to drug/toxicology screening and will submit to a drug screen upon my providers request to assure I am only taking the prescribed drugs for my safety monitoring. I understand that a drug screen is a laboratory test in which a sample of my urine, blood or saliva is checked to see what drugs I have been taking. This may entail an observed urine specimen, which means that a nurse or other health care provider may watch me provide urine, and I will cooperate if I am asked to provide an observed specimen.  I understand that my provider will check a copy of my State Prescription Monitoring Program () Report in order to safely prescribe medications.  Pill Counts: I consent to pill counts when requested.   I may be asked to bring all my prescribed controlled substance medications, in their original bottles, to all of my scheduled appointments. In addition, my provider may ask me to come to the practice at any time for a random pill count. 8. TERMINATION OF THIS AGREEMENT  For my safety, my prescriber has the right to stop prescribing controlled substance medications and may end this agreement. Initials:_______   Conditions that may result in termination of this agreement:  a. I do not show any improvement in pain, or my activity has not improved. b. I develop rapid tolerance or loss of improvement, as described in my treatment plan.  c. I develop significant side effects from the medication. d. My behavior is not consistent with the responsibilities outlined above, thereby causing safety concerns to continue prescribing controlled substance medications. e. I fail to follow the terms of this agreement. f. Other:____________________________       UNDERSTANDING THIS MEDICATION AGREEMENT:    I have read the above and have had all my questions answered. For chronic disease management, I know that my symptoms can be managed with many types of treatments. A chronic medication trial may be part of my treatment, but I must be an active participant in my care. Medication therapy is only one part of my symptom management plan. In some cases, there may be limited scientific evidence to support the chronic use of certain medications to improve symptoms and daily function. Furthermore, in certain circumstances, there may be scientific information that suggests that the use of chronic controlled substances may worsen my symptoms and increase my risk of unintentional death directly related to this medication therapy. I know that if my provider feels my risk from controlled medications is greater than my benefit, I will have my controlled substance medication(s) compassionately lowered or removed altogether.      I further agree to allow this office to contact my HIPAA contact if there are concerns about my safety and use of the controlled medications. I have agreed to use the prescribed controlled substance medications to me as instructed by my provider and as stated in this Medication Agreement. My initial on each page and my signature below shows that I have read each page and I have had the opportunity to ask questions with answers provided by my provider.     Patient Name (Printed): _____________________________________  Patient Signature:  ______________________   Date: _____________    Prescriber Name (Printed): ___________________________________  Prescriber Signature: _____________________  Date: _____________

## 2023-03-13 NOTE — LETTER
CONTROLLED SUBSTANCE MEDICATION AGREEMENT     Patient Name: Cori Skinner  Patient YOB: 1980   I understand, that controlled substance medications may be used to help better manage my symptoms and to improve my ability to function at home, work and in social settings. However, I also understand that these medications do have risks, which have been discussed with me, including possible development of physical or psychological dependence. I understand that successful treatment requires mutual trust and honesty between me and my provider. I understand and agree that following this Medication Agreement is necessary in continuing my provider-patient relationship and the success of my treatment plan. Explanation from my Provider: Benefits and Goals of Controlled Substance Medications: There are two potential goals for your treatment: (1) decreased pain and suffering (2) improved daily life functions. There are many possible treatments for your chronic condition(s). Alternatives such as physical therapy, yoga, massage, home daily exercise, meditation, relaxation techniques, injections, chiropractic manipulations, surgery, cognitive therapy, hypnosis and many medications that are not habit-forming may be used. Use of controlled substance medications may be helpful, but they are unlikely to resolve all symptoms or restore all function. Explanation from my Provider: Risks of Controlled Substance Medications:  Opioid pain medications: These medications can lead to problems such as addiction/dependence, sedation, lightheadedness/dizziness, memory issues, falls, constipation, nausea, or vomiting. They may also impair the ability to drive or operate machinery. Additionally, these medications may lower testosterone levels, leading to loss of bone strength, stamina and sex drive.   They may cause problems with breathing, sleep apnea and reduced coughing, which is especially dangerous for patients with lung disease. Overdose or dangerous interactions with alcohol and other medications may occur, leading to death. Hyperalgesia may develop, which means patients receiving opioids for the treatment of pain may become more sensitive to certain painful stimuli, and in some cases, experience pain from ordinarily non-painful stimuli. Women between the ages of 14-53 who could become pregnant should carefully weigh the risks and benefits of opioids with their physicians, as these medications increase the risk of pregnancy complications, including miscarriage,  delivery and stillbirth. It is also possible for babies to be born addicted to opioids. Opioid dependence withdrawal symptoms may include; feelings of uneasiness, increased pain, irritability, belly pain, diarrhea, sweats and goose-flesh. Benzodiazepines and non-benzodiazepine sleep medications: These medications can lead to problems such as addiction/dependence, sedation, fatigue, lightheadedness, dizziness, incoordination, falls, depression, hallucinations, and impaired judgment, memory and concentration. The ability to drive and operate machinery may also be affected. Abnormal sleep-related behaviors have been reported, including sleepwalking, driving, making telephone calls, eating, or having sex while not fully awake. These medications can suppress breathing and worsen sleep apnea, particularly when combined with alcohol or other sedating medications, potentially leading to death. Dependence withdrawal symptoms may include tremors, anxiety, hallucinations and seizures. Stimulants:  Common adverse effects include addiction/dependence, increased blood  pressure and heart rate, decreased appetite, nausea, involuntary weight loss, insomnia,                                                                                                                     Initials:_______   irritability, and headaches.   These risks may increase when these medications are combined with other stimulants, such as caffeine pills or energy drinks, certain weight loss supplements and oral decongestants. Dependence withdrawal symptoms may include depressed mood, loss of interest, suicidal thoughts, anxiety, fatigue, appetite changes and agitation. Testosterone replacement therapy:  Potential side effects include increased risk of stroke and heart attack, blood clots, increased blood pressure, increased cholesterol, enlarged prostate, sleep apnea, irritability/aggression and other mood disorders, and decreased fertility. I agree and understand that I and my prescriber have the following rights and responsibilities regarding my treatment plan:     1. MY RIGHTS:  To be informed of my treatment and medication plan. To be an active participant in my health and wellbeing. 2. MY RESPONSIBILITY AND UNDERSTANDING FOR USE OF MEDICATIONS   I will take medications at the dose and frequency as directed. For my safety, I will not increase or change how I take my medications without the recommendation of my healthcare provider.  I will actively participate in any program recommended by my provider which may improve function, including social, physical, psychological programs.  I will not take my medications with alcohol or other drugs not prescribed to me. I understand that drinking alcohol with my medications increases the chances of side effects, including reduced breathing rate and could lead to personal injury when operating machinery.  I understand that if I have a history of substance use disorders, including alcohol or other illicit drugs, that I may be at increased risk of addiction to my medications.  I agree to notify my provider immediately if I should become pregnant so that my treatment plan can be adjusted.    I agree and understand that I shall only receive controlled substance medications from the prescriber that signed this agreement unless there is written agreement among other prescribers of controlled substances outlining the responsibility of the medications being prescribed.  I understand that the if the controlled medication is not helping to achieve goals, the dosage may be tapered and no longer prescribed. 3. MY RESPONSIBILITY FOR COMMUNICATION / PRESCRIPTION RENEWALS   I agree that all controlled substance medications that I take will be prescribed only by my provider. If another healthcare provider prescribes me medication in an emergency, I will notify my provider within seventy-two (72) hours.  I will arrange for refills at the prescribed interval ONLY during regular office hours. I will not ask for refills earlier than agreed, after-hours, on holidays or weekends. Refills may take up to 72 hours for processing and prescriptions to reach the pharmacy.  I will inform my other health care providers that I am taking these medications and of the existence of this [address]. In the event of an emergency, I will provide the same information to the emergency department prescribers.  I will keep my provider updated on the pharmacy I am using for controlled medication prescription filling. Initials:_______  4. MY RESPONSIBILITY FOR PROTECTING MEDICATIONS   I will protect my prescriptions and medications. I understand that lost or misplaced prescriptions will not be replaced.  I will keep medications only for my own use and will not share them with others. I will keep all medications away from children.  I agree that if my medications are adjusted or discontinued, I will properly dispose of any remaining medications. I understand that I will be required to dispose of any remaining controlled medications as, directed by my prescriber, prior to being provided with any prescriptions for other controlled medications.   Medication drop box locations can be found at: [email or web address]    5. MY RESPONSIBILITY WITH ILLEGAL DRUGS    I will not use illegal or street drugs or another person's prescription medications not prescribed to me.  If there are identified addiction type symptoms, then referral to a program may be provided by my provider and I agree to follow through with this recommendation. 6. MY RESPONSIBILITY FOR COOPERATION WITH INVESTIGATIONS   I understand that my provider will comply with any applicable law and may discuss my use and/or possible misuse/abuse of controlled substances and alcohol, as appropriate, with any health care provider involved in my care, pharmacist, or legal authority.  I authorize my provider and pharmacy to cooperate fully with law enforcement agencies (as permitted by law) in the investigation of any possible misuse, sale, or other diversion of my controlled substances.  I agree to waive any applicable privilege or right of privacy or confidentiality with respect to these authorizations. 7. PROVIDERS RIGHT TO MONITOR FOR SAFETY: PRESCRIPTION MONITORING / DRUG TESTING   I consent to drug/toxicology screening and will submit to a drug screen upon my providers request to assure I am only taking the prescribed drugs for my safety monitoring. I understand that a drug screen is a laboratory test in which a sample of my urine, blood or saliva is checked to see what drugs I have been taking. This may entail an observed urine specimen, which means that a nurse or other health care provider may watch me provide urine, and I will cooperate if I am asked to provide an observed specimen.  I understand that my provider will check a copy of my State Prescription Monitoring Program () Report in order to safely prescribe medications.  Pill Counts: I consent to pill counts when requested.   I may be asked to bring all my prescribed controlled substance medications, in their original bottles, to all of my scheduled appointments. In addition, my provider may ask me to come to the practice at any time for a random pill count. 8. TERMINATION OF THIS AGREEMENT  For my safety, my prescriber has the right to stop prescribing controlled substance medications and may end this agreement. Initials:_______   Conditions that may result in termination of this agreement:  a. I do not show any improvement in pain, or my activity has not improved. b. I develop rapid tolerance or loss of improvement, as described in my treatment plan.  c. I develop significant side effects from the medication. d. My behavior is not consistent with the responsibilities outlined above, thereby causing safety concerns to continue prescribing controlled substance medications. e. I fail to follow the terms of this agreement. f. Other:____________________________       UNDERSTANDING THIS MEDICATION AGREEMENT:    I have read the above and have had all my questions answered. For chronic disease management, I know that my symptoms can be managed with many types of treatments. A chronic medication trial may be part of my treatment, but I must be an active participant in my care. Medication therapy is only one part of my symptom management plan. In some cases, there may be limited scientific evidence to support the chronic use of certain medications to improve symptoms and daily function. Furthermore, in certain circumstances, there may be scientific information that suggests that the use of chronic controlled substances may worsen my symptoms and increase my risk of unintentional death directly related to this medication therapy. I know that if my provider feels my risk from controlled medications is greater than my benefit, I will have my controlled substance medication(s) compassionately lowered or removed altogether.      I further agree to allow this office to contact my HIPAA contact if there are concerns about my safety and use of the controlled medications.   I have agreed to use the prescribed controlled substance medications to me as instructed by my provider and as stated in this Medication Agreement. My initial on each page and my signature below shows that I have read each page and I have had the opportunity to ask questions with answers provided by my provider.    Patient Name (Printed): _____________________________________  Patient Signature:  ______________________   Date: _____________    Prescriber Name (Printed): ___________________________________  Prescriber Signature: _____________________  Date: _____________

## 2023-03-13 NOTE — PATIENT INSTRUCTIONS
GENERAL OFFICE POLICIES      Telephone Calls: Messages will be answered within 1-2 business days, unless the provider is out of the office. If it is urgent a covering provider will answer. (this does not include Medication refills). MyChart: We recommend all patients sign up for [employer]. Through this portal you can see your lab results, request refills, schedule appointments, pay your bill and send messages to the office. [employer] messages will be answered within 1-2 business days unless the provider is out of the office. For urgent matters, please call the office. Appointments:  All appointments must be scheduled. We ask all patients to schedule their next follow up appointment before they leave the office to make sure you will be able to be seen before you run out of medications. 24 hours notice is required to cancel or reschedule an appointment to avoid being marked as a no show. You may be dismissed from the practice after 3 no shows. LATE for Appointment: If you are 15 or more minutes late for your appointment, you may be asked to reschedule. MA/LAB APPTS: Must be scheduled, cannot accept walk in lab visits. We only draw labs for patients established in our office. We only do injections for medications ordered by our office. Acute Sick Visits:  Nothing other than acute complaint will be addressed at this visit. TRADITIONAL MEDICARE  DOES NOT COVER PHYSICALS  MEDICARE WELLNESS VISITS: These are NOT physicals but the free annual visit offered by Medicare to discuss wellness issues. Medication refills, checkups, etc. will not be addressed during this visit. Medication Refills: Refills are handled electronically so please contact your pharmacy for medication refills even if current refills have been exhausted. If you are on a controlled medication you will be referred to a specialist (pain specialist, psychiatry, etc). Forms:  There is a $35 fee to fill out FMLA/Disability paperwork, payable at time of . Instead of the fee, you can choose to have the paperwork filled out during a separate office visit that is for filling out the paperwork only. Medication Samples: This office does not carry medication samples. If you need assistance in getting your medications, then please let the medical assistant know so they can help you sign up for a drug assistance program that can help get medications at a reduced cost or even free (if you qualify). Workman's Comp Claims: We do not handle workman's comp cases or claims. You will need to go to an urgent care to be seen or to whomever your employer uses. General - Any abusive/rude behavior toward staff/providers may be cause for dismissal.  Paulo Tracey may receive a survey regarding the care you received during your visit. Your input is valuable to us. We encourage you to complete and return your survey. We hope you will choose us in the future for your healthcare needs.

## 2023-03-15 ENCOUNTER — HOSPITAL ENCOUNTER (OUTPATIENT)
Dept: OCCUPATIONAL THERAPY | Age: 43
Setting detail: THERAPIES SERIES
Discharge: HOME | End: 2023-03-15
Payer: OTHER MISCELLANEOUS

## 2023-03-15 PROCEDURE — 97110 THERAPEUTIC EXERCISES: CPT | Performed by: OCCUPATIONAL THERAPIST

## 2023-03-15 NOTE — FLOWSHEET NOTE
[address] [facility], [address] [place] [address] [place], [place]  Phone: [phone]  Fax [phone]    Occupational Therapy Treatment Note/ Progress Report:     Is this a Progress Report:     []  Yes  [x]  No      If Yes:  Date Range for reporting period:  Beginning 23  Ending 3/15/2023  Date:  3/15/2023  Patient Name:  Paulo Almeida    :  1980  MRN: [id number]  Medical/Treatment Diagnosis Information:  Diagnosis: L ulnar nerve decompression with transposition L elbow pain M25.522         Insurance/Certification information:   [facility]  Physician Information:   arjun  Has the plan of care been signed (Y/N):        []  Yes  [x]  No     Visit # Insurance Allowable Auth Required   3  []  Yes []  No    From 23  to 3/15/2023    Date of Injury: na  Date of Surgery: 23     Date of Patient follow up with Physician:      RESTRICTIONS/PRECAUTIONS: none       Latex Allergy:  [x]No      []Yes                    Pacemaker:  [x] No       [] Yes      Preferred Language for Healthcare:   [x]English       []other:      Functional Scale: 87% disability (Quick DASH)                            Date assessed:  2023     C-SSRS Triggered by Intake questionnaire (Past 2 wk assessment):    No, Questionnaire did not trigger screening. SUBJECTIVE: Got a job at [employer] but I don't know if I'm ready to go back      Patient reported deficits/history of current problem: progressive onset      Pain Scale: 3/10       [x]Constant                []Intermittent              []other:  Pain Location:  elbow   Easing factors: ice  Provocative factors:       [x] Patient reported history, allergies, and medications reviewed - see intake form.         Comorbidities Affecting Functional Performance:             [x]Anxiety (F41.9)/Depression (F32.9)           []Hypertension  []Diabetes Type 1(E10.65) or 2 (E11.65)     []CVA   []Rheumatoid Arthritis (M05.9)                     []Aherisclerosis  []Fibromyalgia (M79.7)                                 []Angina Pectoris  []Neuropathy(G60.9)                                    []Disc Pathology  []Osteoarthritis(M19.91)                               []Osteoporosis  []None                                                           []Morbid Obesity  [x]Other:  substance abuse                                                       []COPD     OBJECTIVE:   Date:  Hand Dominance:     [x]  Right    [] Left 2/23/2023    3/8/23  3/15/23    Objective Measures:       PAIN 6-7/10     Quick DASH 86%                                     Digits tip to DPFC in cm Index:  Long:  Ring:  Small:     Thumb ROM MP  IP     Thumb opposition  R:  L:     Thumb Radial/Palmar abd ROM R:  L:     Wrist ROM Ext/Flex R:  L:     Rad/Uln dev ROM R:  L:     Forearm ROM  Sup/pron R:sup 80  L: sup 60     Elbow ROM Ext/flex R: 0/135  L: 20/95 WNL  WNL    Shoulder Flex  Shoulder Abd  Shoulder IR/ER       Edema in cm circumf.  MCPJs R:  L:     Edema in cm circumf.  Wrist R:  L:      strength in lbs R:  L: R: 106#  L: 20#   L: 41.2#   Pinch Strengthin lbs: lat  R:  L:     Pinch Strength in lbs:  3 point R:  L:        MMT:        Observations:  (including splints, bandages, incisions, scars):       Sensation:  [] No reported deficits                       [] Intact to light touch                          [] Chester Chapis test completed, findings as noted:  [x] Other: N and T in ring and small finger but better after sx        Occupational Profile:  Home Enviroment: lives with  [] spouse,  [] family,  [x] alone,  [] significant other,   [] other:           MODALITIES: 2/23/23  3/8/23  3/15/23    Fluidotherapy (77105)      Estim (53271/67325)      Paraffin (99363)      US (32882)      Iontophoresis (98833)      Hot Pack      Cold Pack  8' after tx  8'         INTERVENTIONS:      Therapeutic Exercise (24677) Issued HEP see media file. Instructed to  wait 3 more days before starting exercises. Gripping putty   5' Airdyne 5'   Flex bar   Red x 20 each  Red x 20 each    S/p   2 wts 10 x 2  2 wts x 10 x 2    Forearm stretching   X 10 each  X 10 each    Therapeutic Activity (69590)  Ulnar n stretch x 5  X 5      Elbow AROM x 15  X 15       Power  #3 x 20 x 2    Manual Therapy (66576)      (IASTM, Dry Needling, manual mobilization)            Neuromuscular Reeducation (89759) Edu on use of ice                  ADL Training (10881)                  HEP Training/Review See sheet(s)                 Splinting      Lcode:      Orthotic Mgmt, Subsequent Enc (43736)      Orthotic Mgmt & Training (81248)            Other: Has compression sleeve        Therapeutic Exercise & NMR:  [x] (38010) Provided verbal/tactile cueing for activities related to strengthening, flexibility, endurance, ROM  for improvements in scapular, scapulothoracic and UE control with self care, reaching, carrying, lifting, house/yardwork, driving/computer work.    [] (44369) Provided verbal/tactile cueing for activities related to improving balance, coordination, kinesthetic sense, posture, motor skill, proprioception  to assist with  scapular, scapulothoracic and UE control with self care, reaching, carrying, lifting, house/yardwork, driving/computer work.     Therapeutic Activities & NMR:    [] (26413 or 43894) Provided verbal/tactile cueing for activities related to improving balance, coordination, kinesthetic sense, posture, motor skill, proprioception and motor activation to allow for proper function of scapular, scapulothoracic and UE control with self care, carrying, lifting, driving/computer work    Home Exercise Program:    [x] (65291) Reviewed/Progressed HEP activities related to strengthening, flexibility, endurance, ROM of scapular, scapulothoracic and UE control with self care, reaching, carrying, lifting, house/yardwork, driving/computer work  [] (92904) Reviewed/Progressed HEP activities related to improving balance, coordination, kinesthetic sense, posture, motor skill, proprioception of scapular, scapulothoracic and UE control with self care, reaching, carrying, lifting, house/yardwork, driving/computer work      Manual Treatments:  PROM / STM / Oscillations-Mobs:  G-I, II, III, IV (PA's, Inf., Post.)  [] (87003) Provided manual therapy to mobilize soft tissue/joints of cervical/CT, scapular GHJ and UE for the purpose of modulating pain, promoting relaxation,  increasing ROM, reducing/eliminating soft tissue swelling/inflammation/restriction, improving soft tissue extensibility and allowing for proper ROM for normal function with self care, reaching, carrying, lifting, house/yardwork, driving/computer work    ADL Training:  [] (72928) Provided self-care/home management training related to activities of daily living and compensatory training, and/or use of adaptive equipment      Charges:  Timed Code Treatment Minutes: 23   Total Treatment Minutes: 30   Worker's Comp: Time In/Time Out     [] EVAL (LOW) 22261 (typically 20 minutes face-to-face)    [] EVAL (MOD) 30856 (typically 30 minutes face-to-face)  [] EVAL (HIGH) 68521 (typically 45 minutes face-to-face)  [] OT Re-eval (87222)       [x] Reddy [phone]) x  2    [] DXFWF(02565)  [] NMR (86291) x      [] Estim (attended) (54062)   [] Manual ([id number]) x      [] US (29586)  [] TA (42735) x      [] Paraffin (46729)  [] ADL  (00002) x     [] Splint/L code:    [] Estim (unattended) ([phone])  [] Fluidotherapy (21801)  [] DN 1-2 (57901)   [] DN 3+ (63261)  [] Orthotic Mgmt, Subsequent Enc (43309)  [] Orthotic Mgmt & Training (45893)  [] Other:      Overall Progression Towards Functional Goals/Treatment Progress Update:  [x] Patient is progressing as expected towards functional goals listed. [] Progression is slowed due to complexities/impairments listed. [] Progression has been slowed due to co-morbidities.   [] Plan just implemented, too soon to assess goals progression <30 days  [] Goals require adjustment due to lack of progress  [] Patient is not progressing as expected and requires additional follow up with physician  [] All goals are met  [] Other:     Prognosis for POC: [x] Good [] Fair  [] Poor    Patient requires continued skilled intervention: [x] Yes  [] No    Treatment/Activity Tolerance:  [x] Patient able to complete treatment  [] Patient limited by fatigue  [] Patient limited by pain    [] Patient limited by other medical complications  [] Other:                  PLAN: See eval  [x] Continue per plan of care [] Alter current plan (see comments above)  [] Plan of care initiated [] Hold pending MD visit [] Discharge    Electronically signed by:  Trina Cruz OT, [id number]    Note: If patient does not return for scheduled/ recommended follow up visits, this note will serve as a discharge from care along with most recent update on progress.

## 2023-03-16 ENCOUNTER — APPOINTMENT (OUTPATIENT)
Dept: OCCUPATIONAL THERAPY | Age: 43
End: 2023-03-16
Payer: OTHER MISCELLANEOUS

## 2023-03-22 ENCOUNTER — HOSPITAL ENCOUNTER (OUTPATIENT)
Dept: OCCUPATIONAL THERAPY | Age: 43
Setting detail: THERAPIES SERIES
Discharge: HOME | End: 2023-03-22
Payer: OTHER MISCELLANEOUS

## 2023-03-22 PROCEDURE — 97110 THERAPEUTIC EXERCISES: CPT | Performed by: OCCUPATIONAL THERAPIST

## 2023-03-22 NOTE — FLOWSHEET NOTE
[address] [facility], [address] [place] [address] Roberto Michaud  Phone: [phone]  Fax [phone]    Occupational Therapy Treatment Note/ Progress Report:     Is this a Progress Report:     [x]  Yes  []  No      If Yes:  Date Range for reporting period:  Beginning 23  Ending 3/22/2023  Date:  3/22/2023  Patient Name:  Ty Mc    :  1980  MRN: [id number]  Medical/Treatment Diagnosis Information:  Diagnosis: L ulnar nerve decompression with transposition L elbow pain M25.522         Insurance/Certification information:   [facility]  Physician Information:   arjun  Has the plan of care been signed (Y/N):        []  Yes  [x]  No     Visit # Insurance Allowable Auth Required   4  []  Yes []  No    From 23  to 3/22/2023    Date of Injury: na  Date of Surgery: 23     Date of Patient follow up with Physician:      RESTRICTIONS/PRECAUTIONS: none       Latex Allergy:  [x]No      []Yes                    Pacemaker:  [x] No       [] Yes      Preferred Language for Healthcare:   [x]English       []other:      Functional Scale: 87% disability (Quick DASH)                            Date assessed:  2023     C-SSRS Triggered by Intake questionnaire (Past 2 wk assessment):    No, Questionnaire did not trigger screening. SUBJECTIVE: Getting better. Using the arm for more things     Patient reported deficits/history of current problem: progressive onset      Pain Scale: 3/10       [x]Constant                []Intermittent              []other:  Pain Location:  elbow   Easing factors: ice  Provocative factors:       [x] Patient reported history, allergies, and medications reviewed - see intake form.         Comorbidities Affecting Functional Performance:             [x]Anxiety (F41.9)/Depression (F32.9)           []Hypertension  []Diabetes Type 1(E10.65) or 2 (E11.65)     []CVA   []Rheumatoid Arthritis (M05.9)

## 2023-03-24 ENCOUNTER — TELEPHONE (OUTPATIENT)
Dept: ORTHOPEDIC SURGERY | Age: 43
End: 2023-03-24

## 2023-03-24 NOTE — TELEPHONE ENCOUNTER
Patient last seen 03/09/2023 and medication last filled 03/09/2023:      Assessment :  Mr. Briseida Wick is a pleasant 38 y.o. patient who is  2 weeks post left cubital tunnel release and ulnar nerve transposition surgery. No acute surgical concerns. Impression:       Encounter Diagnoses   Name Primary? Entrapment of left ulnar nerve Yes    Lateral epicondylitis of left elbow           Office Procedures:  No orders of the defined types were placed in this encounter. Treatment Plan:    Overall Briseida Wick is doing well. He can return to work as tolerated in 2 weeks. All of his  questions were fully answered today. We would like to see Briseida Wick back in  3 months  for follow-up visit and clinical check.

## 2023-03-28 DIAGNOSIS — F31.78 BIPOLAR 1 DISORDER, MIXED, FULL REMISSION (HCC): ICD-10-CM

## 2023-03-28 RX ORDER — LAMOTRIGINE 200 MG/1
TABLET ORAL
Qty: 60 TABLET | Refills: 0 | Status: SHIPPED | OUTPATIENT
Start: 2023-03-28

## 2023-03-28 NOTE — TELEPHONE ENCOUNTER
LV 3/13/23 WITH CC FOR ANXIETY NV NONE  Return in about 3 months (around 6/13/2023) for anxiety - Evist.

## 2023-03-29 ENCOUNTER — APPOINTMENT (OUTPATIENT)
Dept: OCCUPATIONAL THERAPY | Age: 43
End: 2023-03-29
Payer: OTHER MISCELLANEOUS

## 2023-04-19 DIAGNOSIS — T50.905A EXTRAPYRAMIDAL MOVEMENT DISORDER, DRUG-INDUCED: ICD-10-CM

## 2023-04-19 DIAGNOSIS — G25.89 EXTRAPYRAMIDAL MOVEMENT DISORDER, DRUG-INDUCED: ICD-10-CM

## 2023-04-19 RX ORDER — TRIHEXYPHENIDYL HYDROCHLORIDE 2 MG/1
TABLET ORAL
Qty: 60 TABLET | Refills: 2 | Status: SHIPPED | OUTPATIENT
Start: 2023-04-19

## 2023-05-05 DIAGNOSIS — F40.10 SOCIAL ANXIETY DISORDER: Chronic | ICD-10-CM

## 2023-05-05 DIAGNOSIS — G40.209 PARTIAL SYMPTOMATIC EPILEPSY WITH COMPLEX PARTIAL SEIZURES, NOT INTRACTABLE, WITHOUT STATUS EPILEPTICUS (HCC): Chronic | ICD-10-CM

## 2023-05-06 RX ORDER — CLONAZEPAM 1 MG/1
TABLET ORAL
Qty: 60 TABLET | Refills: 0 | Status: SHIPPED | OUTPATIENT
Start: 2023-05-06 | End: 2023-06-05

## 2023-05-06 NOTE — TELEPHONE ENCOUNTER
Partial symptomatic epilepsy with complex partial seizures, not intractable, without status epilepticus (HCC)  -     clonazePAM (KLONOPIN) 1 MG tablet; TAKE ONE (1) TABLET BY MOUTH TWO (2) TIMES DAILY FOR 30 DAYS. Social anxiety disorder  -     clonazePAM (KLONOPIN) 1 MG tablet; TAKE ONE (1) TABLET BY MOUTH TWO (2) TIMES DAILY FOR 30 DAYS. Controlled Substance Monitoring:  Acute and Chronic Pain Monitoring:   RX Monitoring 5/6/2023   Attestation -   Acute Pain Prescriptions -   Periodic Controlled Substance Monitoring No signs of potential drug abuse or diversion identified.

## 2023-05-11 ENCOUNTER — OFFICE VISIT (OUTPATIENT)
Dept: ORTHOPEDIC SURGERY | Age: 43
End: 2023-05-11

## 2023-05-11 VITALS — WEIGHT: 230 LBS | HEIGHT: 68 IN | BODY MASS INDEX: 34.86 KG/M2

## 2023-05-11 DIAGNOSIS — G56.22 ENTRAPMENT OF LEFT ULNAR NERVE: Primary | ICD-10-CM

## 2023-05-13 NOTE — PROGRESS NOTES
History of Present Illness:  Deepti Cao is a pleasant 38 y.o. male who presents for a post operative visit. He is 11 weeks out following a left cubital tunnel release and ulnar nerve transposition surgery. Overall He is doing well. He has returned to work at [employer]. He has finished PT/OT. His 5th finger remains somewhat numb but is overall improving. He denies fevers, chills, numbness, tingling, and shortness of breath. Medical History:  Patient's medications, allergies, past medical, surgical, social and family histories were reviewed and updated as appropriate. No notes on file    Review of Systems  A 14 point review of systems was completed by the patient and is available in the media section of the scanned medical record and was reviewed on 5/13/2023. Vital Signs: There were no vitals filed for this visit. General/Appearance: Alert and oriented and in no apparent distress. Skin:  There are no skin lesions, cellulitis, or extreme edema. The patient has warm and well-perfused Bilateral upper extremities with brisk capillary refill. left Elbow Exam:    Inspection: incision is well healed. There is no erythema, drainage or other signs of infection. Palpation:  No crepitus to gentle motion of the elbow    Active Range of Motion: Full finger ROM, thumb extension, finger abduction, extension, and thumb apposition and flexion    Passive Range of Motion:   no pain with passive motion of the fingers/wrist    Strength:  5/5  strength, 4th/5th finger DIP flexion, finger abduction    Special Tests:  Deferred. Neurovascular: Sensation to light touch is intact, no motor deficits, palpable radial pulses 2+    Radiology:     No new imaging was obtained today. Assessment :  Mr. Deepti Cao is a pleasant 38 y.o. patient who is  11 weeks post left cubital tunnel release and ulnar nerve transposition surgery. Doing well postoperatively.     Impression:  No diagnosis

## 2023-05-25 DIAGNOSIS — F31.78 BIPOLAR 1 DISORDER, MIXED, FULL REMISSION (HCC): ICD-10-CM

## 2023-05-25 RX ORDER — LAMOTRIGINE 200 MG/1
TABLET ORAL
Qty: 60 TABLET | Refills: 0 | Status: SHIPPED | OUTPATIENT
Start: 2023-05-25

## 2023-06-12 DIAGNOSIS — F31.78 BIPOLAR 1 DISORDER, MIXED, FULL REMISSION (HCC): ICD-10-CM

## 2023-06-12 RX ORDER — ZIPRASIDONE HYDROCHLORIDE 60 MG/1
60 CAPSULE ORAL 2 TIMES DAILY WITH MEALS
Qty: 180 CAPSULE | Refills: 1 | Status: SHIPPED | OUTPATIENT
Start: 2023-06-12 | End: 2023-09-10

## 2023-06-12 RX ORDER — ZIPRASIDONE HYDROCHLORIDE 40 MG/1
CAPSULE ORAL
Qty: 90 CAPSULE | Refills: 1 | Status: SHIPPED | OUTPATIENT
Start: 2023-06-12

## 2023-06-20 DIAGNOSIS — F31.78 BIPOLAR 1 DISORDER, MIXED, FULL REMISSION (HCC): ICD-10-CM

## 2023-06-20 RX ORDER — LAMOTRIGINE 200 MG/1
TABLET ORAL
Qty: 60 TABLET | Refills: 2 | Status: SHIPPED | OUTPATIENT
Start: 2023-06-20

## 2023-06-26 DIAGNOSIS — G40.209 PARTIAL SYMPTOMATIC EPILEPSY WITH COMPLEX PARTIAL SEIZURES, NOT INTRACTABLE, WITHOUT STATUS EPILEPTICUS (HCC): Chronic | ICD-10-CM

## 2023-06-26 RX ORDER — GABAPENTIN 400 MG/1
CAPSULE ORAL
Qty: 120 CAPSULE | Refills: 1 | Status: SHIPPED | OUTPATIENT
Start: 2023-06-26 | End: 2023-08-25

## 2023-06-26 RX ORDER — GABAPENTIN 400 MG/1
CAPSULE ORAL
Qty: 120 CAPSULE | Refills: 1 | Status: CANCELLED | OUTPATIENT
Start: 2023-06-26 | End: 2023-08-25

## 2023-06-28 ENCOUNTER — OFFICE VISIT (OUTPATIENT)
Dept: ORTHOPEDIC SURGERY | Age: 43
End: 2023-06-28

## 2023-06-28 VITALS — BODY MASS INDEX: 33.34 KG/M2 | WEIGHT: 220 LBS | HEIGHT: 68 IN

## 2023-06-28 DIAGNOSIS — M25.512 CHRONIC LEFT SHOULDER PAIN: ICD-10-CM

## 2023-06-28 DIAGNOSIS — G89.29 CHRONIC LEFT SHOULDER PAIN: ICD-10-CM

## 2023-06-28 DIAGNOSIS — M19.012 ARTHRITIS OF LEFT ACROMIOCLAVICULAR JOINT: ICD-10-CM

## 2023-06-28 DIAGNOSIS — M75.02 ADHESIVE CAPSULITIS OF LEFT SHOULDER: Primary | ICD-10-CM

## 2023-06-28 RX ORDER — ROPIVACAINE HYDROCHLORIDE 5 MG/ML
18 INJECTION, SOLUTION EPIDURAL; INFILTRATION; PERINEURAL ONCE
Status: CANCELLED | OUTPATIENT
Start: 2023-06-28 | End: 2023-06-28

## 2023-06-28 RX ORDER — ROPIVACAINE HYDROCHLORIDE 5 MG/ML
4 INJECTION, SOLUTION EPIDURAL; INFILTRATION; PERINEURAL ONCE
Status: COMPLETED | OUTPATIENT
Start: 2023-06-28 | End: 2023-06-28

## 2023-06-28 RX ORDER — METHYLPREDNISOLONE ACETATE 40 MG/ML
80 INJECTION, SUSPENSION INTRA-ARTICULAR; INTRALESIONAL; INTRAMUSCULAR; SOFT TISSUE ONCE
Status: CANCELLED | OUTPATIENT
Start: 2023-06-28 | End: 2023-06-28

## 2023-06-28 RX ORDER — METHYLPREDNISOLONE ACETATE 40 MG/ML
80 INJECTION, SUSPENSION INTRA-ARTICULAR; INTRALESIONAL; INTRAMUSCULAR; SOFT TISSUE ONCE
Status: COMPLETED | OUTPATIENT
Start: 2023-06-28 | End: 2023-06-28

## 2023-06-28 RX ADMIN — ROPIVACAINE HYDROCHLORIDE 4 ML: 5 INJECTION, SOLUTION EPIDURAL; INFILTRATION; PERINEURAL at 14:13

## 2023-06-28 RX ADMIN — METHYLPREDNISOLONE ACETATE 80 MG: 40 INJECTION, SUSPENSION INTRA-ARTICULAR; INTRALESIONAL; INTRAMUSCULAR; SOFT TISSUE at 14:12

## 2023-07-06 DIAGNOSIS — E78.00 HYPERCHOLESTEROLEMIA: Primary | ICD-10-CM

## 2023-07-06 DIAGNOSIS — E78.2 ELEVATED TRIGLYCERIDES WITH HIGH CHOLESTEROL: ICD-10-CM

## 2023-07-06 RX ORDER — FENOFIBRATE 145 MG/1
145 TABLET, COATED ORAL DAILY
Qty: 30 TABLET | Refills: 3 | Status: SHIPPED | OUTPATIENT
Start: 2023-07-06

## 2023-07-31 RX ORDER — FENOFIBRATE 145 MG/1
TABLET, COATED ORAL
Qty: 30 TABLET | Refills: 3 | OUTPATIENT
Start: 2023-07-31

## 2023-07-31 NOTE — TELEPHONE ENCOUNTER
LV 3/13/23 WITH CC FOR ANXIETY NV NONE  fenofibrate (TRICOR) 145 MG tablet 30 tablet 3 7/6/2023     Sig - Route:  Take 1 tablet by mouth daily - Oral    Sent to pharmacy as: Fenofibrate 145 MG Oral Tablet (Tricor)    E-Prescribing Status: Receipt confirmed by pharmacy (7/6/2023  4:29 PM EDT)    Tiffanie Cevallos

## 2023-08-25 DIAGNOSIS — G40.209 PARTIAL SYMPTOMATIC EPILEPSY WITH COMPLEX PARTIAL SEIZURES, NOT INTRACTABLE, WITHOUT STATUS EPILEPTICUS (HCC): Chronic | ICD-10-CM

## 2023-08-25 NOTE — TELEPHONE ENCOUNTER
LV 3/13/23 WITH CC FOR ANXIETY NV NONE   Return in about 3 months (around 6/13/2023) for anxiety - Evist.  MY CHART MESSAGE SENT FOR PT TO MAKE A ROUTINE APPT FOR CHRONIC ISSUES

## 2023-08-28 DIAGNOSIS — G40.209 PARTIAL SYMPTOMATIC EPILEPSY WITH COMPLEX PARTIAL SEIZURES, NOT INTRACTABLE, WITHOUT STATUS EPILEPTICUS (HCC): Chronic | ICD-10-CM

## 2023-08-28 RX ORDER — GABAPENTIN 400 MG/1
CAPSULE ORAL
Qty: 120 CAPSULE | Refills: 1 | OUTPATIENT
Start: 2023-08-28

## 2023-08-28 NOTE — TELEPHONE ENCOUNTER
----- Message from Torsten Booker sent at 8/28/2023  8:33 AM EDT -----  Subject: Refill Request    QUESTIONS  Name of Medication? gabapentin (NEURONTIN) 400 MG capsule  Patient-reported dosage and instructions? Once at night  How many days do you have left? 0  Preferred Pharmacy? [id number]  Pharmacy phone number (if available)? [phone]  ---------------------------------------------------------------------------  --------------  Shantelle Contreras INFO  What is the best way for the office to contact you? OK to leave message on   voicemail  Preferred Call Back Phone Number? [phone]  ---------------------------------------------------------------------------  --------------  SCRIPT ANSWERS  Relationship to Patient? Parent  Representative Name? jamari Arrieta  Patient is under 25 and the Parent has custody? Yes  Additional information verified (besides Name and Date of Birth)?  Phone   Number

## 2023-08-28 NOTE — TELEPHONE ENCOUNTER
She would like a call back today concerning this medication, please call Kuldeep Donohue - phone [phone].

## 2023-08-28 NOTE — TELEPHONE ENCOUNTER
Patient is totally out of this medication, she does not want  him to go thru another night with this medication. Please give her a call back.      [facility] [place] [facility] - phone no. [phone]

## 2023-08-29 RX ORDER — GABAPENTIN 400 MG/1
CAPSULE ORAL
Qty: 120 CAPSULE | Refills: 1 | Status: SHIPPED | OUTPATIENT
Start: 2023-08-29 | End: 2023-10-27

## 2023-08-29 RX ORDER — GABAPENTIN 400 MG/1
CAPSULE ORAL
Qty: 120 CAPSULE | Refills: 1 | OUTPATIENT
Start: 2023-08-29

## 2023-08-29 NOTE — TELEPHONE ENCOUNTER
Patient called 3 times yesterday and we still have not returned her call. Her  was up all night long. She needs us to give her a call back. She is very upset.

## 2023-09-13 DIAGNOSIS — F40.10 SOCIAL ANXIETY DISORDER: Chronic | ICD-10-CM

## 2023-09-13 DIAGNOSIS — R79.83 HOMOCYSTEINEMIA: ICD-10-CM

## 2023-09-13 DIAGNOSIS — G40.209 PARTIAL SYMPTOMATIC EPILEPSY WITH COMPLEX PARTIAL SEIZURES, NOT INTRACTABLE, WITHOUT STATUS EPILEPTICUS (HCC): Chronic | ICD-10-CM

## 2023-09-13 RX ORDER — UBIDECARENONE 200 MG
800 CAPSULE ORAL DAILY
Qty: 180 TABLET | Refills: 0 | Status: SHIPPED | OUTPATIENT
Start: 2023-09-13 | End: 2024-09-07

## 2023-09-13 RX ORDER — CLONAZEPAM 1 MG/1
TABLET ORAL
Qty: 60 TABLET | Refills: 2 | Status: CANCELLED | OUTPATIENT
Start: 2023-09-13 | End: 2023-10-13

## 2023-09-13 RX ORDER — FENOFIBRATE 145 MG/1
145 TABLET, COATED ORAL DAILY
Qty: 90 TABLET | Refills: 1 | OUTPATIENT
Start: 2023-09-13

## 2023-09-13 NOTE — TELEPHONE ENCOUNTER
LV 6/14/23 WITH CC FOR ANXIETY DEPRESSION NV 10/10/23   Disp Refills Start End    fenofibrate (TRICOR) 145 MG tablet 30 tablet 3 7/6/2023     Sig - Route:  Take 1 tablet by mouth daily - Oral    Sent to pharmacy as: Fenofibrate 145 MG Oral Tablet (Tricor)    E-Prescribing Status: Receipt confirmed by pharmacy (7/6/2023  9:24 PM EDT)    Altamese Noon

## 2023-09-14 ENCOUNTER — E-VISIT (OUTPATIENT)
Dept: FAMILY MEDICINE CLINIC | Age: 43
End: 2023-09-14
Payer: COMMERCIAL

## 2023-09-14 DIAGNOSIS — G40.209 PARTIAL SYMPTOMATIC EPILEPSY WITH COMPLEX PARTIAL SEIZURES, NOT INTRACTABLE, WITHOUT STATUS EPILEPTICUS (HCC): Chronic | ICD-10-CM

## 2023-09-14 DIAGNOSIS — F40.10 SOCIAL ANXIETY DISORDER: Chronic | ICD-10-CM

## 2023-09-14 PROCEDURE — 99422 OL DIG E/M SVC 11-20 MIN: CPT | Performed by: NURSE PRACTITIONER

## 2023-09-14 RX ORDER — CLONAZEPAM 1 MG/1
TABLET ORAL
Qty: 60 TABLET | Refills: 2 | Status: SHIPPED | OUTPATIENT
Start: 2023-09-14 | End: 2023-10-14

## 2023-09-14 ASSESSMENT — PATIENT HEALTH QUESTIONNAIRE - PHQ9
1. LITTLE INTEREST OR PLEASURE IN DOING THINGS: 0
5. POOR APPETITE OR OVEREATING: 0
7. TROUBLE CONCENTRATING ON THINGS, SUCH AS READING THE NEWSPAPER OR WATCHING TELEVISION: NOT AT ALL
SUM OF ALL RESPONSES TO PHQ QUESTIONS 1-9: 0
SUM OF ALL RESPONSES TO PHQ9 QUESTIONS 1 & 2: 0
4. FEELING TIRED OR HAVING LITTLE ENERGY: 0
9. THOUGHTS THAT YOU WOULD BE BETTER OFF DEAD, OR OF HURTING YOURSELF: NOT AT ALL
3. TROUBLE FALLING OR STAYING ASLEEP: NOT AT ALL
SUM OF ALL RESPONSES TO PHQ QUESTIONS 1-9: 0
2. FEELING DOWN, DEPRESSED OR HOPELESS: NOT AT ALL
SUM OF ALL RESPONSES TO PHQ QUESTIONS 1-9: 0
4. FEELING TIRED OR HAVING LITTLE ENERGY: NOT AT ALL
6. FEELING BAD ABOUT YOURSELF - OR THAT YOU ARE A FAILURE OR HAVE LET YOURSELF OR YOUR FAMILY DOWN: NOT AT ALL
SUM OF ALL RESPONSES TO PHQ QUESTIONS 1-9: 0
1. LITTLE INTEREST OR PLEASURE IN DOING THINGS: NOT AT ALL
8. MOVING OR SPEAKING SO SLOWLY THAT OTHER PEOPLE COULD HAVE NOTICED. OR THE OPPOSITE, BEING SO FIGETY OR RESTLESS THAT YOU HAVE BEEN MOVING AROUND A LOT MORE THAN USUAL: 0
10. IF YOU CHECKED OFF ANY PROBLEMS, HOW DIFFICULT HAVE THESE PROBLEMS MADE IT FOR YOU TO DO YOUR WORK, TAKE CARE OF THINGS AT HOME, OR GET ALONG WITH OTHER PEOPLE: NOT DIFFICULT AT ALL
8. MOVING OR SPEAKING SO SLOWLY THAT OTHER PEOPLE COULD HAVE NOTICED. OR THE OPPOSITE - BEING SO FIDGETY OR RESTLESS THAT YOU HAVE BEEN MOVING AROUND A LOT MORE THAN USUAL: NOT AT ALL
9. THOUGHTS THAT YOU WOULD BE BETTER OFF DEAD, OR OF HURTING YOURSELF: 0
10. IF YOU CHECKED OFF ANY PROBLEMS, HOW DIFFICULT HAVE THESE PROBLEMS MADE IT FOR YOU TO DO YOUR WORK, TAKE CARE OF THINGS AT HOME, OR GET ALONG WITH OTHER PEOPLE: 0
6. FEELING BAD ABOUT YOURSELF - OR THAT YOU ARE A FAILURE OR HAVE LET YOURSELF OR YOUR FAMILY DOWN: 0
2. FEELING DOWN, DEPRESSED OR HOPELESS: 0
SUM OF ALL RESPONSES TO PHQ QUESTIONS 1-9: 0
7. TROUBLE CONCENTRATING ON THINGS, SUCH AS READING THE NEWSPAPER OR WATCHING TELEVISION: 0
5. POOR APPETITE OR OVEREATING: NOT AT ALL
3. TROUBLE FALLING OR STAYING ASLEEP: 0

## 2023-09-14 ASSESSMENT — ANXIETY QUESTIONNAIRES
3. WORRYING TOO MUCH ABOUT DIFFERENT THINGS: NOT AT ALL
3. WORRYING TOO MUCH ABOUT DIFFERENT THINGS: 0
2. NOT BEING ABLE TO STOP OR CONTROL WORRYING: NOT AT ALL
5. BEING SO RESTLESS THAT IT IS HARD TO SIT STILL: 0
IF YOU CHECKED OFF ANY PROBLEMS ON THIS QUESTIONNAIRE, HOW DIFFICULT HAVE THESE PROBLEMS MADE IT FOR YOU TO DO YOUR WORK, TAKE CARE OF THINGS AT HOME, OR GET ALONG WITH OTHER PEOPLE: NOT DIFFICULT AT ALL
5. BEING SO RESTLESS THAT IT IS HARD TO SIT STILL: NOT AT ALL
1. FEELING NERVOUS, ANXIOUS, OR ON EDGE: NOT AT ALL
6. BECOMING EASILY ANNOYED OR IRRITABLE: NOT AT ALL
7. FEELING AFRAID AS IF SOMETHING AWFUL MIGHT HAPPEN: NOT AT ALL
2. NOT BEING ABLE TO STOP OR CONTROL WORRYING: 0
1. FEELING NERVOUS, ANXIOUS, OR ON EDGE: 0
4. TROUBLE RELAXING: NOT AT ALL
GAD7 TOTAL SCORE: 0
GAD7 TOTAL SCORE: 0
4. TROUBLE RELAXING: 0
IF YOU CHECKED OFF ANY PROBLEMS ON THIS QUESTIONNAIRE, HOW DIFFICULT HAVE THESE PROBLEMS MADE IT FOR YOU TO DO YOUR WORK, TAKE CARE OF THINGS AT HOME, OR GET ALONG WITH OTHER PEOPLE: NOT DIFFICULT AT ALL
7. FEELING AFRAID AS IF SOMETHING AWFUL MIGHT HAPPEN: 0
6. BECOMING EASILY ANNOYED OR IRRITABLE: 0

## 2023-09-14 ASSESSMENT — PATIENT HEALTH QUESTIONNAIRE - GENERAL
HAVE YOU BEEN EXPERIENCING NEW OR WORSENING HEADACHES: N
HAVE YOU BEEN EXPERIENCING VERY LOW OR VERY HIGH MOODS: N
HAVE YOU BEEN EXPERIENCING AN UNUSUALLY DRY MOUTH: N
HAVE YOU BEEN EXPERIENCING ABDOMINAL DISCOMFORT, NAUSEA OR CHANGES IN YOUR BOWEL PATTERN: N
HAVE YOU BEEN EXPERIENCING NEW OR WORSENING PROBLEMS WITH YOUR SEXUAL FUNCTION: N
HAVE YOU BEEN EXPERIENCING VIVID DREAMS OR NIGHTMARES THAT INTERFERE WITH YOUR SLEEP: N
SINCE YOUR LAST VISIT, HAVE YOU EXPERIENCED EPISODES OF FAINTING OR NEAR FAINTING: N
DO YOU HAVE ANY NEW RASHES OR UNEXPLAINED ITCHING OR SWELLING: N
HAVE YOU BEEN EXPERIENCING LIGHT HEADEDNESS, WOOZINESS, OR DIZZINESS, ESPECIALLY UPON STANDING FROM SITTING OR LYING POSITIONS: N
HAVE YOU BEEN EXPERIENCING RACING THOUGHTS OR IMPULSIVE BEHAVIOR: N
HAVE YOU BEEN EXPERIENCING UNEXPLAINED HIGH FEVERS OR EXCESSIVE SWEATING: N
HAVE YOU BEEN EXPERIENCING NEW OR WORSENING DIFFICULTY WITH URINATION OR CHANGE IN URINARY PATTERN: N
HAVE YOU BEEN EXPERIENCING NEW OR WORSENING VISUAL CHANGES: N
HAVE YOU BEEN EXPERIENCING INVOLUNTARY WEIGHT GAIN OR LOSS: N
HAVE YOU BEEN EXPERIENCING HALLUCINATIONS OR CONFUSION: N
HAVE YOU BEEN EXPERIENCING NEW OR WORSENING MUSCLE TWITCHING, SHAKINESS, OR OTHER UNUSUAL CHANGES IN YOUR MUSCLE MOVEMENT: N
DO YOU HAVE A FASTER HEART RATE THAN USUAL, DOES YOUR HEART RATE FEEL IRREGULAR OR DO YOU FEEL LIKE YOUR HEART IS POUNDING AT REST: N

## 2023-09-14 NOTE — PROGRESS NOTES
11-20 minutes were spent on the digital evaluation and management of this patient.  Oaars reviewed- consistent

## 2023-09-25 DIAGNOSIS — F31.78 BIPOLAR 1 DISORDER, MIXED, FULL REMISSION (HCC): ICD-10-CM

## 2023-09-26 DIAGNOSIS — R79.83 HOMOCYSTEINEMIA: ICD-10-CM

## 2023-09-26 DIAGNOSIS — F31.78 BIPOLAR 1 DISORDER, MIXED, FULL REMISSION (HCC): ICD-10-CM

## 2023-09-26 RX ORDER — ZIPRASIDONE HYDROCHLORIDE 60 MG/1
60 CAPSULE ORAL 2 TIMES DAILY WITH MEALS
Qty: 180 CAPSULE | Refills: 0 | Status: SHIPPED | OUTPATIENT
Start: 2023-09-26 | End: 2023-12-25

## 2023-09-26 RX ORDER — LANOLIN ALCOHOL/MO/W.PET/CERES
800 CREAM (GRAM) TOPICAL DAILY
Qty: 180 TABLET | Refills: 0 | OUTPATIENT
Start: 2023-09-26

## 2023-09-26 RX ORDER — LAMOTRIGINE 200 MG/1
TABLET ORAL
Qty: 60 TABLET | Refills: 2 | Status: SHIPPED | OUTPATIENT
Start: 2023-09-26

## 2023-10-05 ENCOUNTER — TELEPHONE (OUTPATIENT)
Dept: FAMILY MEDICINE CLINIC | Age: 43
End: 2023-10-05

## 2023-10-05 DIAGNOSIS — E78.00 HYPERCHOLESTEROLEMIA: ICD-10-CM

## 2023-10-05 RX ORDER — ATORVASTATIN CALCIUM 10 MG/1
10 TABLET, FILM COATED ORAL DAILY
Qty: 90 TABLET | Refills: 0 | Status: SHIPPED | OUTPATIENT
Start: 2023-10-05 | End: 2024-01-03

## 2023-10-06 ENCOUNTER — OFFICE VISIT (OUTPATIENT)
Dept: ORTHOPEDIC SURGERY | Age: 43
End: 2023-10-06

## 2023-10-06 VITALS — HEIGHT: 68 IN | WEIGHT: 225 LBS | BODY MASS INDEX: 34.1 KG/M2

## 2023-10-06 DIAGNOSIS — M75.22 BICIPITAL TENDINITIS OF LEFT SHOULDER: ICD-10-CM

## 2023-10-06 DIAGNOSIS — M25.512 LEFT SHOULDER PAIN, UNSPECIFIED CHRONICITY: ICD-10-CM

## 2023-10-06 DIAGNOSIS — M75.52 SUBACROMIAL BURSITIS OF LEFT SHOULDER JOINT: ICD-10-CM

## 2023-10-06 DIAGNOSIS — M25.512 TRIGGER POINT OF LEFT SHOULDER REGION: ICD-10-CM

## 2023-10-06 DIAGNOSIS — M75.02 ADHESIVE CAPSULITIS OF LEFT SHOULDER: Primary | ICD-10-CM

## 2023-10-06 DIAGNOSIS — M19.012 ARTHRITIS OF LEFT ACROMIOCLAVICULAR JOINT: ICD-10-CM

## 2023-10-06 NOTE — TELEPHONE ENCOUNTER
Requested Prescriptions     Pending Prescriptions Disp Refills    Diclofenac Sodium POWD 100 g 0     Sig: Apply 1-2 g topically 3 times daily as needed (As needed for pain) Ketamine 5% Gabapentin 5% Cyclobenzaprine 2% Diclofenac 3% Lidocaine 3%     Last OV: 6/28/23 although saw Dr Terry Mott today  Last filled: 3/24/23      Assessment :  Mr. Tracie Meneses is a pleasant, 38 y.o. patient who is having recurrent superior shoulder pain due to chronic AC joint OA. He is a candidate for a repeat AC joint injection. He is still improving several months post left cubital tunnel release surgery. Impression:       Encounter Diagnoses   Name Primary? Adhesive capsulitis of left shoulder Yes    Arthritis of left acromioclavicular joint      Chronic left shoulder pain           Office Procedures:        Orders Placed This Encounter   Procedures    XR SHOULDER LEFT (MIN 2 VIEWS)       Standing Status:   Future       Number of Occurrences:   1       Standing Expiration Date:   6/28/2024    US ARTHR/ASP/INJ MAJOR JNT/BURSA LEFT       Standing Status:   Future       Number of Occurrences:   1       Standing Expiration Date:   6/28/2024         Treatment Plan: We proceeded with injection today. Procedure: The indications and risks of steroid injection as well as treatment alternatives were discussed with the patient who consented to the procedure. Under sterile conditions and after informed consent was obtained, using  superior posterior approach   the patient was given an injection into the left shoulder  AC joint  . 1mL 40 mg of Depo-Medrol  and 2 mL of 0.5% ropivacaine (Naropin) were placed in the shoulder after it was prepped with chlorhexidine. This resulted in good relief of symptoms. There were no complications. The patient was advised to ice the shoulder this evening and to avoid vigorous activities for the next 2 days.   They were advised to call us if there was any erythema, enduration, swelling or

## 2023-10-06 NOTE — PROGRESS NOTES
with repetitive corticosteroid injections and potential long-term side effects. He has been a longstanding patient of Dr. Armand Russo and has also benefited from the formulary medicine compounding.  -He is going to follow-up with Dr. Armand Russo for consideration of continued formulary compounding. Consideration for physical therapy.  -He does have generalized trigger points and I reviewed with him how to work on trigger point management  -Continue activity modification as required and given longstanding patient relationship with Dr Armand Russo, should the patient still have pain consideration for continued care/surgical intervention by Dr Armand Russo  -All questions answered to the patient's satisfaction and the patient expressed understanding and agreement with the above listed treatment plan  -Follow up in Dr Armand Russo clinic for continuity of care and continued medical management  -Thank you for the clinical consultation and allowing me to participate in the patient's care. Electronically signed by Erica Fernando MD on 10/6/23 at 9:06 AM ERICK Fernando MD       Orthopaedic Surgery-Sports Medicine        Disclaimer: This note was dictated with voice recognition software. Though review and correction are routinely performed, please contact the office/medical records for any errors requiring correction.

## 2023-10-11 DIAGNOSIS — G40.209 PARTIAL SYMPTOMATIC EPILEPSY WITH COMPLEX PARTIAL SEIZURES, NOT INTRACTABLE, WITHOUT STATUS EPILEPTICUS (HCC): Chronic | ICD-10-CM

## 2023-10-11 DIAGNOSIS — R79.83 HOMOCYSTEINEMIA: ICD-10-CM

## 2023-10-11 DIAGNOSIS — F31.78 BIPOLAR 1 DISORDER, MIXED, FULL REMISSION (HCC): ICD-10-CM

## 2023-10-11 RX ORDER — FENOFIBRATE 145 MG/1
145 TABLET, COATED ORAL DAILY
Qty: 90 TABLET | Refills: 1 | Status: SHIPPED | OUTPATIENT
Start: 2023-10-11

## 2023-10-11 RX ORDER — GABAPENTIN 400 MG/1
CAPSULE ORAL
Qty: 120 CAPSULE | Refills: 1 | Status: SHIPPED | OUTPATIENT
Start: 2023-10-11 | End: 2023-11-11

## 2023-10-11 RX ORDER — ZIPRASIDONE HYDROCHLORIDE 40 MG/1
CAPSULE ORAL
Qty: 90 CAPSULE | Refills: 1 | Status: SHIPPED | OUTPATIENT
Start: 2023-10-11

## 2023-10-11 RX ORDER — LANOLIN ALCOHOL/MO/W.PET/CERES
800 CREAM (GRAM) TOPICAL DAILY
Qty: 180 TABLET | Refills: 0 | Status: SHIPPED | OUTPATIENT
Start: 2023-10-11

## 2023-10-22 PROBLEM — E08.69: Status: RESOLVED | Noted: 2023-03-13 | Resolved: 2023-10-22

## 2023-11-22 ENCOUNTER — TELEPHONE (OUTPATIENT)
Dept: FAMILY MEDICINE CLINIC | Age: 43
End: 2023-11-22

## 2023-11-22 DIAGNOSIS — F31.78 BIPOLAR 1 DISORDER, MIXED, FULL REMISSION (HCC): ICD-10-CM

## 2023-11-22 RX ORDER — LAMOTRIGINE 200 MG/1
TABLET ORAL
Qty: 180 TABLET | Refills: 0 | Status: SHIPPED | OUTPATIENT
Start: 2023-11-22

## 2023-12-07 ENCOUNTER — PATIENT MESSAGE (OUTPATIENT)
Dept: FAMILY MEDICINE CLINIC | Age: 43
End: 2023-12-07

## 2023-12-07 DIAGNOSIS — F31.78 BIPOLAR 1 DISORDER, MIXED, FULL REMISSION (HCC): ICD-10-CM

## 2023-12-07 DIAGNOSIS — F40.10 SOCIAL ANXIETY DISORDER: Chronic | ICD-10-CM

## 2023-12-07 DIAGNOSIS — G40.209 PARTIAL SYMPTOMATIC EPILEPSY WITH COMPLEX PARTIAL SEIZURES, NOT INTRACTABLE, WITHOUT STATUS EPILEPTICUS (HCC): Chronic | ICD-10-CM

## 2023-12-07 RX ORDER — LAMOTRIGINE 200 MG/1
TABLET ORAL
Qty: 180 TABLET | Refills: 1 | Status: SHIPPED | OUTPATIENT
Start: 2023-12-07

## 2023-12-07 NOTE — TELEPHONE ENCOUNTER
From: Myriam Javier  To: Lisa Abrams  Sent: 12/7/2023 10:58 AM EST  Subject: Kaley Carrasco Ins. stops 12/31/2023    I would like to get refills on clonazepam 1mg, Ziprasidone HCL 60mg, Lamotrigine 200mg, vit. d-3 scripts 90days and how ever many refills you can do. I also need 60 day supply of clonazepam 2mg one time. If any question can call be @ [phone] or just send me a message back and tell me what we need to do.  Thanks Meenakshi

## 2023-12-10 DIAGNOSIS — G40.209 PARTIAL SYMPTOMATIC EPILEPSY WITH COMPLEX PARTIAL SEIZURES, NOT INTRACTABLE, WITHOUT STATUS EPILEPTICUS (HCC): Chronic | ICD-10-CM

## 2023-12-10 DIAGNOSIS — R79.83 HOMOCYSTEINEMIA: ICD-10-CM

## 2023-12-11 RX ORDER — GABAPENTIN 400 MG/1
CAPSULE ORAL
Qty: 120 CAPSULE | Refills: 5 | Status: SHIPPED | OUTPATIENT
Start: 2023-12-11 | End: 2024-01-10

## 2023-12-11 RX ORDER — LANOLIN ALCOHOL/MO/W.PET/CERES
800 CREAM (GRAM) TOPICAL DAILY
Qty: 180 TABLET | Refills: 0 | Status: SHIPPED | OUTPATIENT
Start: 2023-12-11

## 2023-12-15 RX ORDER — CLONAZEPAM 1 MG/1
1 TABLET ORAL 2 TIMES DAILY PRN
Qty: 60 TABLET | Refills: 0 | Status: SHIPPED | OUTPATIENT
Start: 2023-12-15 | End: 2024-01-14

## 2023-12-15 RX ORDER — CLONAZEPAM 2 MG/1
2 TABLET ORAL 2 TIMES DAILY PRN
Qty: 30 TABLET | Refills: 0 | Status: SHIPPED | OUTPATIENT
Start: 2023-12-15 | End: 2024-01-14

## 2023-12-15 NOTE — TELEPHONE ENCOUNTER
Sent for one month but following to Linda to see what she wants them to do since the evisit apparently is not working.

## 2023-12-29 DIAGNOSIS — Z72.0 TOBACCO ABUSE: ICD-10-CM

## 2023-12-29 RX ORDER — VARENICLINE TARTRATE 1 MG/1
TABLET, FILM COATED ORAL
Qty: 180 TABLET | Refills: 0 | Status: SHIPPED | OUTPATIENT
Start: 2023-12-29

## 2024-01-31 ENCOUNTER — OFFICE VISIT (OUTPATIENT)
Dept: FAMILY MEDICINE CLINIC | Age: 44
End: 2024-01-31
Payer: COMMERCIAL

## 2024-01-31 VITALS
HEART RATE: 100 BPM | SYSTOLIC BLOOD PRESSURE: 112 MMHG | HEIGHT: 68 IN | BODY MASS INDEX: 36.37 KG/M2 | DIASTOLIC BLOOD PRESSURE: 78 MMHG | WEIGHT: 240 LBS | OXYGEN SATURATION: 98 %

## 2024-01-31 DIAGNOSIS — K21.9 GASTROESOPHAGEAL REFLUX DISEASE, UNSPECIFIED WHETHER ESOPHAGITIS PRESENT: Chronic | ICD-10-CM

## 2024-01-31 DIAGNOSIS — T50.905A EXTRAPYRAMIDAL MOVEMENT DISORDER, DRUG-INDUCED: ICD-10-CM

## 2024-01-31 DIAGNOSIS — F31.78 BIPOLAR 1 DISORDER, MIXED, FULL REMISSION (HCC): Primary | ICD-10-CM

## 2024-01-31 DIAGNOSIS — F51.04 PSYCHOPHYSIOLOGICAL INSOMNIA: ICD-10-CM

## 2024-01-31 DIAGNOSIS — F40.10 SOCIAL ANXIETY DISORDER: Chronic | ICD-10-CM

## 2024-01-31 DIAGNOSIS — G25.89 EXTRAPYRAMIDAL MOVEMENT DISORDER, DRUG-INDUCED: ICD-10-CM

## 2024-01-31 DIAGNOSIS — Z23 NEED FOR INFLUENZA VACCINATION: ICD-10-CM

## 2024-01-31 DIAGNOSIS — G40.209 PARTIAL SYMPTOMATIC EPILEPSY WITH COMPLEX PARTIAL SEIZURES, NOT INTRACTABLE, WITHOUT STATUS EPILEPTICUS (HCC): ICD-10-CM

## 2024-01-31 DIAGNOSIS — E55.9 VITAMIN D DEFICIENCY: Chronic | ICD-10-CM

## 2024-01-31 DIAGNOSIS — I10 ESSENTIAL HYPERTENSION: ICD-10-CM

## 2024-01-31 DIAGNOSIS — E78.00 HYPERCHOLESTEROLEMIA: ICD-10-CM

## 2024-01-31 PROCEDURE — 99214 OFFICE O/P EST MOD 30 MIN: CPT | Performed by: NURSE PRACTITIONER

## 2024-01-31 PROCEDURE — 3078F DIAST BP <80 MM HG: CPT | Performed by: NURSE PRACTITIONER

## 2024-01-31 PROCEDURE — 3074F SYST BP LT 130 MM HG: CPT | Performed by: NURSE PRACTITIONER

## 2024-01-31 RX ORDER — GABAPENTIN 400 MG/1
CAPSULE ORAL
Qty: 120 CAPSULE | Refills: 5 | Status: SHIPPED | OUTPATIENT
Start: 2024-01-31 | End: 2024-03-22

## 2024-01-31 RX ORDER — TRIHEXYPHENIDYL HYDROCHLORIDE 2 MG/1
TABLET ORAL
Qty: 180 TABLET | Refills: 2 | Status: SHIPPED | OUTPATIENT
Start: 2024-01-31

## 2024-01-31 RX ORDER — NADOLOL 20 MG/1
TABLET ORAL
Qty: 45 TABLET | Refills: 2 | Status: SHIPPED | OUTPATIENT
Start: 2024-01-31

## 2024-01-31 RX ORDER — CLONAZEPAM 2 MG/1
2 TABLET ORAL 2 TIMES DAILY PRN
Qty: 30 TABLET | Refills: 0 | Status: SHIPPED | OUTPATIENT
Start: 2024-01-31 | End: 2024-03-01

## 2024-01-31 RX ORDER — TRAZODONE HYDROCHLORIDE 50 MG/1
TABLET ORAL
Qty: 90 TABLET | Refills: 2 | Status: SHIPPED | OUTPATIENT
Start: 2024-01-31

## 2024-01-31 RX ORDER — OMEPRAZOLE 40 MG/1
40 CAPSULE, DELAYED RELEASE ORAL
Qty: 90 CAPSULE | Refills: 1 | Status: SHIPPED | OUTPATIENT
Start: 2024-01-31 | End: 2024-04-30

## 2024-01-31 RX ORDER — ATORVASTATIN CALCIUM 10 MG/1
10 TABLET, FILM COATED ORAL DAILY
Qty: 90 TABLET | Refills: 1 | Status: SHIPPED | OUTPATIENT
Start: 2024-01-31 | End: 2024-04-30

## 2024-01-31 RX ORDER — CLONAZEPAM 1 MG/1
1 TABLET ORAL 2 TIMES DAILY PRN
Qty: 60 TABLET | Refills: 2 | Status: SHIPPED | OUTPATIENT
Start: 2024-01-31 | End: 2024-03-01

## 2024-01-31 RX ORDER — ZIPRASIDONE HYDROCHLORIDE 40 MG/1
CAPSULE ORAL
Qty: 90 CAPSULE | Refills: 1 | Status: SHIPPED | OUTPATIENT
Start: 2024-01-31

## 2024-01-31 RX ORDER — AMLODIPINE BESYLATE 2.5 MG/1
TABLET ORAL
Qty: 90 TABLET | Refills: 1 | Status: SHIPPED | OUTPATIENT
Start: 2024-01-31

## 2024-01-31 ASSESSMENT — PATIENT HEALTH QUESTIONNAIRE - PHQ9
7. TROUBLE CONCENTRATING ON THINGS, SUCH AS READING THE NEWSPAPER OR WATCHING TELEVISION: 0
SUM OF ALL RESPONSES TO PHQ9 QUESTIONS 1 & 2: 0
3. TROUBLE FALLING OR STAYING ASLEEP: 1
SUM OF ALL RESPONSES TO PHQ QUESTIONS 1-9: 2
4. FEELING TIRED OR HAVING LITTLE ENERGY: 1
10. IF YOU CHECKED OFF ANY PROBLEMS, HOW DIFFICULT HAVE THESE PROBLEMS MADE IT FOR YOU TO DO YOUR WORK, TAKE CARE OF THINGS AT HOME, OR GET ALONG WITH OTHER PEOPLE: 1
2. FEELING DOWN, DEPRESSED OR HOPELESS: 0
1. LITTLE INTEREST OR PLEASURE IN DOING THINGS: 0
8. MOVING OR SPEAKING SO SLOWLY THAT OTHER PEOPLE COULD HAVE NOTICED. OR THE OPPOSITE, BEING SO FIGETY OR RESTLESS THAT YOU HAVE BEEN MOVING AROUND A LOT MORE THAN USUAL: 0
9. THOUGHTS THAT YOU WOULD BE BETTER OFF DEAD, OR OF HURTING YOURSELF: 0
SUM OF ALL RESPONSES TO PHQ QUESTIONS 1-9: 2
6. FEELING BAD ABOUT YOURSELF - OR THAT YOU ARE A FAILURE OR HAVE LET YOURSELF OR YOUR FAMILY DOWN: 0
5. POOR APPETITE OR OVEREATING: 0

## 2024-02-03 NOTE — PROGRESS NOTES
hours but this is hard the heartburn is worse when he lies down would like increasing his medication  Review of Systems   Cardiovascular: Negative.    Musculoskeletal:  Positive for arthralgias.   Psychiatric/Behavioral:  Positive for sleep disturbance. The patient is nervous/anxious.        Physical Exam  Vitals reviewed.   Constitutional:       General: He is awake. He is not in acute distress.     Appearance: Normal appearance. He is well-developed and well-groomed. He is obese. He is not ill-appearing, toxic-appearing or diaphoretic.   Cardiovascular:      Rate and Rhythm: Normal rate and regular rhythm.      Heart sounds: Normal heart sounds, S1 normal and S2 normal.   Pulmonary:      Effort: Pulmonary effort is normal.      Breath sounds: Normal breath sounds and air entry.   Musculoskeletal:      Left shoulder: Decreased range of motion.   Neurological:      Mental Status: He is alert and oriented to person, place, and time.   Psychiatric:         Attention and Perception: Attention and perception normal.         Mood and Affect: Mood and affect normal.         Speech: Speech normal.         Behavior: Behavior normal. Behavior is cooperative.         Thought Content: Thought content normal.         Cognition and Memory: Cognition and memory normal.         Judgment: Judgment normal.         ASSESSMENT/PLAN:  1. Bipolar 1 disorder, mixed, full remission (HCC)  Stable on current medications  -     ziprasidone (GEODON) 40 MG capsule; TAKE 1 CAPSULE BY MOUTH DAILY TAKE WITH 60 MG AT DINNER, Disp-90 capsule, R-1Normal       Lamictal as prescribed no refills needed at this time  2. Social anxiety disorder  Controlled substances monitoring: possible medication side effects, risk of tolerance and/or dependence, and alternative treatments discussed, no signs of potential drug abuse or diversion identified, and OARRS report reviewed today- activity consistent with treatment plan.   clonazePAM (KLONOPIN) 1 MG tablet;

## 2024-03-21 DIAGNOSIS — F31.78 BIPOLAR 1 DISORDER, MIXED, FULL REMISSION (HCC): ICD-10-CM

## 2024-03-22 RX ORDER — ZIPRASIDONE HYDROCHLORIDE 60 MG/1
CAPSULE ORAL
Qty: 180 CAPSULE | Refills: 0 | Status: SHIPPED | OUTPATIENT
Start: 2024-03-22

## 2024-03-27 ENCOUNTER — HOSPITAL ENCOUNTER (OUTPATIENT)
Age: 44
Discharge: HOME | End: 2024-03-27
Payer: COMMERCIAL

## 2024-03-27 DIAGNOSIS — E55.9 VITAMIN D DEFICIENCY: Chronic | ICD-10-CM

## 2024-03-27 DIAGNOSIS — E78.00 HYPERCHOLESTEROLEMIA: ICD-10-CM

## 2024-03-27 LAB
25(OH)D3 SERPL-MCNC: 38.7 NG/ML
CHOLEST SERPL-MCNC: 190 MG/DL (ref 0–199)
HDLC SERPL-MCNC: 44 MG/DL (ref 40–60)
LDL CHOLESTEROL CALCULATED: 123 MG/DL
TRIGL SERPL-MCNC: 117 MG/DL (ref 0–150)
VLDLC SERPL CALC-MCNC: 23 MG/DL

## 2024-03-27 PROCEDURE — 80061 LIPID PANEL: CPT

## 2024-03-27 PROCEDURE — 36415 COLL VENOUS BLD VENIPUNCTURE: CPT

## 2024-03-27 PROCEDURE — 82306 VITAMIN D 25 HYDROXY: CPT

## 2024-04-01 DIAGNOSIS — F31.78 BIPOLAR 1 DISORDER, MIXED, FULL REMISSION (HCC): ICD-10-CM

## 2024-04-01 RX ORDER — FENOFIBRATE 145 MG/1
145 TABLET, COATED ORAL DAILY
Qty: 90 TABLET | Refills: 0 | Status: SHIPPED | OUTPATIENT
Start: 2024-04-01

## 2024-04-01 RX ORDER — LAMOTRIGINE 200 MG/1
TABLET ORAL
Qty: 180 TABLET | Refills: 0 | Status: SHIPPED | OUTPATIENT
Start: 2024-04-01

## 2024-04-24 ENCOUNTER — TELEPHONE (OUTPATIENT)
Dept: FAMILY MEDICINE CLINIC | Age: 44
End: 2024-04-24

## 2024-04-24 DIAGNOSIS — G25.89 EXTRAPYRAMIDAL MOVEMENT DISORDER, DRUG-INDUCED: ICD-10-CM

## 2024-04-24 DIAGNOSIS — T50.905A EXTRAPYRAMIDAL MOVEMENT DISORDER, DRUG-INDUCED: ICD-10-CM

## 2024-04-24 RX ORDER — TRIHEXYPHENIDYL HYDROCHLORIDE 2 MG/1
TABLET ORAL
Qty: 180 TABLET | Refills: 3 | Status: SHIPPED | OUTPATIENT
Start: 2024-04-24

## 2024-04-26 DIAGNOSIS — G40.209 PARTIAL SYMPTOMATIC EPILEPSY WITH COMPLEX PARTIAL SEIZURES, NOT INTRACTABLE, WITHOUT STATUS EPILEPTICUS (HCC): ICD-10-CM

## 2024-04-26 DIAGNOSIS — F40.10 SOCIAL ANXIETY DISORDER: Chronic | ICD-10-CM

## 2024-04-26 RX ORDER — CLONAZEPAM 1 MG/1
1 TABLET ORAL 2 TIMES DAILY PRN
Qty: 60 TABLET | Refills: 0 | Status: SHIPPED | OUTPATIENT
Start: 2024-05-01 | End: 2024-05-31

## 2024-05-08 ENCOUNTER — OFFICE VISIT (OUTPATIENT)
Dept: FAMILY MEDICINE CLINIC | Age: 44
End: 2024-05-08
Payer: COMMERCIAL

## 2024-05-08 VITALS
HEART RATE: 98 BPM | OXYGEN SATURATION: 99 % | HEIGHT: 68 IN | RESPIRATION RATE: 16 BRPM | SYSTOLIC BLOOD PRESSURE: 136 MMHG | WEIGHT: 245 LBS | DIASTOLIC BLOOD PRESSURE: 84 MMHG | BODY MASS INDEX: 37.13 KG/M2

## 2024-05-08 DIAGNOSIS — F40.10 SOCIAL ANXIETY DISORDER: Primary | Chronic | ICD-10-CM

## 2024-05-08 DIAGNOSIS — K21.9 GASTROESOPHAGEAL REFLUX DISEASE, UNSPECIFIED WHETHER ESOPHAGITIS PRESENT: Chronic | ICD-10-CM

## 2024-05-08 DIAGNOSIS — R20.2 PARESTHESIAS: Chronic | ICD-10-CM

## 2024-05-08 DIAGNOSIS — F31.78 BIPOLAR 1 DISORDER, MIXED, FULL REMISSION (HCC): ICD-10-CM

## 2024-05-08 DIAGNOSIS — G40.209 PARTIAL SYMPTOMATIC EPILEPSY WITH COMPLEX PARTIAL SEIZURES, NOT INTRACTABLE, WITHOUT STATUS EPILEPTICUS (HCC): ICD-10-CM

## 2024-05-08 DIAGNOSIS — E78.2 MIXED HYPERLIPIDEMIA: ICD-10-CM

## 2024-05-08 PROCEDURE — 80305 DRUG TEST PRSMV DIR OPT OBS: CPT | Performed by: NURSE PRACTITIONER

## 2024-05-08 PROCEDURE — 99214 OFFICE O/P EST MOD 30 MIN: CPT | Performed by: NURSE PRACTITIONER

## 2024-05-08 PROCEDURE — 3079F DIAST BP 80-89 MM HG: CPT | Performed by: NURSE PRACTITIONER

## 2024-05-08 PROCEDURE — 3075F SYST BP GE 130 - 139MM HG: CPT | Performed by: NURSE PRACTITIONER

## 2024-05-08 RX ORDER — CLONAZEPAM 2 MG/1
2 TABLET ORAL 2 TIMES DAILY PRN
Qty: 30 TABLET | Refills: 0 | Status: SHIPPED | OUTPATIENT
Start: 2024-05-08 | End: 2024-06-07

## 2024-05-08 RX ORDER — CLONAZEPAM 1 MG/1
1 TABLET ORAL 2 TIMES DAILY PRN
Qty: 60 TABLET | Refills: 2 | Status: SHIPPED | OUTPATIENT
Start: 2024-05-31 | End: 2024-06-30

## 2024-05-08 RX ORDER — LAMOTRIGINE 200 MG/1
TABLET ORAL
Qty: 180 TABLET | Refills: 1 | Status: SHIPPED | OUTPATIENT
Start: 2024-05-08

## 2024-05-08 RX ORDER — ATORVASTATIN CALCIUM 10 MG/1
10 TABLET, FILM COATED ORAL DAILY
Qty: 90 TABLET | Refills: 3 | Status: SHIPPED | OUTPATIENT
Start: 2024-05-08 | End: 2024-08-06

## 2024-05-08 RX ORDER — OMEPRAZOLE 40 MG/1
40 CAPSULE, DELAYED RELEASE ORAL
Qty: 90 CAPSULE | Refills: 3 | Status: SHIPPED | OUTPATIENT
Start: 2024-05-08 | End: 2024-08-06

## 2024-05-08 RX ORDER — FENOFIBRATE 145 MG/1
145 TABLET, COATED ORAL DAILY
Qty: 90 TABLET | Refills: 0 | Status: SHIPPED | OUTPATIENT
Start: 2024-05-08 | End: 2024-05-08

## 2024-05-08 RX ORDER — GABAPENTIN 400 MG/1
CAPSULE ORAL
Qty: 120 CAPSULE | Refills: 5 | Status: SHIPPED | OUTPATIENT
Start: 2024-05-08 | End: 2024-08-14

## 2024-05-08 RX ORDER — FENOFIBRATE 145 MG/1
145 TABLET, COATED ORAL DAILY
Qty: 90 TABLET | Refills: 3 | Status: SHIPPED | OUTPATIENT
Start: 2024-05-08 | End: 2024-08-06

## 2024-05-08 RX ORDER — ZIPRASIDONE HYDROCHLORIDE 60 MG/1
CAPSULE ORAL
Qty: 180 CAPSULE | Refills: 1 | Status: SHIPPED | OUTPATIENT
Start: 2024-05-08

## 2024-05-08 SDOH — ECONOMIC STABILITY: FOOD INSECURITY: WITHIN THE PAST 12 MONTHS, THE FOOD YOU BOUGHT JUST DIDN'T LAST AND YOU DIDN'T HAVE MONEY TO GET MORE.: NEVER TRUE

## 2024-05-08 SDOH — ECONOMIC STABILITY: FOOD INSECURITY: WITHIN THE PAST 12 MONTHS, YOU WORRIED THAT YOUR FOOD WOULD RUN OUT BEFORE YOU GOT MONEY TO BUY MORE.: NEVER TRUE

## 2024-05-08 SDOH — ECONOMIC STABILITY: INCOME INSECURITY: HOW HARD IS IT FOR YOU TO PAY FOR THE VERY BASICS LIKE FOOD, HOUSING, MEDICAL CARE, AND HEATING?: NOT HARD AT ALL

## 2024-05-08 NOTE — PROGRESS NOTES
Mickey Nolasco (:  1980) is a 43 y.o. 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  Insomnia well-controlled on trazodone he also has a medical marijuana card that helps him with his insomnia and anxiety  He needs the prilosec refilled pharmacist told him they sent us a letter because his insurance said he could not take it longer than one year  Review of Systems   Cardiovascular: Negative.    Gastrointestinal:         Gerd   Psychiatric/Behavioral: Negative.         Physical Exam  Vitals reviewed.   Constitutional:       General: He is awake. He is not in acute distress.     Appearance: Normal appearance. He is well-developed and well-groomed. He is obese. He is not ill-appearing, toxic-appearing or diaphoretic.   Cardiovascular:      Rate and Rhythm: Normal rate and regular rhythm.      Heart sounds: Normal heart sounds, S1 normal and S2 normal.   Pulmonary:      Effort: Pulmonary effort is normal.      Breath sounds: Normal breath sounds and air entry.   Neurological:      Mental Status: He is alert and oriented to

## 2024-05-08 NOTE — PATIENT INSTRUCTIONS

## 2024-05-20 ENCOUNTER — TELEPHONE (OUTPATIENT)
Dept: ADMINISTRATIVE | Age: 44
End: 2024-05-20

## 2024-05-20 NOTE — TELEPHONE ENCOUNTER
Submitted PA for Omeprazole 40MG dr capsules   Via [facility] Key: [id number]  STATUS: PENDING.    Follow up done daily; if no decision with in three days we will refax.  If another three days goes by with no decision will call the insurance for status.

## 2024-06-10 NOTE — PROGRESS NOTES
CALLED AND SPOKE TO PATIENT AND HE DID SAY THAT HE WAS STILL INTERESTED IN THE ALCOHOL TREATMENT PROGRAM. HE STATES THAT HE DOES NOT NEED ANY KIND OF \" LOCKDOWN\" OR DETOX PROGRAM, JUST SOMEWHERE TO HAVE MEETINGS.  SC 0.6

## 2024-06-21 DIAGNOSIS — I10 ESSENTIAL HYPERTENSION: ICD-10-CM

## 2024-06-24 RX ORDER — AMLODIPINE BESYLATE 2.5 MG/1
TABLET ORAL
Qty: 90 TABLET | Refills: 0 | Status: SHIPPED | OUTPATIENT
Start: 2024-06-24

## 2024-07-30 DIAGNOSIS — F31.78 BIPOLAR 1 DISORDER, MIXED, FULL REMISSION (HCC): ICD-10-CM

## 2024-07-31 RX ORDER — ZIPRASIDONE HYDROCHLORIDE 40 MG/1
CAPSULE ORAL
Qty: 90 CAPSULE | Refills: 0 | Status: SHIPPED | OUTPATIENT
Start: 2024-07-31

## 2024-08-07 ENCOUNTER — OFFICE VISIT (OUTPATIENT)
Dept: FAMILY MEDICINE CLINIC | Age: 44
End: 2024-08-07
Payer: COMMERCIAL

## 2024-08-07 VITALS
HEIGHT: 68 IN | BODY MASS INDEX: 38.34 KG/M2 | HEART RATE: 91 BPM | SYSTOLIC BLOOD PRESSURE: 124 MMHG | OXYGEN SATURATION: 95 % | DIASTOLIC BLOOD PRESSURE: 82 MMHG | WEIGHT: 253 LBS

## 2024-08-07 DIAGNOSIS — F40.10 SOCIAL ANXIETY DISORDER: Chronic | ICD-10-CM

## 2024-08-07 DIAGNOSIS — E78.00 HYPERCHOLESTEROLEMIA: ICD-10-CM

## 2024-08-07 DIAGNOSIS — I10 ESSENTIAL HYPERTENSION: ICD-10-CM

## 2024-08-07 DIAGNOSIS — Z13.1 DIABETES MELLITUS SCREENING: ICD-10-CM

## 2024-08-07 DIAGNOSIS — F31.78 BIPOLAR 1 DISORDER, MIXED, FULL REMISSION (HCC): Primary | ICD-10-CM

## 2024-08-07 DIAGNOSIS — G40.209 PARTIAL SYMPTOMATIC EPILEPSY WITH COMPLEX PARTIAL SEIZURES, NOT INTRACTABLE, WITHOUT STATUS EPILEPTICUS (HCC): ICD-10-CM

## 2024-08-07 PROCEDURE — 3074F SYST BP LT 130 MM HG: CPT | Performed by: NURSE PRACTITIONER

## 2024-08-07 PROCEDURE — 3079F DIAST BP 80-89 MM HG: CPT | Performed by: NURSE PRACTITIONER

## 2024-08-07 PROCEDURE — 99213 OFFICE O/P EST LOW 20 MIN: CPT | Performed by: NURSE PRACTITIONER

## 2024-08-07 RX ORDER — ZIPRASIDONE HYDROCHLORIDE 40 MG/1
CAPSULE ORAL
Qty: 90 CAPSULE | Refills: 3 | Status: SHIPPED | OUTPATIENT
Start: 2024-08-07

## 2024-08-07 RX ORDER — CLONAZEPAM 2 MG/1
2 TABLET ORAL 2 TIMES DAILY PRN
Qty: 30 TABLET | Refills: 0 | Status: SHIPPED | OUTPATIENT
Start: 2024-08-07 | End: 2024-09-06

## 2024-08-07 RX ORDER — AMLODIPINE BESYLATE 2.5 MG/1
TABLET ORAL
Qty: 90 TABLET | Refills: 3 | Status: SHIPPED | OUTPATIENT
Start: 2024-08-07

## 2024-08-07 RX ORDER — ZIPRASIDONE HYDROCHLORIDE 60 MG/1
CAPSULE ORAL
Qty: 180 CAPSULE | Refills: 3 | Status: SHIPPED | OUTPATIENT
Start: 2024-08-07

## 2024-08-07 RX ORDER — ZIPRASIDONE HYDROCHLORIDE 40 MG/1
CAPSULE ORAL
Qty: 90 CAPSULE | Refills: 0 | Status: SHIPPED | OUTPATIENT
Start: 2024-08-07

## 2024-08-07 RX ORDER — CLONAZEPAM 1 MG/1
1 TABLET ORAL 2 TIMES DAILY PRN
Qty: 60 TABLET | Refills: 2 | Status: SHIPPED | OUTPATIENT
Start: 2024-08-31 | End: 2024-09-30

## 2024-08-07 NOTE — PATIENT INSTRUCTIONS

## 2024-08-07 NOTE — PROGRESS NOTES
Mickey Nolasco (:  1980) is a 43 y.o. male,Established patient, here for evaluation of the following chief complaint(s):    Anxiety (ANXIETY ROUTINE FOLLOW UP )      SUBJECTIVE/OBJECTIVE:  HPI  Mickey is being seen for his anxiety  Doing well on current medications  Working at the [employer] - this is a bit stressful  He takes the klonopin as prescribed help when he feels overwhelmed - irritated   Being around people can make it worse  Needs refills on Geodon        Review of Systems   Psychiatric/Behavioral:  The patient is nervous/anxious.        Physical Exam  Vitals reviewed.   Cardiovascular:      Rate and Rhythm: Normal rate and regular rhythm.      Heart sounds: Normal heart sounds, S1 normal and S2 normal.   Pulmonary:      Effort: Pulmonary effort is normal.      Breath sounds: Normal breath sounds and air entry.   Neurological:      Mental Status: He is alert and oriented to person, place, and time.   Psychiatric:         Attention and Perception: Attention and perception normal.         Mood and Affect: Mood and affect normal.         Speech: Speech normal.         Behavior: Behavior normal.         Thought Content: Thought content normal.         Cognition and Memory: Cognition and memory normal.         Judgment: Judgment normal.         Mickey was seen today for anxiety.    Diagnoses and all orders for this visit:    Bipolar 1 disorder, mixed, full remission (HCC)  -     ziprasidone (GEODON) 40 MG capsule; TAKE 1 CAPSULE BY MOUTH DAILY AT DINNER WITH 60MG  -     ziprasidone (GEODON) 60 MG capsule; TAKE 1 CAP BY MOUTH 2 TIMES DAILY (WITH MEALS) TAKE A 60 MG WITH A 40 MG AT DINNER    Social anxiety disorder  Stable on current medication  Controlled substances monitoring: possible medication side effects, risk of tolerance and/or dependence, and alternative treatments discussed, no signs of potential drug abuse or diversion identified, and OARRS report reviewed today- activity consistent with

## 2024-10-04 NOTE — TELEPHONE ENCOUNTER
Pt  mother is calling to get a refill on his     Trihexyphenidyl  2 mg  pt takes 1 tablet twice a day   #180      Pt mother said she called the pharmacy and they told her that there is no refills and pt is not taking this as of  Feb 29 per our office.      [facility]  [place]      [phone]   Patient's belongings returned

## 2024-10-29 DIAGNOSIS — I10 ESSENTIAL HYPERTENSION: ICD-10-CM

## 2024-10-29 RX ORDER — NADOLOL 20 MG/1
TABLET ORAL
Qty: 45 TABLET | Refills: 0 | Status: SHIPPED | OUTPATIENT
Start: 2024-10-29

## 2024-11-02 ENCOUNTER — HOSPITAL ENCOUNTER (OUTPATIENT)
Age: 44
Discharge: HOME | End: 2024-11-02

## 2024-11-02 DIAGNOSIS — E78.00 HYPERCHOLESTEROLEMIA: ICD-10-CM

## 2024-11-02 DIAGNOSIS — Z13.1 DIABETES MELLITUS SCREENING: ICD-10-CM

## 2024-11-02 LAB
CHOLEST SERPL-MCNC: 179 MG/DL (ref 0–199)
GLUCOSE P FAST SERPL-MCNC: 111 MG/DL (ref 70–99)
HDLC SERPL-MCNC: 32 MG/DL (ref 40–60)
LDL CHOLESTEROL: 99 MG/DL
TRIGL SERPL-MCNC: 241 MG/DL (ref 0–150)
VLDLC SERPL CALC-MCNC: 48 MG/DL

## 2024-11-02 PROCEDURE — 36415 COLL VENOUS BLD VENIPUNCTURE: CPT

## 2024-11-02 PROCEDURE — 80061 LIPID PANEL: CPT

## 2024-11-02 PROCEDURE — 82947 ASSAY GLUCOSE BLOOD QUANT: CPT

## 2024-11-06 ENCOUNTER — OFFICE VISIT (OUTPATIENT)
Dept: FAMILY MEDICINE CLINIC | Age: 44
End: 2024-11-06
Payer: COMMERCIAL

## 2024-11-06 VITALS
WEIGHT: 252.2 LBS | SYSTOLIC BLOOD PRESSURE: 120 MMHG | DIASTOLIC BLOOD PRESSURE: 104 MMHG | BODY MASS INDEX: 38.22 KG/M2 | OXYGEN SATURATION: 96 % | HEIGHT: 68 IN | HEART RATE: 100 BPM

## 2024-11-06 DIAGNOSIS — G40.209 PARTIAL SYMPTOMATIC EPILEPSY WITH COMPLEX PARTIAL SEIZURES, NOT INTRACTABLE, WITHOUT STATUS EPILEPTICUS (HCC): ICD-10-CM

## 2024-11-06 DIAGNOSIS — F51.04 PSYCHOPHYSIOLOGICAL INSOMNIA: ICD-10-CM

## 2024-11-06 DIAGNOSIS — F31.78 BIPOLAR 1 DISORDER, MIXED, FULL REMISSION (HCC): ICD-10-CM

## 2024-11-06 DIAGNOSIS — R79.83 HOMOCYSTEINEMIA: ICD-10-CM

## 2024-11-06 DIAGNOSIS — F40.10 SOCIAL ANXIETY DISORDER: Chronic | ICD-10-CM

## 2024-11-06 DIAGNOSIS — I10 ESSENTIAL HYPERTENSION: ICD-10-CM

## 2024-11-06 PROCEDURE — 99214 OFFICE O/P EST MOD 30 MIN: CPT | Performed by: NURSE PRACTITIONER

## 2024-11-06 PROCEDURE — 3080F DIAST BP >= 90 MM HG: CPT | Performed by: NURSE PRACTITIONER

## 2024-11-06 PROCEDURE — 3074F SYST BP LT 130 MM HG: CPT | Performed by: NURSE PRACTITIONER

## 2024-11-06 RX ORDER — FENOFIBRATE 145 MG/1
145 TABLET, COATED ORAL DAILY
Qty: 90 TABLET | Refills: 3 | Status: SHIPPED | OUTPATIENT
Start: 2024-11-06 | End: 2025-02-04

## 2024-11-06 RX ORDER — LAMOTRIGINE 200 MG/1
TABLET ORAL
Qty: 180 TABLET | Refills: 1 | Status: SHIPPED | OUTPATIENT
Start: 2024-11-06

## 2024-11-06 RX ORDER — ZIPRASIDONE HYDROCHLORIDE 60 MG/1
CAPSULE ORAL
Qty: 180 CAPSULE | Refills: 3 | Status: SHIPPED | OUTPATIENT
Start: 2024-11-06

## 2024-11-06 RX ORDER — TRAZODONE HYDROCHLORIDE 50 MG/1
TABLET, FILM COATED ORAL
Qty: 90 TABLET | Refills: 3 | Status: SHIPPED | OUTPATIENT
Start: 2024-11-06

## 2024-11-06 RX ORDER — LANOLIN ALCOHOL/MO/W.PET/CERES
800 CREAM (GRAM) TOPICAL DAILY
Qty: 180 TABLET | Refills: 3 | Status: SHIPPED | OUTPATIENT
Start: 2024-11-06

## 2024-11-06 RX ORDER — CLONAZEPAM 2 MG/1
2 TABLET ORAL 2 TIMES DAILY PRN
Qty: 30 TABLET | Refills: 1 | Status: SHIPPED | OUTPATIENT
Start: 2024-11-06 | End: 2024-12-06

## 2024-11-06 RX ORDER — ZIPRASIDONE HYDROCHLORIDE 40 MG/1
CAPSULE ORAL
Qty: 90 CAPSULE | Refills: 3 | Status: SHIPPED | OUTPATIENT
Start: 2024-11-06

## 2024-11-06 RX ORDER — CLONAZEPAM 1 MG/1
1 TABLET ORAL 2 TIMES DAILY PRN
Qty: 60 TABLET | Refills: 2 | Status: SHIPPED | OUTPATIENT
Start: 2024-11-28 | End: 2024-12-28

## 2024-11-06 RX ORDER — NADOLOL 20 MG/1
TABLET ORAL
Qty: 45 TABLET | Refills: 1 | Status: SHIPPED | OUTPATIENT
Start: 2024-11-06

## 2024-11-06 NOTE — PROGRESS NOTES
Mickey Nolasco (:  1980) is a 44 y.o. 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   Constitutional:       General: He is awake. He is not in acute distress.     Appearance: Normal appearance. He is well-developed and well-groomed. He is obese. He is not ill-appearing, toxic-appearing or diaphoretic.   Cardiovascular:      Rate and Rhythm: Normal rate and regular rhythm.      Heart sounds: Normal heart sounds, S1 normal and S2 normal.   Pulmonary:      Effort: Pulmonary effort is normal.      Breath sounds: Normal breath sounds and air entry.   Neurological:      Mental Status: He is alert and oriented to person, place, and time.   Psychiatric:         Attention and Perception: Attention and perception normal.         Mood and Affect: Mood and affect normal.         Speech: Speech normal.         Behavior: Behavior normal. Behavior is cooperative.         Thought Content: Thought content normal.         Cognition and Memory: Cognition and memory normal.         Judgment: Judgment normal.

## 2024-12-10 DIAGNOSIS — I10 ESSENTIAL HYPERTENSION: ICD-10-CM

## 2024-12-10 RX ORDER — AMLODIPINE BESYLATE 2.5 MG/1
TABLET ORAL
Qty: 90 TABLET | Refills: 3 | Status: SHIPPED | OUTPATIENT
Start: 2024-12-10

## 2024-12-19 DIAGNOSIS — I10 ESSENTIAL HYPERTENSION: ICD-10-CM

## 2024-12-20 RX ORDER — AMLODIPINE BESYLATE 2.5 MG/1
TABLET ORAL
Qty: 180 TABLET | Refills: 0 | Status: SHIPPED | OUTPATIENT
Start: 2024-12-20

## 2024-12-20 NOTE — TELEPHONE ENCOUNTER
LV 11/6/24 WITH CC NV 2/12/25  Essential hypertension  Not well controlled  Norvasc increased to 5 mg    PER CC NOTE ON 11/7/24

## 2025-01-31 DIAGNOSIS — G40.209 PARTIAL SYMPTOMATIC EPILEPSY WITH COMPLEX PARTIAL SEIZURES, NOT INTRACTABLE, WITHOUT STATUS EPILEPTICUS (HCC): ICD-10-CM

## 2025-01-31 RX ORDER — GABAPENTIN 400 MG/1
CAPSULE ORAL
Qty: 120 CAPSULE | Refills: 5 | Status: SHIPPED | OUTPATIENT
Start: 2025-01-31 | End: 2025-06-30

## 2025-02-12 ENCOUNTER — OFFICE VISIT (OUTPATIENT)
Dept: FAMILY MEDICINE CLINIC | Age: 45
End: 2025-02-12

## 2025-02-12 VITALS
OXYGEN SATURATION: 97 % | WEIGHT: 250 LBS | HEART RATE: 77 BPM | BODY MASS INDEX: 37.89 KG/M2 | DIASTOLIC BLOOD PRESSURE: 80 MMHG | HEIGHT: 68 IN | SYSTOLIC BLOOD PRESSURE: 124 MMHG

## 2025-02-12 DIAGNOSIS — T50.905A EXTRAPYRAMIDAL MOVEMENT DISORDER, DRUG-INDUCED: ICD-10-CM

## 2025-02-12 DIAGNOSIS — F33.42 RECURRENT MAJOR DEPRESSIVE DISORDER, IN FULL REMISSION (HCC): Chronic | ICD-10-CM

## 2025-02-12 DIAGNOSIS — I10 ESSENTIAL HYPERTENSION: ICD-10-CM

## 2025-02-12 DIAGNOSIS — G40.209 PARTIAL SYMPTOMATIC EPILEPSY WITH COMPLEX PARTIAL SEIZURES, NOT INTRACTABLE, WITHOUT STATUS EPILEPTICUS (HCC): ICD-10-CM

## 2025-02-12 DIAGNOSIS — G25.89 EXTRAPYRAMIDAL MOVEMENT DISORDER, DRUG-INDUCED: ICD-10-CM

## 2025-02-12 DIAGNOSIS — F31.78 BIPOLAR 1 DISORDER, MIXED, FULL REMISSION (HCC): Primary | ICD-10-CM

## 2025-02-12 DIAGNOSIS — F40.10 SOCIAL ANXIETY DISORDER: Chronic | ICD-10-CM

## 2025-02-12 RX ORDER — TRIHEXYPHENIDYL HYDROCHLORIDE 2 MG/1
TABLET ORAL
Qty: 180 TABLET | Refills: 3 | Status: SHIPPED | OUTPATIENT
Start: 2025-02-12

## 2025-02-12 RX ORDER — CLONAZEPAM 2 MG/1
2 TABLET ORAL 2 TIMES DAILY PRN
Qty: 30 TABLET | Refills: 1 | Status: SHIPPED | OUTPATIENT
Start: 2025-03-02 | End: 2025-04-01

## 2025-02-12 RX ORDER — AMLODIPINE BESYLATE 2.5 MG/1
TABLET ORAL
Qty: 180 TABLET | Refills: 0 | Status: CANCELLED | OUTPATIENT
Start: 2025-02-12

## 2025-02-12 RX ORDER — AMLODIPINE BESYLATE 5 MG/1
5 TABLET ORAL DAILY
Qty: 90 TABLET | Refills: 1 | Status: SHIPPED | OUTPATIENT
Start: 2025-02-12 | End: 2025-05-13

## 2025-02-12 RX ORDER — CLONAZEPAM 1 MG/1
1 TABLET ORAL 2 TIMES DAILY PRN
Qty: 60 TABLET | Refills: 2 | Status: SHIPPED | OUTPATIENT
Start: 2025-02-25 | End: 2025-03-27

## 2025-02-12 SDOH — ECONOMIC STABILITY: FOOD INSECURITY: WITHIN THE PAST 12 MONTHS, YOU WORRIED THAT YOUR FOOD WOULD RUN OUT BEFORE YOU GOT MONEY TO BUY MORE.: NEVER TRUE

## 2025-02-12 SDOH — ECONOMIC STABILITY: FOOD INSECURITY: WITHIN THE PAST 12 MONTHS, THE FOOD YOU BOUGHT JUST DIDN'T LAST AND YOU DIDN'T HAVE MONEY TO GET MORE.: NEVER TRUE

## 2025-02-12 ASSESSMENT — PATIENT HEALTH QUESTIONNAIRE - PHQ9
SUM OF ALL RESPONSES TO PHQ QUESTIONS 1-9: 2
7. TROUBLE CONCENTRATING ON THINGS, SUCH AS READING THE NEWSPAPER OR WATCHING TELEVISION: NOT AT ALL
SUM OF ALL RESPONSES TO PHQ QUESTIONS 1-9: 2
2. FEELING DOWN, DEPRESSED OR HOPELESS: SEVERAL DAYS
3. TROUBLE FALLING OR STAYING ASLEEP: NOT AT ALL
SUM OF ALL RESPONSES TO PHQ9 QUESTIONS 1 & 2: 1
1. LITTLE INTEREST OR PLEASURE IN DOING THINGS: NOT AT ALL
DEPRESSION UNABLE TO ASSESS: URGENT/EMERGENT SITUATION
10. IF YOU CHECKED OFF ANY PROBLEMS, HOW DIFFICULT HAVE THESE PROBLEMS MADE IT FOR YOU TO DO YOUR WORK, TAKE CARE OF THINGS AT HOME, OR GET ALONG WITH OTHER PEOPLE: NOT DIFFICULT AT ALL
SUM OF ALL RESPONSES TO PHQ QUESTIONS 1-9: 2
8. MOVING OR SPEAKING SO SLOWLY THAT OTHER PEOPLE COULD HAVE NOTICED. OR THE OPPOSITE, BEING SO FIGETY OR RESTLESS THAT YOU HAVE BEEN MOVING AROUND A LOT MORE THAN USUAL: NOT AT ALL
SUM OF ALL RESPONSES TO PHQ QUESTIONS 1-9: 2
4. FEELING TIRED OR HAVING LITTLE ENERGY: NOT AT ALL
9. THOUGHTS THAT YOU WOULD BE BETTER OFF DEAD, OR OF HURTING YOURSELF: NOT AT ALL
6. FEELING BAD ABOUT YOURSELF - OR THAT YOU ARE A FAILURE OR HAVE LET YOURSELF OR YOUR FAMILY DOWN: SEVERAL DAYS
5. POOR APPETITE OR OVEREATING: NOT AT ALL

## 2025-02-12 NOTE — PROGRESS NOTES
cooperative.         Thought Content: Thought content normal.         Cognition and Memory: Cognition and memory normal.         Judgment: Judgment normal.         Mickey was seen today for medication refill and anxiety.    Diagnoses and all orders for this visit:    Bipolar 1 disorder, mixed, full remission (HCC)  Social anxiety disorder  Recurrent major depressive disorder, in full remission (HCC)  STABLE  CONTINUE CURRENT PSYCH MEDICATIONS  Controlled substances monitoring: possible medication side effects, risk of tolerance and/or dependence, and alternative treatments discussed, no signs of potential drug abuse or diversion identified, and OARRS report reviewed today- activity consistent with treatment plan.   -     clonazePAM (KLONOPIN) 1 MG tablet; Take 1 tablet by mouth 2 times daily as needed for Anxiety for up to 30 days. Fill 2/25/25 or later Max Daily Amount: 2 mg  FOLLOW UP IN THREE MONTHS WITH PCP  FOLLOW UP WITH THERAPIST AS INSTRUCTED  R  Partial symptomatic epilepsy with complex partial seizures, not intractable, without status epilepticus (HCC)  WELL CONTROLLED  Controlled substances monitoring: possible medication side effects, risk of tolerance and/or dependence, and alternative treatments discussed, no signs of potential drug abuse or diversion identified, and OARRS report reviewed today- activity consistent with treatment plan.   -     clonazePAM (KLONOPIN) 2 MG tablet; Take 1 tablet by mouth 2 times daily as needed (SEIZURES) for up to 30 days. Fill 3/2/25 TAKE TO ABORT SEIZURES Max Daily Amount: 4 mg  FOLLOW UP IN THREE MONTHS    Extrapyramidal movement disorder, drug-induced  STABLE  -     trihexyphenidyl (ARTANE) 2 MG tablet; TAKE ONE (1) TABLET BY MOUTH TWO (2) TIMES DAILY    Essential hypertension  STABLE ON CURRENT MEDICATION   CONTINUE AS PRESCRIBED NORVASC- CORGARD  -     amLODIPine (NORVASC) 5 MG tablet; Take 1 tablet by mouth daily  ENCOURAGED TO MONITOR B/P GOAL 140/90 OR

## 2025-02-14 NOTE — ADDENDUM NOTE
Addended by: FIORDALIZA DE LA ROSA on: 2/14/2025 02:18 AM     Modules accepted: Level of Service

## 2025-03-03 DIAGNOSIS — K21.9 GASTROESOPHAGEAL REFLUX DISEASE, UNSPECIFIED WHETHER ESOPHAGITIS PRESENT: Chronic | ICD-10-CM

## 2025-03-03 RX ORDER — OMEPRAZOLE 40 MG/1
40 CAPSULE, DELAYED RELEASE ORAL
Qty: 90 CAPSULE | Refills: 1 | Status: SHIPPED | OUTPATIENT
Start: 2025-03-03

## 2025-03-17 ENCOUNTER — TELEPHONE (OUTPATIENT)
Dept: ORTHOPEDIC SURGERY | Age: 45
End: 2025-03-17

## 2025-03-17 NOTE — TELEPHONE ENCOUNTER
Attempted to contact Pt. I called the number on the chart and Pt's mother, Meenakshi answered. She is listed on the most recent Pt communication as a contact I am able to speak with. A new appt has been scheduled with Dr. Saravia. Pt's mother was informed of time, date and location. Pt expressed understanding

## 2025-03-24 ENCOUNTER — OFFICE VISIT (OUTPATIENT)
Dept: ORTHOPEDIC SURGERY | Age: 45
End: 2025-03-24

## 2025-03-24 VITALS — BODY MASS INDEX: 37.89 KG/M2 | HEIGHT: 68 IN | WEIGHT: 250 LBS

## 2025-03-24 DIAGNOSIS — M79.645 PAIN OF LEFT THUMB: ICD-10-CM

## 2025-03-24 DIAGNOSIS — M25.531 PAIN IN BOTH WRISTS: Primary | ICD-10-CM

## 2025-03-24 DIAGNOSIS — M25.532 PAIN IN BOTH WRISTS: Primary | ICD-10-CM

## 2025-03-24 DIAGNOSIS — M25.531 RIGHT WRIST PAIN: ICD-10-CM

## 2025-03-24 RX ORDER — METHYLPREDNISOLONE 4 MG/1
TABLET ORAL
Qty: 1 KIT | Refills: 0 | Status: SHIPPED | OUTPATIENT
Start: 2025-03-24 | End: 2025-03-30

## 2025-03-24 NOTE — PROGRESS NOTES
joint negative grind test, no MCP joint hyperextension        Results  Three-view x-rays of the right wrist dated 3/24/2025 were independently reviewed and discussed with the patient.  The films revealed prior ulnar styloid nonunion but no other bony abnormalities     Three-view x-rays of the left wrist dated 3/24/2025 were independently reviewed and discussed the patient.  The films reveal early CMC osteoarthritis with mild joint space narrowing and bone spurs.          Assessment & Plan  1. Right wrist ECU tendinitis: Will provide wrist brace to wear for symptomatic relief and steroid Dosepak.  Patient cannot take NSAIDs due to ulcer risk.    2. Left thumb CMC arthritis: CMC brace provided.  Steroids will help with this pain as well.  If no improvement, can consider steroid injection at next visit.    Follow-up in 2 months.          Procedures    Elvia Adkins CMC Controller Plus     Patient was prescribed a Elvia Adkins CMC Controller Plus Thumb Splint.    The left thumb will require stabilization / immobilization from this semi-rigid / rigid orthosis to improve their function.  The orthosis will assist in protecting the affected area, provide functional support and facilitate healing.    The patient was educated and fit by a healthcare professional with expert knowledge and specialization in brace application while under the direct supervision of the physician.  Verbal and written instructions for the use of and application of this item were provided.   They were instructed to contact the office immediately should the brace result in increased pain, decreased sensation, increased swelling or worsening of the condition.    Levia Adkins Titan Wrist Long Forearm Brace     Patient was prescribed a Elvia Adkins Titan Wrist and Forearm Brace.   The right wrist will require stabilization / immobilization from this semi-rigid / rigid orthosis to improve their function.  The orthosis will assist in protecting the affected area,

## 2025-05-14 ENCOUNTER — OFFICE VISIT (OUTPATIENT)
Dept: FAMILY MEDICINE CLINIC | Age: 45
End: 2025-05-14
Payer: COMMERCIAL

## 2025-05-14 VITALS
HEART RATE: 68 BPM | OXYGEN SATURATION: 95 % | DIASTOLIC BLOOD PRESSURE: 76 MMHG | SYSTOLIC BLOOD PRESSURE: 104 MMHG | HEIGHT: 68 IN | WEIGHT: 239.6 LBS | BODY MASS INDEX: 36.31 KG/M2

## 2025-05-14 DIAGNOSIS — J45.990 EXERCISE INDUCED BRONCHOSPASM: Primary | ICD-10-CM

## 2025-05-14 DIAGNOSIS — F31.78 BIPOLAR 1 DISORDER, MIXED, FULL REMISSION: ICD-10-CM

## 2025-05-14 DIAGNOSIS — G40.209 PARTIAL SYMPTOMATIC EPILEPSY WITH COMPLEX PARTIAL SEIZURES, NOT INTRACTABLE, WITHOUT STATUS EPILEPTICUS (HCC): ICD-10-CM

## 2025-05-14 DIAGNOSIS — F40.10 SOCIAL ANXIETY DISORDER: Chronic | ICD-10-CM

## 2025-05-14 DIAGNOSIS — I10 ESSENTIAL HYPERTENSION: ICD-10-CM

## 2025-05-14 LAB
ALCOHOL URINE: NORMAL
AMPHETAMINE SCREEN URINE: NORMAL
BARBITURATE SCREEN URINE: NORMAL
BENZODIAZEPINE SCREEN, URINE: POSITIVE
BUPRENORPHINE URINE: NORMAL
COCAINE METABOLITE SCREEN URINE: NORMAL
FENTANYL SCREEN, URINE: NORMAL
GABAPENTIN SCREEN, URINE: NORMAL
MDMA, URINE: NORMAL
METHADONE SCREEN, URINE: NORMAL
METHAMPHETAMINE, URINE: NORMAL
OPIATE SCREEN URINE: NORMAL
OXYCODONE SCREEN URINE: NORMAL
PHENCYCLIDINE SCREEN URINE: NORMAL
PROPOXYPHENE SCREEN, URINE: NORMAL
SYNTHETIC CANNABINOIDS(K2) SCREEN, URINE: NORMAL
THC SCREEN, URINE: POSITIVE
TRAMADOL SCREEN URINE: NORMAL
TRICYCLIC ANTIDEPRESSANTS, UR: NORMAL

## 2025-05-14 PROCEDURE — 3074F SYST BP LT 130 MM HG: CPT | Performed by: NURSE PRACTITIONER

## 2025-05-14 PROCEDURE — 80305 DRUG TEST PRSMV DIR OPT OBS: CPT | Performed by: NURSE PRACTITIONER

## 2025-05-14 PROCEDURE — 3078F DIAST BP <80 MM HG: CPT | Performed by: NURSE PRACTITIONER

## 2025-05-14 PROCEDURE — 99214 OFFICE O/P EST MOD 30 MIN: CPT | Performed by: NURSE PRACTITIONER

## 2025-05-14 RX ORDER — CLONAZEPAM 1 MG/1
1 TABLET ORAL 2 TIMES DAILY PRN
Qty: 60 TABLET | Refills: 2 | Status: SHIPPED | OUTPATIENT
Start: 2025-05-30 | End: 2025-06-29

## 2025-05-14 RX ORDER — LAMOTRIGINE 200 MG/1
TABLET ORAL
Qty: 180 TABLET | Refills: 1 | Status: SHIPPED | OUTPATIENT
Start: 2025-05-14

## 2025-05-14 RX ORDER — ATORVASTATIN CALCIUM 10 MG/1
10 TABLET, FILM COATED ORAL DAILY
Qty: 90 TABLET | Refills: 3 | Status: SHIPPED | OUTPATIENT
Start: 2025-05-14 | End: 2025-08-12

## 2025-05-14 RX ORDER — ALBUTEROL SULFATE 90 UG/1
2 INHALANT RESPIRATORY (INHALATION) 4 TIMES DAILY PRN
Qty: 18 G | Refills: 1 | Status: SHIPPED | OUTPATIENT
Start: 2025-05-14 | End: 2025-06-13

## 2025-05-14 RX ORDER — CLONAZEPAM 2 MG/1
2 TABLET ORAL 2 TIMES DAILY PRN
Qty: 30 TABLET | Refills: 0 | Status: SHIPPED | OUTPATIENT
Start: 2025-05-14 | End: 2025-06-13

## 2025-05-14 RX ORDER — NADOLOL 20 MG/1
TABLET ORAL
Qty: 45 TABLET | Refills: 3 | Status: SHIPPED | OUTPATIENT
Start: 2025-05-14

## 2025-05-14 NOTE — PROGRESS NOTES
Mickey Nolasco (:  1980) is a 44 y.o. 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   Constitutional:       General: He is awake. He is not in acute distress.     Appearance: Normal appearance. He is well-developed and well-groomed. He is obese. He is not ill-appearing, toxic-appearing or diaphoretic.   Cardiovascular:      Rate and Rhythm: Normal rate and regular rhythm.      Heart sounds: Normal heart sounds, S1 normal and S2 normal.   Pulmonary:      Effort: Pulmonary effort is normal.      Breath sounds: Normal breath sounds and air entry.   Neurological:      Mental Status: He is alert and oriented to person, place, and time.   Psychiatric:         Attention and Perception: Attention and perception normal.         Mood and Affect: Mood and affect normal.         Speech: Speech normal.         Behavior: Behavior normal. Behavior is cooperative.         Thought Content: Thought content normal.         Cognition and Memory: Cognition and memory normal.         Judgment: Judgment normal.           Mickey was seen today for anxiety and flu

## 2025-06-19 DIAGNOSIS — F31.78 BIPOLAR 1 DISORDER, MIXED, FULL REMISSION: ICD-10-CM

## 2025-06-20 RX ORDER — AMLODIPINE BESYLATE 5 MG/1
5 TABLET ORAL DAILY
Qty: 90 TABLET | Refills: 1 | Status: SHIPPED | OUTPATIENT
Start: 2025-06-20

## 2025-06-20 RX ORDER — LAMOTRIGINE 200 MG/1
TABLET ORAL
Qty: 180 TABLET | Refills: 1 | Status: SHIPPED | OUTPATIENT
Start: 2025-06-20

## 2025-07-20 DIAGNOSIS — G40.209 PARTIAL SYMPTOMATIC EPILEPSY WITH COMPLEX PARTIAL SEIZURES, NOT INTRACTABLE, WITHOUT STATUS EPILEPTICUS (HCC): ICD-10-CM

## 2025-07-21 RX ORDER — GABAPENTIN 400 MG/1
CAPSULE ORAL
Qty: 120 CAPSULE | Refills: 5 | Status: SHIPPED | OUTPATIENT
Start: 2025-07-21 | End: 2025-08-20

## 2025-08-18 ENCOUNTER — OFFICE VISIT (OUTPATIENT)
Dept: FAMILY MEDICINE CLINIC | Age: 45
End: 2025-08-18
Payer: COMMERCIAL

## 2025-08-18 VITALS
BODY MASS INDEX: 36.56 KG/M2 | DIASTOLIC BLOOD PRESSURE: 86 MMHG | HEART RATE: 70 BPM | HEIGHT: 68 IN | SYSTOLIC BLOOD PRESSURE: 110 MMHG | WEIGHT: 241.2 LBS | OXYGEN SATURATION: 97 %

## 2025-08-18 DIAGNOSIS — G40.209 PARTIAL SYMPTOMATIC EPILEPSY WITH COMPLEX PARTIAL SEIZURES, NOT INTRACTABLE, WITHOUT STATUS EPILEPTICUS (HCC): ICD-10-CM

## 2025-08-18 DIAGNOSIS — K21.9 GASTROESOPHAGEAL REFLUX DISEASE, UNSPECIFIED WHETHER ESOPHAGITIS PRESENT: Chronic | ICD-10-CM

## 2025-08-18 DIAGNOSIS — E66.812 CLASS 2 OBESITY WITHOUT SERIOUS COMORBIDITY WITH BODY MASS INDEX (BMI) OF 36.0 TO 36.9 IN ADULT, UNSPECIFIED OBESITY TYPE: Primary | ICD-10-CM

## 2025-08-18 DIAGNOSIS — F40.10 SOCIAL ANXIETY DISORDER: Chronic | ICD-10-CM

## 2025-08-18 DIAGNOSIS — F51.04 PSYCHOPHYSIOLOGICAL INSOMNIA: Chronic | ICD-10-CM

## 2025-08-18 PROCEDURE — 3074F SYST BP LT 130 MM HG: CPT | Performed by: NURSE PRACTITIONER

## 2025-08-18 PROCEDURE — 3079F DIAST BP 80-89 MM HG: CPT | Performed by: NURSE PRACTITIONER

## 2025-08-18 PROCEDURE — 99214 OFFICE O/P EST MOD 30 MIN: CPT | Performed by: NURSE PRACTITIONER

## 2025-08-18 RX ORDER — OMEPRAZOLE 40 MG/1
40 CAPSULE, DELAYED RELEASE ORAL
Qty: 90 CAPSULE | Refills: 3 | Status: SHIPPED | OUTPATIENT
Start: 2025-08-18

## 2025-08-18 RX ORDER — TRAZODONE HYDROCHLORIDE 100 MG/1
100 TABLET ORAL NIGHTLY
Qty: 90 TABLET | Refills: 1 | Status: SHIPPED | OUTPATIENT
Start: 2025-08-18

## 2025-08-18 RX ORDER — PHENTERMINE HYDROCHLORIDE 37.5 MG/1
37.5 TABLET ORAL
Qty: 90 TABLET | Refills: 0 | Status: SHIPPED | OUTPATIENT
Start: 2025-08-18 | End: 2025-11-16

## 2025-08-18 RX ORDER — CLONAZEPAM 2 MG/1
2 TABLET ORAL 2 TIMES DAILY PRN
Qty: 30 TABLET | Refills: 0 | Status: SHIPPED | OUTPATIENT
Start: 2025-08-18 | End: 2025-09-17

## 2025-08-18 RX ORDER — CLONAZEPAM 1 MG/1
1 TABLET ORAL 2 TIMES DAILY PRN
Qty: 60 TABLET | Refills: 2 | Status: SHIPPED | OUTPATIENT
Start: 2025-08-27 | End: 2025-09-26

## (undated) DEVICE — GLOVE SURG SZ 65 L12IN FNGR THK79MIL GRN LTX FREE

## (undated) DEVICE — TOWEL,OR,DSP,ST,BLUE,STD,4/PK,20PK/CS: Brand: MEDLINE

## (undated) DEVICE — BANDAGE COMPR W4INXL12FT E DISP ESMARCH EVEN

## (undated) DEVICE — GAUZE,SPONGE,4"X4",8PLY,STRL,LF,10/TRAY: Brand: MEDLINE

## (undated) DEVICE — SYSTEM SKIN CLSR 22CM DERMBND PRINEO

## (undated) DEVICE — GLOVE SURG SZ 6 THK91MIL LTX FREE SYN POLYISOPRENE ANTI

## (undated) DEVICE — DRESSING WND ISLAND 4X8 IN ANTIMICROBIAL BARR THERABOND 3D

## (undated) DEVICE — SUTURE VCRL + SZ 3-0 L18IN ABSRB UD SH 1/2 CIR TAPERCUT NDL VCP864D

## (undated) DEVICE — SUTURE MCRYL + SZ 4-0 L27IN ABSRB UD L19MM PS-2 3/8 CIR MCP426H

## (undated) DEVICE — SYRINGE IRRIG 60ML SFT PLIABLE BLB EZ TO GRP 1 HND USE W/

## (undated) DEVICE — PACK PROCEDURE SURG EXTREMITY MFFOP CUST

## (undated) DEVICE — Device

## (undated) DEVICE — INTENDED FOR TISSUE SEPARATION, AND OTHER PROCEDURES THAT REQUIRE A SHARP SURGICAL BLADE TO PUNCTURE OR CUT.: Brand: BARD-PARKER ® STAINLESS STEEL BLADES

## (undated) DEVICE — MARKER,SKIN,WI/RULER AND LABELS: Brand: MEDLINE

## (undated) DEVICE — SUTURE VCRL + SZ 0 L18IN ABSRB UD L36MM CT-1 1/2 CIR VCP840D

## (undated) DEVICE — GLOVE ORTHO 7   MSG9470

## (undated) DEVICE — HYPODERMIC SAFETY NEEDLE: Brand: MAGELLAN

## (undated) DEVICE — DRAPE,U/ SHT,SPLIT,PLAS,STERIL: Brand: MEDLINE

## (undated) DEVICE — SOLUTION IRRIG 500ML 0.9% SOD CHLO USP POUR PLAS BTL

## (undated) DEVICE — GLOVE SURG SZ 7 L12IN FNGR THK79MIL GRN LTX FREE

## (undated) DEVICE — ZIMMER® STERILE DISPOSABLE TOURNIQUET CUFF WITH PLC, DUAL PORT, SINGLE BLADDER, 18 IN. (46 CM)

## (undated) DEVICE — OPTIFOAM GENTLE,NON-BORDERED,4X4: Brand: MEDLINE

## (undated) DEVICE — APPLICATOR MEDICATED 26 CC SOLUTION HI LT ORNG CHLORAPREP

## (undated) DEVICE — DRESSING FOAM W3XL3IN GENTLE SIL FACE BORD ADH PD SUP ABSRB

## (undated) DEVICE — DRAPE HND W114XL142IN BLU POLYPR W O PCH FEN CRD AND TB HLDR

## (undated) DEVICE — CORD,CAUTERY,BIPOLAR,STERILE: Brand: MEDLINE

## (undated) DEVICE — PADDING CAST W4INXL4YD ST COT RAYON MICROPLEATED HIGHLY